# Patient Record
Sex: MALE | Race: BLACK OR AFRICAN AMERICAN
[De-identification: names, ages, dates, MRNs, and addresses within clinical notes are randomized per-mention and may not be internally consistent; named-entity substitution may affect disease eponyms.]

---

## 2019-07-30 ENCOUNTER — HOSPITAL ENCOUNTER (INPATIENT)
Dept: HOSPITAL 74 - YASAS | Age: 54
LOS: 4 days | Discharge: OTHER INSTITUTION | DRG: 773 | End: 2019-08-03
Attending: SURGERY | Admitting: SURGERY
Payer: COMMERCIAL

## 2019-07-30 VITALS — BODY MASS INDEX: 22.8 KG/M2

## 2019-07-30 DIAGNOSIS — F11.23: Primary | ICD-10-CM

## 2019-07-30 DIAGNOSIS — F12.20: ICD-10-CM

## 2019-07-30 DIAGNOSIS — F10.230: ICD-10-CM

## 2019-07-30 DIAGNOSIS — R74.8: ICD-10-CM

## 2019-07-30 DIAGNOSIS — J45.20: ICD-10-CM

## 2019-07-30 DIAGNOSIS — D72.819: ICD-10-CM

## 2019-07-30 DIAGNOSIS — F14.20: ICD-10-CM

## 2019-07-30 DIAGNOSIS — Z21: ICD-10-CM

## 2019-07-30 DIAGNOSIS — F17.210: ICD-10-CM

## 2019-07-30 PROCEDURE — HZ2ZZZZ DETOXIFICATION SERVICES FOR SUBSTANCE ABUSE TREATMENT: ICD-10-PCS | Performed by: ALLERGY & IMMUNOLOGY

## 2019-07-30 RX ADMIN — Medication SCH MG: at 22:12

## 2019-07-30 NOTE — HP
COWS





- Scale


Resting Pulse: 1= MO 


Sweatin=Flushed/Facial Moisture


Restless Observation: 0= Sits Still


Pupil Size: 1= Pupils >than Normal


Bone or Joint Aches: 4=Acute Joint/Muscle Pain


Runny Nose/ Eye Tearin= Runny Nose/Eyes


GI Upset > 30mins: 1= Stomach Cramp


Tremor Observation: 2= Slight Tremor Visible


Yawning Observation: 0= None


Anxiety or Irritability: 2=Irritable/Anxious


Goose Flesh Skin: 0=Smooth Skin


COWS Score: 15





CIWA Score


Nausea/Vomitin


Muscle Tremors: 3


Anxiety: 3


Agitation: 3


Paroxysmal Sweats: 3


Orientation: 0-Oriented


Tacttile Disturbances: 0-None


Auditory Disturbances: 0-None


Visual Disturbances: 0-None


Headache: 0-None Present


CIWA-Ar Total Score: 14





- Admission Criteria


[facility] Guidelines: Admission for Medically Managed Detox: 


Requires at least one of the followin. CIWA greater than 12


2. Seizures within the past 24 hours


3. Delirium tremens within the past 24 hours


4. Hallucinations within the past 24 hours


5. Acute intervention needed for co  occurring medical disorder


6. Acute intervention needed for co  occurring psychiatric disorder


7. Severe withdrawal that cannot be handled at a lower level of care (continued


    vomiting, continued diarrhea, abnormal vital signs) requiring intravenous


    medication and/or fluids


8. Pregnancy








Admission ROS [facility]


Chief Complaint: 





Alcohol and heroin withdrawal symptoms


Allergies/Adverse Reactions: 


 Allergies











Allergy/AdvReac Type Severity Reaction Status Date / Time


 


No Known Allergies Allergy   Verified 19 17:47











History of Present Illness: 





54 years old male with a long history of alcohol dependence  and reportedly 

first time of heroin use is seeking admission to detox. Patient has been in 

previous detox, last at [facility] and reports 3 years of 

sobriety. He reports medical history of asthma and HIV+. He denies suicide 

attempt/ suicidal ideation at this time





- Ebola screening


Have you traveled outside of the country in the last 21 days: No


Have you had contact with anyone from an Ebola affected area: No





- Review of Systems


Constitutional: Chills, Loss of Appetite, Malaise, Changes in sleep


EENT: reports: Sinus Pressure


Respiratory: reports: No Symptoms reported


Cardiac: reports: No Symptoms Reported


GI: reports: Nausea, Poor Appetite, Poor Fluid Intake, Abdominal cramping


: reports: No Symptoms Reported


Musculoskeletal: reports: No Symptoms Reported


Integumentary: reports: No Symptoms Reported


Neuro: reports: Tremors


Endocrine: reports: No Symptoms Reported


Hematology: reports: No Symptoms Reported


Psychiatric: reports: Agitated, Anxious


Other Systems: Reviewed and Negative





Patient History





- Patient Medical History


Hx Anemia: No


Hx Asthma: Yes (Albuterol)


Hx Chronic Obstructive Pulmonary Disease (COPD): No


Hx Cancer: No


Hx Cardiac Disorders: No


Hx Congestive Heart Failure: No


Hx Hypertension: No


Hx Hypercholesterolemia: No


Hx Pacemaker: No


HX Cerebrovascular Accident: No


Hx Seizures: No


Hx Dementia: No


Hx Diabetes: No


Hx Gastrointestinal Disorders: No


Hx Liver Disease: No


Hx Genitourinary Disorders: No


Hx Sexually Transmitted Disorders: No


Hx Renal Disease (ESRD): No


Hx Thyroid Disease: No


Hx Human Immunodeficiency Virus (HIV): Yes (Efavirenz)


Hx Hepatitis C: No


Hx Depression: No


Hx Suicide Attempt: No (Denies suicidal ideation at this time)


Hx Bipolar Disorder: No


Hx Schizophrenia: No





- Patient Surgical History


Past Surgical History: Yes


Other Surgical History: Removal of cyst from anus 





- PPD History


Previous Implant?: Yes


Documented Results: Negative w/o proof


Implanted On Prior [facility] Admission?: No


PPD to be Administered?: Yes





- Reproductive History


Patient is a Female of Child Bearing Age (11 -55 yrs old): No (male)





- Smoking Cessation


Smoking history: Current every day smoker


Have you smoked in the past 12 months: Yes


Aproximately how many cigarettes per day: 20


Hx Chewing Tobacco Use: No


Initiated information on smoking cessation: Yes


'Breaking Loose' booklet given: 19





- Substance & Tx. History


Hx Alcohol Use: Yes


Hx Substance Use: Yes


Substance Use Type: Alcohol, Cocaine, Heroin, Marijuana, Opiates


Hx Substance Use Treatment: Yes ([facility])





- Substances abused


  ** Alcohol


Substance route: Oral


Frequency: Daily


Amount used: 16 oz of beer 2 packs and 3 pints of vodka


Age of first use: 16


Date of last use: 19





  ** Heroin


Substance route: Inhalation


Frequency: 1-3 times last 30 days


Amount used: 10 bags


Age of first use: 16


Date of last use: 19





  ** Crack


Substance route: Smoking


Frequency: 1-3 times last 30 days


Amount used: 5 grams of crack


Age of first use: 16


Date of last use: 19





  ** Marijuana/Hashish


Substance route: Smoking


Frequency: Daily


Amount used: 1 ounce


Age of first use: 16


Date of last use: 19





Family Disease History





- Family Disease History


Family History: Denies





Admission Physical Exam [facility]





- Vital Signs


Vital Signs: 


 Vital Signs - 24 hr











  19





  17:43


 


Temperature 98.6 F


 


Pulse Rate 84


 


Respiratory 16





Rate 


 


Blood Pressure 106/71














- Physical


General Appearance: Yes: Moderate Distress, Thin, Tremorous, Irritable, Anxious


HEENTM: Yes: Within Normal Limits


Respiratory: Yes: Lungs Clear, Normal Breath Sounds, No Respiratory Distress


Neck: Yes: Supple


Breast: Yes: Breast Exam Deferred


Cardiology: Yes: Within Normal Limits


Abdominal: Yes: Normal Bowel Sounds, Soft


Genitourinary: Yes: Within Normal Limits


Back: Yes: Normal Inspection


Musculoskeletal: Yes: Back pain


Extremities: Yes: Tremors


Neurological: Yes: Alert, Normal Mood/Affect


Integumentary: Yes: Warm


Lymphatic: Yes: Within Normal Limits





- Diagnostic


(1) Opioid dependence with withdrawal


Current Visit: Yes   Status: Acute   





(2) Alcohol dependence with uncomplicated withdrawal


Current Visit: Yes   Status: Acute   





(3) Nicotine dependence


Current Visit: Yes   Status: Acute   


Qualifiers: 


   Nicotine product type: cigarettes   Substance use status: uncomplicated   

Qualified Code(s): F17.210 - Nicotine dependence, cigarettes, uncomplicated   





(4) Asthma


Current Visit: Yes   Status: Acute   


Qualifiers: 


   Asthma severity: mild   Asthma persistence: intermittent 





(5) Cocaine dependence


Current Visit: Yes   Status: Acute   


Qualifiers: 


   Complication of substance-induced condition: uncomplicated 





(6) Marijuana dependence


Current Visit: Yes   Status: Acute   





Cleared for Admission [facility]





- Detox or Rehab


[facility] Level of Care: Medically Managed


Detox Regimen/Protocol: Methadone/Librium





Breathalyzer





- Breathalyzer


Breathalyzer: 0.082





Urine Drug Screen





- Test Device


Lot number: [id number]


Expiration date: 21





- Control


Is test valid?: Yes





- Results


Drug screen NEGATIVE: No


Urine drug screen results: THC-Marijuana, FEN-Fentanyl





Inpatient Rehab Admission





- Rehab Decision to Admit


Inpatient rehab admission?: No

## 2019-07-31 LAB
ALBUMIN SERPL-MCNC: 3 G/DL (ref 3.4–5)
ALP SERPL-CCNC: 132 U/L (ref 45–117)
ALT SERPL-CCNC: 32 U/L (ref 13–61)
ANION GAP SERPL CALC-SCNC: 3 MMOL/L (ref 8–16)
AST SERPL-CCNC: 37 U/L (ref 15–37)
BILIRUB SERPL-MCNC: 0.3 MG/DL (ref 0.2–1)
BUN SERPL-MCNC: 8.4 MG/DL (ref 7–18)
CALCIUM SERPL-MCNC: 8.6 MG/DL (ref 8.5–10.1)
CHLORIDE SERPL-SCNC: 108 MMOL/L (ref 98–107)
CO2 SERPL-SCNC: 28 MMOL/L (ref 21–32)
CREAT SERPL-MCNC: 0.8 MG/DL (ref 0.55–1.3)
DEPRECATED RDW RBC AUTO: 13.8 % (ref 11.9–15.9)
GLUCOSE SERPL-MCNC: 81 MG/DL (ref 74–106)
HCT VFR BLD CALC: 41.2 % (ref 35.4–49)
HGB BLD-MCNC: 13.6 GM/DL (ref 11.7–16.9)
MCH RBC QN AUTO: 29 PG (ref 25.7–33.7)
MCHC RBC AUTO-ENTMCNC: 33 G/DL (ref 32–35.9)
MCV RBC: 87.6 FL (ref 80–96)
PLATELET # BLD AUTO: 177 K/MM3 (ref 134–434)
PMV BLD: 10.6 FL (ref 7.5–11.1)
POTASSIUM SERPLBLD-SCNC: 4.2 MMOL/L (ref 3.5–5.1)
PROT SERPL-MCNC: 6.9 G/DL (ref 6.4–8.2)
RBC # BLD AUTO: 4.7 M/MM3 (ref 4–5.6)
SODIUM SERPL-SCNC: 139 MMOL/L (ref 136–145)
WBC # BLD AUTO: 2.9 K/MM3 (ref 4–10)

## 2019-07-31 RX ADMIN — NICOTINE SCH MG: 14 PATCH, EXTENDED RELEASE TRANSDERMAL at 10:41

## 2019-07-31 RX ADMIN — Medication SCH TAB: at 10:41

## 2019-07-31 RX ADMIN — Medication PRN MG: at 21:43

## 2019-07-31 RX ADMIN — Medication SCH MG: at 21:44

## 2019-07-31 NOTE — PN
[facility] CIWA





- CIWA Score


Nausea/Vomitin-Mild Nausea/No Vomiting


Muscle Tremors: 4-Moderate,w/Arms Extend


Anxiety: 3


Agitation: 2


Paroxysmal Sweats: 1-Minimal Palms Moist


Orientation: 0-Oriented


Tacttile Disturbances: 1-Very Mild Itch/Numbness


Auditory Disturbances: 0-None


Visual Disturbances: 0-None


Headache: 1-Very Mild


CIWA-Ar Total Score: 13





[facility] COWS





- Scale


Resting Pulse: 1= ND 


Sweatin= Chills/Flushing


Restless Observation: 0= Sits Still


Pupil Size: 0= Normal to Room Light


Bone or Joint Aches: 1= Mild Discomfort


Runny Nose/ Eye Tearin= Nasal Congestion


GI Upset > 30mins: 2= Nausea/Diarrhea


Tremor Observation of Outstretched Hands: 2= Slight Tremor Visible


Yawning Observation: 1= 1-2x During Session


Anxiety or Irritability: 1=Feels Anxious/Irritable


Goose Flesh Skin: 3=Piloerection


COWS Score: 13





[facility] Progress Note (SOAP)


Subjective: 





54 years old male admitted on 19 for acute alcohol and opiate withdrawal 

sx


long history of hiv 


had not bring in own hiv medications upon admission


patient reported that he is taking two ART





unable to remember the name of "other" ART


writer called [id number]


last filled genvoya was 2019 for 30 days supply authorization pending 








patient is with the pharmacy x 16 months


strong recommend the patient to bring in current bottles of ART for continuity 

of treatment


Objective: 





19 10:21


 Vital Signs











Temperature  96.1 F L  19 09:24


 


Pulse Rate  87   19 09:24


 


Respiratory Rate  16   19 09:24


 


Blood Pressure  126/72   19 09:24


 


O2 Sat by Pulse Oximetry (%)      











19 10:21


lab pending


Assessment: 





19 10:21


alcohol and opiate withdrawal sx


Plan: 





continue [id number] and opiate detox

## 2019-07-31 NOTE — EKG
Test Reason : 

Blood Pressure : ***/*** mmHG

Vent. Rate : 070 BPM     Atrial Rate : 070 BPM

   P-R Int : 160 ms          QRS Dur : 092 ms

    QT Int : 442 ms       P-R-T Axes : 080 047 248 degrees

   QTc Int : 477 ms

 

NORMAL SINUS RHYTHM WITH SINUS ARRHYTHMIA

BIATRIAL ENLARGEMENT

LEFT VENTRICULAR HYPERTROPHY WITH REPOLARIZATION ABNORMALITY

ABNORMAL ECG

NO PREVIOUS ECGS AVAILABLE

Confirmed by NETTA DE LA CRUZ, ALY ([id number]) on 7/31/2019 8:08:01 AM

 

Referred By: MORENA MAYNARD           Confirmed By:ALY CHADWICK MD

## 2019-08-01 RX ADMIN — METHOCARBAMOL PRN MG: 500 TABLET ORAL at 14:24

## 2019-08-01 RX ADMIN — Medication SCH TAB: at 10:17

## 2019-08-01 RX ADMIN — NICOTINE SCH MG: 14 PATCH, EXTENDED RELEASE TRANSDERMAL at 10:17

## 2019-08-01 RX ADMIN — Medication SCH MG: at 22:07

## 2019-08-01 RX ADMIN — Medication PRN MG: at 22:07

## 2019-08-01 RX ADMIN — ASPIRIN 81 MG SCH MG: 81 TABLET ORAL at 10:17

## 2019-08-01 NOTE — PN
S CIWA





- CIWA Score


Nausea/Vomitin-Mild Nausea/No Vomiting


Muscle Tremors: 3


Anxiety: 2


Agitation: 2


Paroxysmal Sweats: 1-Minimal Palms Moist


Orientation: 0-Oriented


Tacttile Disturbances: 0-None


Auditory Disturbances: 0-None


Visual Disturbances: 0-None


Headache: 0-None Present


CIWA-Ar Total Score: 9





[facility] COWS





- Scale


Resting Pulse: 0= SD 80 or Below


Sweatin= Chills/Flushing


Restless Observation: 0= Sits Still


Pupil Size: 0= Normal to Room Light


Bone or Joint Aches: 1= Mild Discomfort


Runny Nose/ Eye Tearin= Nasal Congestion


GI Upset > 30mins: 1= Stomach Cramp


Tremor Observation of Outstretched Hands: 2= Slight Tremor Visible


Yawning Observation: 2= >3x During Session


Anxiety or Irritability: 1=Feels Anxious/Irritable


Goose Flesh Skin: 0=Smooth Skin


COWS Score: 9





BHS Progress Note (SOAP)


Subjective: 





ekg indicates arrythemia atrials enlargement with left ventricular enlargement


begin aspirin 





ekg was performed 19 9 pm 


patient is asymptomatic 





discuss risks of alcohol and opiate misuse related to cardiac insults





Objective: 





19 09:32


 Vital Signs











Temperature  97.0 F L  19 09:29


 


Pulse Rate  58 L  19 09:29


 


Respiratory Rate  18   19 09:29


 


Blood Pressure  105/86   19 09:29


 


O2 Sat by Pulse Oximetry (%)      








 Laboratory Last Values











WBC  2.9 K/mm3 (4.0-10.0)  L  19  07:30    


 


RBC  4.70 M/mm3 (4.00-5.60)   19  07:30    


 


Hgb  13.6 GM/dL (11.7-16.9)   19  07:30    


 


Hct  41.2 % (35.4-49)   19  07:30    


 


MCV  87.6 fl (80-96)   19  07:30    


 


MCH  29.0 pg (25.7-33.7)   19  07:30    


 


MCHC  33.0 g/dl (32.0-35.9)   19  07:30    


 


RDW  13.8 % (11.9-15.9)   19  07:30    


 


Plt Count  177 K/MM3 (134-434)   19  07:30    


 


MPV  10.6 fl (7.5-11.1)   19  07:30    


 


Sodium  139 mmol/L (136-145)   19  07:30    


 


Potassium  4.2 mmol/L (3.5-5.1)   19  07:30    


 


Chloride  108 mmol/L ()  H  19  07:30    


 


Carbon Dioxide  28 mmol/L (21-32)   19  07:30    


 


Anion Gap  3 MMOL/L (8-16)  L  19  07:30    


 


BUN  8.4 mg/dL (7-18)   19  07:30    


 


Creatinine  0.8 mg/dL (0.55-1.3)   19  07:30    


 


Est GFR (CKD-EPI)AfAm  119.04   19  07:30    


 


Est GFR (CKD-EPI)NonAf  102.71   19  07:30    


 


Random Glucose  81 mg/dL ()   19  07:30    


 


Calcium  8.6 mg/dL (8.5-10.1)   19  07:30    


 


Total Bilirubin  0.3 mg/dL (0.2-1)   19  07:30    


 


AST  37 U/L (15-37)   19  07:30    


 


ALT  32 U/L (13-61)   19  07:30    


 


Alkaline Phosphatase  132 U/L ()  H  19  07:30    


 


Total Protein  6.9 g/dl (6.4-8.2)   19  07:30    


 


Albumin  3.0 g/dl (3.4-5.0)  L  19  07:30    


 


RPR Titer  Nonreactive  (NONREACTIVE)   19  07:30    








lab noted


bring in lab result to infectious disease specialist for follow up 


Assessment: 





19 09:32


alcohol and opiate withdrawal sx


Plan: 





continue alcohol and opiate detox

## 2019-08-02 RX ADMIN — ASPIRIN 81 MG SCH MG: 81 TABLET ORAL at 10:04

## 2019-08-02 RX ADMIN — Medication SCH MG: at 22:08

## 2019-08-02 RX ADMIN — Medication PRN MG: at 22:08

## 2019-08-02 RX ADMIN — NICOTINE SCH MG: 14 PATCH, EXTENDED RELEASE TRANSDERMAL at 10:05

## 2019-08-02 RX ADMIN — METHOCARBAMOL PRN MG: 500 TABLET ORAL at 22:08

## 2019-08-02 RX ADMIN — METHOCARBAMOL PRN MG: 500 TABLET ORAL at 10:06

## 2019-08-02 RX ADMIN — Medication SCH TAB: at 10:05

## 2019-08-02 NOTE — PN
S CIWA





- CIWA Score


Nausea/Vomitin-No Nausea/No Vomiting


Muscle Tremors: 2


Anxiety: 2


Agitation: 1-Slight > Activity


Paroxysmal Sweats: 2


Orientation: 0-Oriented


Tacttile Disturbances: 0-None


Auditory Disturbances: 0-None


Visual Disturbances: 1-Very Mild Sensitivity


Headache: 0-None Present


CIWA-Ar Total Score: 8





[facility] COWS





- Scale


Resting Pulse: 0= NV 80 or Below


Sweatin= Chills/Flushing


Restless Observation: 1= Difficult to Sit Still


Pupil Size: 0= Normal to Room Light


Bone or Joint Aches: 1= Mild Discomfort


Runny Nose/ Eye Tearin= None


GI Upset > 30mins: 0= None


Tremor Observation of Outstretched Hands: 2= Slight Tremor Visible


Yawning Observation: 1= 1-2x During Session


Anxiety or Irritability: 2=Irritable/Anxious


Goose Flesh Skin: 0=Smooth Skin


COWS Score: 8





[facility] Progress Note (SOAP)


Subjective: 





Anxious, Tremors, Sweating.


Objective: 


PATIENT A & O X 3, OBSERVED AMBULATING ON UNIT UNASSISTED. IN NO ACUTE DISTRESS.





19 16:30


 Vital Signs











Temperature  97.9 F   19 13:02


 


Pulse Rate  57 L  19 13:02


 


Respiratory Rate  18   19 13:02


 


Blood Pressure  115/73   19 13:02


 


O2 Sat by Pulse Oximetry (%)      








 Laboratory Tests











  19





  07:30 07:30 07:30


 


WBC  2.9 L  


 


RBC  4.70  


 


Hgb  13.6  


 


Hct  41.2  


 


MCV  87.6  


 


MCH  29.0  


 


MCHC  33.0  


 


RDW  13.8  


 


Plt Count  177  


 


MPV  10.6  


 


Sodium   139 


 


Potassium   4.2 


 


Chloride   108 H 


 


Carbon Dioxide   28 


 


Anion Gap   3 L 


 


BUN   8.4 


 


Creatinine   0.8 


 


Est GFR (CKD-EPI)AfAm   119.04 


 


Est GFR (CKD-EPI)NonAf   102.71 


 


Random Glucose   81 


 


Calcium   8.6 


 


Total Bilirubin   0.3 


 


AST   37 


 


ALT   32 


 


Alkaline Phosphatase   132 H 


 


Total Protein   6.9 


 


Albumin   3.0 L 


 


RPR Titer    Nonreactive








LABS NOTED.


RESULTS OF DETOX ADMISSION QFT  /TB TEST PENDING.





19 16:31


Assessment: 





19 16:32


WITHDRAWAL SYMPTOMS.


LEUKOPENIA.


ELEVATED ALKALINE PHOSPHATASE LEVEL.


Plan: 





CONTINUE DETOX.





PATIENT SCHEDULED FOR D/C FROM DETOX UNIT TOMORROW.

## 2019-08-03 ENCOUNTER — HOSPITAL ENCOUNTER (INPATIENT)
Dept: HOSPITAL 74 - YASAS | Age: 54
Discharge: LEFT BEFORE DISCHARGE | DRG: 770 | End: 2019-08-03
Attending: NEUROMUSCULOSKELETAL MEDICINE & OMM | Admitting: NEUROMUSCULOSKELETAL MEDICINE & OMM
Payer: COMMERCIAL

## 2019-08-03 VITALS — TEMPERATURE: 97.3 F | HEART RATE: 67 BPM | DIASTOLIC BLOOD PRESSURE: 77 MMHG | SYSTOLIC BLOOD PRESSURE: 107 MMHG

## 2019-08-03 DIAGNOSIS — F14.20: ICD-10-CM

## 2019-08-03 DIAGNOSIS — F10.20: ICD-10-CM

## 2019-08-03 DIAGNOSIS — J45.20: ICD-10-CM

## 2019-08-03 DIAGNOSIS — F11.20: Primary | ICD-10-CM

## 2019-08-03 DIAGNOSIS — F12.20: ICD-10-CM

## 2019-08-03 DIAGNOSIS — F17.210: ICD-10-CM

## 2019-08-03 DIAGNOSIS — Z21: ICD-10-CM

## 2019-08-03 RX ADMIN — ASPIRIN 81 MG SCH MG: 81 TABLET ORAL at 10:50

## 2019-08-03 RX ADMIN — NICOTINE SCH MG: 14 PATCH, EXTENDED RELEASE TRANSDERMAL at 10:50

## 2019-08-03 RX ADMIN — METHOCARBAMOL PRN MG: 500 TABLET ORAL at 06:18

## 2019-08-03 RX ADMIN — Medication SCH TAB: at 10:50

## 2019-08-03 NOTE — PN
[facility] Progress Note


Note: 





patient did not want to complete treatment,stated he is feeling well,left unit 

in stable condition,advise to call 911 if any problem,axplained the chance of 

relapsing is high,patient signed release ama


left unit in stable condition

## 2019-08-03 NOTE — PN
[facility] Progress Note


Note: 





this note is for dicarge from rehab


date of admission 08/03/19


date of discharge 08/03/19


diagnosis heroin dependence


             cocaine dependence


             cannabis dependence


             hiv


            rectal cancer


patient signed release AMA


           left the unit in stable condition,has all medciations at home,follow 

up with PMD for medical problem

## 2019-08-03 NOTE — HP
[facility] MD Rehab Assess/Revision





- Admission History


Admitted to Rehab from: [facility]


Date of Admission to Rehab: 08/03/2019





- Vital signs


Vital Signs: 





NOTED; STABLE.





- Findings


Detox History & Physical reviewed: Yes


Concur with findings: Yes


Comments/Additional Findings: PATIENT'S MEDICAL / MEDICATION HISTORY REVIEWED 

PRIOR TO DISCHARGE FROM DETOX UNIT. PATIENT WAS DISCHARGED FROM DETOX UNIT TO 

BE TAKEN OVER TO REHAB UNIT IN STABLE MEDICAL CONDITION.





Inpatient Rehab Admission





- Rehab Decision to Admit


Inpatient rehab admission?: Yes





- Initial Determination


Are CD services needed?: Yes


Free of communicable disease: Yes


Not in need of hospitalization: Yes





- Rehab Admission Criteria


Previous failed treatment: Yes


Poor recovery environment: Yes


Comorbidities: Yes


Lacks judgement: No


Patient is meeting Inpatient Rehab admission criteria:: Yes

## 2019-08-03 NOTE — DS
[facility] Detox Discharge Summary


Admission Date: 


07/30/19





Discharge Date: 08/03/19





- History


Present History: Alcohol Dependence, Cannabis Dependence, Cocaine Dependence, 

Opioid Dependence


Additional Comments: 





PATIENT GOING TO [facility] ([facility]) FOR AFTERCARE. PATIENT 

WAS DISCHARGED FROM DETOX UNIT TO BE TAKEN OVER TO REHAB UNIT IN STABLE MEDICAL 

CONDITION.


Pertinent Past History: 





Nicotine Dependence, Leukopenia, Elevated Alkaline Phosphatase Level, H.I.V., 

Asthma.





- Physical Exam Results


Vital Signs: 


 Vital Signs











Temperature  96.7 F L  08/03/19 09:00


 


Pulse Rate  76   08/03/19 09:00


 


Respiratory Rate  18   08/03/19 09:00


 


Blood Pressure  123/89   08/03/19 09:00


 


O2 Sat by Pulse Oximetry (%)      











Pertinent Admission Physical Exam Findings: 





WITHDRAWAL SYMPTOMS.





 Laboratory Tests











  07/31/19 07/31/19 07/31/19





  07:30 07:30 07:30


 


WBC  2.9 L  


 


RBC  4.70  


 


Hgb  13.6  


 


Hct  41.2  


 


MCV  87.6  


 


MCH  29.0  


 


MCHC  33.0  


 


RDW  13.8  


 


Plt Count  177  


 


MPV  10.6  


 


Sodium   139 


 


Potassium   4.2 


 


Chloride   108 H 


 


Carbon Dioxide   28 


 


Anion Gap   3 L 


 


BUN   8.4 


 


Creatinine   0.8 


 


Est GFR (CKD-EPI)AfAm   119.04 


 


Est GFR (CKD-EPI)NonAf   102.71 


 


Random Glucose   81 


 


Calcium   8.6 


 


Total Bilirubin   0.3 


 


AST   37 


 


ALT   32 


 


Alkaline Phosphatase   132 H 


 


Total Protein   6.9 


 


Albumin   3.0 L 


 


RPR Titer   


 


TB (QFT) Incubation    


 


TB Test (QFT) Nil    0.05


 


TB Test (QFT) Mitogen    5.45


 


TB Test (QFT) Antigen    0.04


 


TB Test (QFT)    Negative


 


TB Positive Criteria    














  07/31/19





  07:30


 


WBC 


 


RBC 


 


Hgb 


 


Hct 


 


MCV 


 


MCH 


 


MCHC 


 


RDW 


 


Plt Count 


 


MPV 


 


Sodium 


 


Potassium 


 


Chloride 


 


Carbon Dioxide 


 


Anion Gap 


 


BUN 


 


Creatinine 


 


Est GFR (CKD-EPI)AfAm 


 


Est GFR (CKD-EPI)NonAf 


 


Random Glucose 


 


Calcium 


 


Total Bilirubin 


 


AST 


 


ALT 


 


Alkaline Phosphatase 


 


Total Protein 


 


Albumin 


 


RPR Titer  Nonreactive


 


TB (QFT) Incubation 


 


TB Test (QFT) Nil 


 


TB Test (QFT) Mitogen 


 


TB Test (QFT) Antigen 


 


TB Test (QFT) 


 


TB Positive Criteria 








LABS NOTED.





- Treatment


Hospital Course: Detox Protocol Followed, Detoxed Safely, Responded well, 

Discharged Condition Good, Rehab Referral Accepted


Patient has Accepted [facility] Referral to: [facility] ([place], [place]).





- Medication


Discharge Medications: 


Ambulatory Orders





Efavirenz [Sustiva -] 200 mg PO DAILY 07/30/19 











- Diagnosis


(1) Alcohol dependence with uncomplicated withdrawal


Current Visit: Yes   Status: Acute   





(2) Asthma


Current Visit: Yes   Status: Acute   


Qualifiers: 


   Asthma severity: mild   Asthma persistence: intermittent   Asthma 

complication type: uncomplicated   Qualified Code(s): J45.20 - Mild 

intermittent asthma, uncomplicated   





(3) Cocaine dependence


Current Visit: Yes   Status: Acute   


Qualifiers: 


   Substance use status: in withdrawal   Qualified Code(s): F14.23 - Cocaine 

dependence with withdrawal   





(4) Elevated alkaline phosphatase level


Current Visit: Yes   Status: Acute   





(5) Leukopenia


Current Visit: Yes   Status: Acute   


Qualifiers: 


   Leukopenia type: unspecified   Qualified Code(s): D72.819 - Decreased white 

blood cell count, unspecified   





(6) Marijuana dependence


Current Visit: Yes   Status: Acute   





(7) Nicotine dependence


Current Visit: Yes   Status: Acute   


Qualifiers: 


   Nicotine product type: cigarettes   Substance use status: uncomplicated   

Qualified Code(s): F17.210 - Nicotine dependence, cigarettes, uncomplicated   





(8) Opioid dependence with withdrawal


Current Visit: Yes   Status: Acute   





- AMA


Did Patient Leave Against Medical Advice: No

## 2019-08-10 ENCOUNTER — HOSPITAL ENCOUNTER (INPATIENT)
Dept: HOSPITAL 74 - YASAS | Age: 54
LOS: 3 days | Discharge: LEFT BEFORE DISCHARGE | DRG: 770 | End: 2019-08-13
Attending: SURGERY | Admitting: SURGERY
Payer: COMMERCIAL

## 2019-08-10 VITALS — BODY MASS INDEX: 23.4 KG/M2

## 2019-08-10 DIAGNOSIS — J45.20: ICD-10-CM

## 2019-08-10 DIAGNOSIS — R77.0: ICD-10-CM

## 2019-08-10 DIAGNOSIS — Z21: ICD-10-CM

## 2019-08-10 DIAGNOSIS — F12.20: ICD-10-CM

## 2019-08-10 DIAGNOSIS — F10.230: Primary | ICD-10-CM

## 2019-08-10 DIAGNOSIS — D72.819: ICD-10-CM

## 2019-08-10 DIAGNOSIS — R79.89: ICD-10-CM

## 2019-08-10 DIAGNOSIS — R74.0: ICD-10-CM

## 2019-08-10 DIAGNOSIS — F17.210: ICD-10-CM

## 2019-08-10 DIAGNOSIS — Z85.048: ICD-10-CM

## 2019-08-10 DIAGNOSIS — F10.220: ICD-10-CM

## 2019-08-10 DIAGNOSIS — F14.20: ICD-10-CM

## 2019-08-10 PROCEDURE — HZ2ZZZZ DETOXIFICATION SERVICES FOR SUBSTANCE ABUSE TREATMENT: ICD-10-PCS | Performed by: ALLERGY & IMMUNOLOGY

## 2019-08-10 RX ADMIN — Medication PRN MG: at 23:35

## 2019-08-10 RX ADMIN — Medication SCH MG: at 23:35

## 2019-08-10 NOTE — PN
[facility] Progress Note


Note: 





patient stated he did not have Genvoya with him,agreed to be on Lamivudine-

Zidovudine tab po bid,reyataz and norvir,


will have appointment to see his pmd after discharge from detox and rehab

## 2019-08-10 NOTE — HP
CIWA Score


Nausea/Vomitin


Muscle Tremors: 2


Anxiety: 3


Agitation: 3


Paroxysmal Sweats: 1-Minimal Palms Moist


Orientation: 0-Oriented


Tacttile Disturbances: 1-Very Mild Itch/Numbness


Auditory Disturbances: 0-None


Visual Disturbances: 0-None


Headache: 2-Mild


CIWA-Ar Total Score: 14





- Admission Criteria


[facility] Guidelines: Admission for Medically Managed Detox: 


Requires at least one of the followin. CIWA greater than 12


2. Seizures within the past 24 hours


3. Delirium tremens within the past 24 hours


4. Hallucinations within the past 24 hours


5. Acute intervention needed for co  occurring medical disorder


6. Acute intervention needed for co  occurring psychiatric disorder


7. Severe withdrawal that cannot be handled at a lower level of care (continued


    vomiting, continued diarrhea, abnormal vital signs) requiring intravenous


    medication and/or fluids


8. Pregnancy








Admission ROS S





- HPI


Chief Complaint: 





i need help to stop drinking alcohol,cocaine,marijuana,k2


Allergies/Adverse Reactions: 


 Allergies











Allergy/AdvReac Type Severity Reaction Status Date / Time


 


No Known Allergies Allergy   Verified 08/10/19 12:31











History of Present Illness: 





this 54 years old male with alcohol,cocaine,marijuana dependence,k2 seeking help

,


patient has completed detox 19 to 19 [facility],went to Rehab on 19 

but left AMA to get medication


denied seizure


denied black out


asthma on albuterol inhaler


had anal cancer 19 and 19 at [facility]


follow up with PMD and Surgeon


weight loss


nicotine dependence 40 cigarette/day,would like to have nicotine patch


no significant period of sobriety


hiv since  on meds and follow up with own Medical Provider


plan for rehab after detox





Exam Limitations: No Limitations





- Ebola screening


Have you traveled outside of the country in the last 21 days: No (N)


Have you had contact with anyone from an Ebola affected area: No


Do you have a fever: No





- Review of Systems


Constitutional: Night Sweats, Changes in sleep, Weakness, Unintentional Wgt. 

Loss


EENT: reports: Nose Congestion


Respiratory: reports: Other (asthma)


Cardiac: reports: No Symptoms Reported


GI: reports: Nausea, Poor Appetite, Abdominal cramping


: reports: No Symptoms Reported


Musculoskeletal: reports: Back Pain, Joint Pain, Muscle Pain


Integumentary: reports: Dryness


Neuro: reports: Headache, Tremors


Endocrine: reports: No Symptoms Reported


Hematology: reports: Other


Psychiatric: reports: No Sypmtoms Reported, Judgement Intact, Mood/Affect 

Appropiate, Anxious


Other Systems: Reviewed and Negative





Patient History





- Patient Medical History


Hx Anemia: No


Hx Asthma: Yes (Albuterol)


Hx Chronic Obstructive Pulmonary Disease (COPD): No


Hx Cancer: No


Hx Cardiac Disorders: No


Hx Congestive Heart Failure: No


Hx Hypertension: No


Hx Hypercholesterolemia: No


Hx Pacemaker: No


HX Cerebrovascular Accident: No


Hx Seizures: No


Hx Dementia: No


Hx Diabetes: No


Hx Gastrointestinal Disorders: No


Hx Liver Disease: No


Hx Genitourinary Disorders: No


Hx Sexually Transmitted Disorders: No


Hx Renal Disease (ESRD): No


Hx Thyroid Disease: No


Hx Human Immunodeficiency Virus (HIV): Yes (since  on med)


Hx Hepatitis C: No


Hx Depression: No


Hx Suicide Attempt: No (Denies suicidal ideation at this time)


Hx Bipolar Disorder: No


Hx Schizophrenia: No


Other Medical History: no suicidal,no homicidal





- Patient Surgical History


Past Surgical History: Yes


Hx Neurologic Surgery: No


Hx Cataract Extraction: No


Hx Cardiac Surgery: No


Hx Lung Surgery: No


Hx Breast Surgery: No


Hx Breast Biopsy: No


Hx Abdominal Surgery: No


Hx Appendectomy: No


Hx Cholecystectomy: No


Hx Genitourinary Surgery: No


Hx  Section: No


Hx Orthopedic Surgery: No


Other Surgical History: removal of anal cancer 0n 19 and [facility] at 

[facility]


Anesthesia Reaction: No





- PPD History


Previous Implant?: Yes


Implanted On Prior [facility] Admission?: No


Date: 08/10/19


PPD to be Administered?: No





- Smoking Cessation


Smoking history: Current every day smoker


Have you smoked in the past 12 months: Yes


Aproximately how many cigarettes per day: 40


Hx Chewing Tobacco Use: No


Initiated information on smoking cessation: Yes


'Breaking Loose' booklet given: 08/10/19





- Substance & Tx. History


Hx Alcohol Use: Yes


Hx Substance Use: Yes


Substance Use Type: Alcohol, Cocaine, Heroin, Marijuana





- Substances abused


  ** Alcohol


Substance route: Oral


Frequency: Daily


Amount used: 32 pack sixteen ounce beer, 2-3 (1/5)


Age of first use: 16


Date of last use: 08/10/19





  ** Heroin


Substance route: Inhalation


Frequency: 1-3 times last 30 days


Amount used: 10 bags


Age of first use: 16


Date of last use: 19





  ** Crack


Substance route: Smoking


Frequency: 1-3 times last 30 days


Amount used: 8 ball/day


Age of first use: 16


Date of last use: 21





  ** Marijuana/Hashish


Substance route: Smoking


Frequency: Daily


Amount used: 1 ounce


Age of first use: 16


Date of last use: 08/10/19





  ** K2/Spice


Substance route: Smoking


Frequency: Daily


Amount used: $50/week


Age of first use: 50


Date of last use: 08/10/19





Family Disease History





- Family Disease History


Family History: Denies





Admission Physical Exam [facility]





- Vital Signs


Vital Signs: 


 Vital Signs - 24 hr











  08/10/19





  12:38


 


Temperature 97.3 F L


 


Pulse Rate 80


 


Respiratory 18





Rate 


 


Blood Pressure 112/69














- Physical


General Appearance: Yes: Moderate Distress, Tremorous, Irritable, Sweating, 

Anxious


HEENTM: Yes: Normal ENT Inspection, FABIOLA, Pharynx Normal


Respiratory: Yes: Lungs Clear, Normal Breath Sounds, No Respiratory Distress


Neck: Yes: Within Normal Limits, Supple, Trachea in good position


Breast: Yes: Within Normal Limits


Cardiology: Yes: Within Normal Limits, Regular Rhythm, Regular Rate, S1, S2


Abdominal: Yes: Within Normal Limits, Normal Bowel Sounds, Non Tender, Flat


Genitourinary: Yes: Within Normal Limits


Back: Yes: Muscle Spasm


Musculoskeletal: Yes: Back pain, Muscle Pain


Extremities: Yes: Tremors


Neurological: Yes: CNs II-XII NML intact, Fully Oriented, Alert, Motor Strength 

5/5


Integumentary: Yes: Dry


Lymphatic: Yes: Within Normal Limits





- Diagnostic


(1) Alcohol dependence with uncomplicated withdrawal


Current Visit: No   Status: Acute   





(2) Asthma


Current Visit: No   Status: Acute   


Qualifiers: 


   Asthma severity: mild   Asthma persistence: intermittent   Asthma 

complication type: uncomplicated   Qualified Code(s): J45.20 - Mild 

intermittent asthma, uncomplicated   





(3) Cocaine dependence


Current Visit: No   Status: Acute   


Qualifiers: 


   Substance use status: in withdrawal   Qualified Code(s): F14.23 - Cocaine 

dependence with withdrawal   





(4) Marijuana dependence


Current Visit: No   Status: Acute   





(5) Nicotine dependence


Current Visit: No   Status: Acute   


Qualifiers: 


   Nicotine product type: cigarettes   Substance use status: uncomplicated   

Qualified Code(s): F17.210 - Nicotine dependence, cigarettes, uncomplicated   





(6) History of anal cancer


Current Visit: Yes   Status: Acute   





(7) Alcohol dependence with uncomplicated intoxication


Current Visit: Yes   Status: Acute   





Cleared for Admission S





- Detox or Rehab


[facility] Level of Care: Medically Managed


Detox Regimen/Protocol: Librium





Breathalyzer





- Breathalyzer


Breathalyzer: 0.022





Urine Drug Screen





- Test Device


Lot number: [id number]


Expiration date: 21





- Control


Is test valid?: Yes





- Results


Drug screen NEGATIVE: No


Urine drug screen results: THC-Marijuana, SHONNA-Cocaine, BZO-Benzodiazepines





Inpatient Rehab Admission





- Rehab Decision to Admit


Inpatient rehab admission?: No

## 2019-08-11 LAB
ALBUMIN SERPL-MCNC: 2.6 G/DL (ref 3.4–5)
ALP SERPL-CCNC: 130 U/L (ref 45–117)
ALT SERPL-CCNC: 30 U/L (ref 13–61)
ANION GAP SERPL CALC-SCNC: 3 MMOL/L (ref 8–16)
AST SERPL-CCNC: 35 U/L (ref 15–37)
BILIRUB SERPL-MCNC: 0.3 MG/DL (ref 0.2–1)
BUN SERPL-MCNC: 8.1 MG/DL (ref 7–18)
CALCIUM SERPL-MCNC: 8.3 MG/DL (ref 8.5–10.1)
CHLORIDE SERPL-SCNC: 110 MMOL/L (ref 98–107)
CO2 SERPL-SCNC: 27 MMOL/L (ref 21–32)
CREAT SERPL-MCNC: 0.8 MG/DL (ref 0.55–1.3)
DEPRECATED RDW RBC AUTO: 14.2 % (ref 11.9–15.9)
GLUCOSE SERPL-MCNC: 100 MG/DL (ref 74–106)
HCT VFR BLD CALC: 40.3 % (ref 35.4–49)
HGB BLD-MCNC: 13.4 GM/DL (ref 11.7–16.9)
MCH RBC QN AUTO: 29.3 PG (ref 25.7–33.7)
MCHC RBC AUTO-ENTMCNC: 33.2 G/DL (ref 32–35.9)
MCV RBC: 88.3 FL (ref 80–96)
PLATELET # BLD AUTO: 151 K/MM3 (ref 134–434)
PMV BLD: 10.6 FL (ref 7.5–11.1)
POTASSIUM SERPLBLD-SCNC: 3.7 MMOL/L (ref 3.5–5.1)
PROT SERPL-MCNC: 6.3 G/DL (ref 6.4–8.2)
RBC # BLD AUTO: 4.56 M/MM3 (ref 4–5.6)
SODIUM SERPL-SCNC: 141 MMOL/L (ref 136–145)
WBC # BLD AUTO: 3.5 K/MM3 (ref 4–10)

## 2019-08-11 RX ADMIN — EMTRICITABINE AND TENOFOVIR DISOPROXIL FUMARATE SCH: 200; 300 TABLET, FILM COATED ORAL at 11:00

## 2019-08-11 RX ADMIN — Medication SCH EACH: at 23:27

## 2019-08-11 RX ADMIN — Medication SCH TAB: at 10:56

## 2019-08-11 RX ADMIN — Medication SCH: at 22:33

## 2019-08-11 RX ADMIN — RITONAVIR SCH MG: 100 TABLET, FILM COATED ORAL at 10:57

## 2019-08-11 RX ADMIN — Medication PRN MG: at 22:34

## 2019-08-11 RX ADMIN — ATAZANAVIR SCH MG: 150 CAPSULE, GELATIN COATED ORAL at 07:00

## 2019-08-11 RX ADMIN — IBUPROFEN PRN MG: 400 TABLET, FILM COATED ORAL at 18:15

## 2019-08-11 RX ADMIN — Medication SCH: at 23:00

## 2019-08-11 RX ADMIN — NICOTINE SCH: 21 PATCH TRANSDERMAL at 10:57

## 2019-08-11 RX ADMIN — Medication SCH EACH: at 22:33

## 2019-08-11 NOTE — PN
S CIWA





- CIWA Score


Nausea/Vomitin-Mild Nausea/No Vomiting


Muscle Tremors: 4-Moderate,w/Arms Extend


Anxiety: 3


Agitation: 3


Paroxysmal Sweats: 3


Orientation: 0-Oriented


Tacttile Disturbances: 0-None


Auditory Disturbances: 0-None


Visual Disturbances: 0-None


Headache: 0-None Present


CIWA-Ar Total Score: 14





[facility] Progress Note (SOAP)


Subjective: 





Feels ok, medication working well


Objective: 





19 15:16


 Last Vital Signs











Temp Pulse Resp BP Pulse Ox


 


 97.3 F L  83   18   113/77    


 


 19 13:36  19 13:36  19 13:36  19 13:36   








 Laboratory Tests











  19





  08:00 08:00


 


WBC  3.5 L 


 


RBC  4.56 


 


Hgb  13.4 


 


Hct  40.3 


 


MCV  88.3 


 


MCH  29.3 


 


MCHC  33.2 


 


RDW  14.2 


 


Plt Count  151 


 


MPV  10.6 


 


Sodium   141


 


Potassium   3.7


 


Chloride   110 H


 


Carbon Dioxide   27


 


Anion Gap   3 L


 


BUN   8.1


 


Creatinine   0.8


 


Est GFR (CKD-EPI)AfAm   119.04


 


Est GFR (CKD-EPI)NonAf   102.71


 


Random Glucose   100


 


Calcium   8.3 L


 


Total Bilirubin   0.3


 


AST   35


 


ALT   30


 


Alkaline Phosphatase   130 H


 


Total Protein   6.3 L


 


Albumin   2.6 L








Labs reviewed: low total protein and albumin


Assessment: 





19 15:17


Withdrawal sxs





Noted with low total protein and hypoalbuminemia


Plan: 





Continue detox


Encouraged PO water intake





Low total protein and hypoalbuminemia: encouraged diet, continue ensure 

supplement

## 2019-08-12 RX ADMIN — Medication PRN MG: at 22:07

## 2019-08-12 RX ADMIN — Medication SCH TAB: at 10:33

## 2019-08-12 RX ADMIN — IBUPROFEN PRN MG: 400 TABLET, FILM COATED ORAL at 18:59

## 2019-08-12 RX ADMIN — METHOCARBAMOL PRN MG: 500 TABLET ORAL at 18:59

## 2019-08-12 RX ADMIN — NICOTINE SCH: 21 PATCH TRANSDERMAL at 10:33

## 2019-08-12 RX ADMIN — IBUPROFEN PRN MG: 400 TABLET, FILM COATED ORAL at 12:38

## 2019-08-12 RX ADMIN — EMTRICITABINE AND TENOFOVIR DISOPROXIL FUMARATE SCH: 200; 300 TABLET, FILM COATED ORAL at 12:27

## 2019-08-12 RX ADMIN — RITONAVIR SCH MG: 100 TABLET, FILM COATED ORAL at 11:40

## 2019-08-12 RX ADMIN — Medication SCH EACH: at 10:32

## 2019-08-12 RX ADMIN — LAMIVUDINE AND ZIDOVUDINE SCH TABLET: 150; 300 TABLET, FILM COATED ORAL at 22:07

## 2019-08-12 RX ADMIN — ATAZANAVIR SCH MG: 150 CAPSULE, GELATIN COATED ORAL at 06:59

## 2019-08-12 RX ADMIN — METHOCARBAMOL PRN MG: 500 TABLET ORAL at 12:38

## 2019-08-12 RX ADMIN — Medication SCH MG: at 22:08

## 2019-08-12 NOTE — PN
S CIWA





- CIWA Score


Nausea/Vomitin-No Nausea/No Vomiting


Muscle Tremors: 3


Anxiety: 4-Mod. Anxious/Guarded


Agitation: 3


Paroxysmal Sweats: No Perspiration


Orientation: 0-Oriented


Tacttile Disturbances: 0-None


Auditory Disturbances: 0-None


Visual Disturbances: 1-Very Mild Sensitivity


Headache: 0-None Present


CIWA-Ar Total Score: 11





[facility] Progress Note (SOAP)


Subjective: 





Anxious, Tremors.


Objective: 


PATIENT A & O X 3, OBSERVED AMBULATING ON UNIT UNASSISTED. IN NO ACUTE DISTRESS.





19 16:11


 Vital Signs











Temperature  97.9 F   19 14:04


 


Pulse Rate  66   19 14:04


 


Respiratory Rate  18   19 14:04


 


Blood Pressure  103/64   19 14:04


 


O2 Sat by Pulse Oximetry (%)      








 Laboratory Tests











  19





  08:00 08:00 08:00


 


WBC  3.5 L  


 


RBC  4.56  


 


Hgb  13.4  


 


Hct  40.3  


 


MCV  88.3  


 


MCH  29.3  


 


MCHC  33.2  


 


RDW  14.2  


 


Plt Count  151  


 


MPV  10.6  


 


Sodium   141 


 


Potassium   3.7 


 


Chloride   110 H 


 


Carbon Dioxide   27 


 


Anion Gap   3 L 


 


BUN   8.1 


 


Creatinine   0.8 


 


Est GFR (CKD-EPI)AfAm   119.04 


 


Est GFR (CKD-EPI)NonAf   102.71 


 


Random Glucose   100 


 


Calcium   8.3 L 


 


Total Bilirubin   0.3 


 


AST   35 


 


ALT   30 


 


Alkaline Phosphatase   130 H 


 


Total Protein   6.3 L 


 


Albumin   2.6 L 


 


RPR Titer    Nonreactive








LABS NOTED.


Assessment: 





19 16:12


WITHDRAWAL SYMPTOMS.


ELEVATED ALKLAINE PHOSPHATASE LEVEL.


LEUKOPENIA.





19 16:13


Plan: 





CONTINUE DETOX.

## 2019-08-13 VITALS — TEMPERATURE: 97.3 F | DIASTOLIC BLOOD PRESSURE: 66 MMHG | SYSTOLIC BLOOD PRESSURE: 107 MMHG | HEART RATE: 69 BPM

## 2019-08-13 RX ADMIN — NICOTINE SCH MG: 21 PATCH TRANSDERMAL at 11:22

## 2019-08-13 RX ADMIN — Medication SCH TAB: at 11:20

## 2019-08-13 RX ADMIN — LAMIVUDINE AND ZIDOVUDINE SCH TABLET: 150; 300 TABLET, FILM COATED ORAL at 11:21

## 2019-08-13 RX ADMIN — RITONAVIR SCH MG: 100 TABLET, FILM COATED ORAL at 11:21

## 2019-08-13 NOTE — PN
S CIWA





- CIWA Score


Nausea/Vomitin-Mild Nausea/No Vomiting


Muscle Tremors: 2


Anxiety: 1-Mildly Anxious


Agitation: 1-Slight > Activity


Paroxysmal Sweats: No Perspiration


Orientation: 0-Oriented


Tacttile Disturbances: 0-None


Auditory Disturbances: 0-None


Visual Disturbances: 1-Very Mild Sensitivity


Headache: 1-Very Mild


CIWA-Ar Total Score: 7





B[facility] Progress Note (SOAP)


Subjective: 





alert,irritable,anxious,interrupted sleep,rash both groin


Objective: 





19 11:31


 Vital Signs











Temperature  97.9 F   19 09:32


 


Pulse Rate  65   19 09:32


 


Respiratory Rate  16   19 09:32


 


Blood Pressure  107/64   19 09:32


 


O2 Sat by Pulse Oximetry (%)      











Assessment: 





19 11:32


withdrawal symptom


Plan: 





continue detox librium regimen,would like to have a cane

## 2019-08-13 NOTE — DS
[facility] Detox Discharge Summary


Admission Date: 


08/10/19





Discharge Date: 08/13/19





- History


Present History: Alcohol Dependence, Cannabis Dependence, Cocaine Dependence, 

Opioid Dependence, K 2


Additional Comments: 





patient left AMA,to see his own Medical provider for follow up or to call 911 

if not feeling well


Pertinent Past History: 





asthma


anal cancer


hiv


nicotine dependence





- Physical Exam Results


Vital Signs: 


 Vital Signs











Temperature  97.3 F L  08/13/19 13:10


 


Pulse Rate  69   08/13/19 13:10


 


Respiratory Rate  18   08/13/19 13:10


 


Blood Pressure  107/66   08/13/19 13:10


 


O2 Sat by Pulse Oximetry (%)      











Pertinent Admission Physical Exam Findings: 





withdrawal signs and symptom


 Laboratory Last Values











WBC  3.5 K/mm3 (4.0-10.0)  L  08/11/19  08:00    


 


RBC  4.56 M/mm3 (4.00-5.60)   08/11/19  08:00    


 


Hgb  13.4 GM/dL (11.7-16.9)   08/11/19  08:00    


 


Hct  40.3 % (35.4-49)   08/11/19  08:00    


 


MCV  88.3 fl (80-96)   08/11/19  08:00    


 


MCH  29.3 pg (25.7-33.7)   08/11/19  08:00    


 


MCHC  33.2 g/dl (32.0-35.9)   08/11/19  08:00    


 


RDW  14.2 % (11.9-15.9)   08/11/19  08:00    


 


Plt Count  151 K/MM3 (134-434)   08/11/19  08:00    


 


MPV  10.6 fl (7.5-11.1)   08/11/19  08:00    


 


Sodium  141 mmol/L (136-145)   08/11/19  08:00    


 


Potassium  3.7 mmol/L (3.5-5.1)   08/11/19  08:00    


 


Chloride  110 mmol/L ()  H  08/11/19  08:00    


 


Carbon Dioxide  27 mmol/L (21-32)   08/11/19  08:00    


 


Anion Gap  3 MMOL/L (8-16)  L  08/11/19  08:00    


 


BUN  8.1 mg/dL (7-18)   08/11/19  08:00    


 


Creatinine  0.8 mg/dL (0.55-1.3)   08/11/19  08:00    


 


Est GFR (CKD-EPI)AfAm  119.04   08/11/19  08:00    


 


Est GFR (CKD-EPI)NonAf  102.71   08/11/19  08:00    


 


Random Glucose  100 mg/dL ()   08/11/19  08:00    


 


Calcium  8.3 mg/dL (8.5-10.1)  L  08/11/19  08:00    


 


Total Bilirubin  0.3 mg/dL (0.2-1)   08/11/19  08:00    


 


AST  35 U/L (15-37)   08/11/19  08:00    


 


ALT  30 U/L (13-61)   08/11/19  08:00    


 


Alkaline Phosphatase  130 U/L ()  H  08/11/19  08:00    


 


Total Protein  6.3 g/dl (6.4-8.2)  L  08/11/19  08:00    


 


Albumin  2.6 g/dl (3.4-5.0)  L  08/11/19  08:00    


 


RPR Titer  Nonreactive  (NONREACTIVE)   08/11/19  08:00    








 Vital Signs











Temperature  97.3 F L  08/13/19 13:10


 


Pulse Rate  69   08/13/19 13:10


 


Respiratory Rate  18   08/13/19 13:10


 


Blood Pressure  107/66   08/13/19 13:10


 


O2 Sat by Pulse Oximetry (%)      














- Medication


Discharge Medications: 


Ambulatory Orders





Atazanavir [Reyataz -] 300 mg PO DAILY 08/10/19 


Elviteg/Cob/Emtri/Tenof Alafen [Genvoya (Non-Formulary)] 1 each PO DAILY 08/10/

19 


Lamivudine/Zidovudine [Lamivudine-Zidovudine Tablet] 1 each PO BID 08/10/19 











- Diagnosis


(1) Alcohol dependence with uncomplicated withdrawal


Current Visit: No   Status: Acute   





(2) Asthma


Current Visit: No   Status: Acute   


Qualifiers: 


   Asthma severity: mild   Asthma persistence: intermittent   Asthma 

complication type: uncomplicated   Qualified Code(s): J45.20 - Mild 

intermittent asthma, uncomplicated   





(3) Cocaine dependence


Current Visit: No   Status: Acute   


Qualifiers: 


   Substance use status: in withdrawal   Qualified Code(s): F14.23 - Cocaine 

dependence with withdrawal   





(4) Marijuana dependence


Current Visit: No   Status: Acute   





(5) Nicotine dependence


Current Visit: No   Status: Acute   


Qualifiers: 


   Nicotine product type: cigarettes   Substance use status: uncomplicated   

Qualified Code(s): F17.210 - Nicotine dependence, cigarettes, uncomplicated   





(6) History of anal cancer


Current Visit: Yes   Status: Acute   





(7) Alcohol dependence with uncomplicated intoxication


Current Visit: Yes   Status: Acute   





- AMA


Did Patient Leave Against Medical Advice: Yes

## 2019-08-23 ENCOUNTER — HOSPITAL ENCOUNTER (INPATIENT)
Dept: HOSPITAL 74 - YASAS | Age: 54
LOS: 5 days | Discharge: HOME | End: 2019-08-28
Attending: SURGERY | Admitting: SURGERY
Payer: COMMERCIAL

## 2019-08-23 VITALS — BODY MASS INDEX: 22.7 KG/M2

## 2019-08-23 DIAGNOSIS — B30.9: ICD-10-CM

## 2019-08-23 DIAGNOSIS — F10.230: Primary | ICD-10-CM

## 2019-08-23 DIAGNOSIS — F14.20: ICD-10-CM

## 2019-08-23 DIAGNOSIS — B37.2: ICD-10-CM

## 2019-08-23 DIAGNOSIS — J45.20: ICD-10-CM

## 2019-08-23 DIAGNOSIS — F17.213: ICD-10-CM

## 2019-08-23 DIAGNOSIS — R74.8: ICD-10-CM

## 2019-08-23 DIAGNOSIS — D72.819: ICD-10-CM

## 2019-08-23 DIAGNOSIS — Z85.048: ICD-10-CM

## 2019-08-23 DIAGNOSIS — F12.20: ICD-10-CM

## 2019-08-23 DIAGNOSIS — Z21: ICD-10-CM

## 2019-08-23 PROCEDURE — HZ2ZZZZ DETOXIFICATION SERVICES FOR SUBSTANCE ABUSE TREATMENT: ICD-10-PCS | Performed by: ALLERGY & IMMUNOLOGY

## 2019-08-23 RX ADMIN — Medication PRN MG: at 22:53

## 2019-08-23 RX ADMIN — NYSTATIN SCH: 100000 POWDER TOPICAL at 22:59

## 2019-08-23 RX ADMIN — Medication SCH MG: at 22:55

## 2019-08-23 RX ADMIN — BACITRACIN SCH: 500 OINTMENT OPHTHALMIC at 22:59

## 2019-08-23 NOTE — HP
CIWA Score


Nausea/Vomitin-No Nausea/No Vomiting


Muscle Tremors: None


Anxiety: 4-Mod. Anxious/Guarded


Agitation: 4-Moderately Restless


Paroxysmal Sweats: No Perspiration


Orientation: 3-Disoriented Date>2 days


Tacttile Disturbances: 0-None


Auditory Disturbances: 0-None


Visual Disturbances: 0-None


Headache: 3-Moderate


CIWA-Ar Total Score: 14





- Admission Criteria


[facility]AS Guidelines: Admission for Medically Managed Detox: 


Requires at least one of the followin. CIWA greater than 12


2. Seizures within the past 24 hours


3. Delirium tremens within the past 24 hours


4. Hallucinations within the past 24 hours


5. Acute intervention needed for co  occurring medical disorder


6. Acute intervention needed for co  occurring psychiatric disorder


7. Severe withdrawal that cannot be handled at a lower level of care (continued


    vomiting, continued diarrhea, abnormal vital signs) requiring intravenous


    medication and/or fluids


8. Pregnancy





Patient presents the following: CIWA greater than 12


Admission Criteria Met: Admission criteria met





Admission ROS [facility]





- [facility]


Chief Complaint: 





c/o withdrawal x's. seeking detox


Allergies/Adverse Reactions: 


 Allergies











Allergy/AdvReac Type Severity Reaction Status Date / Time


 


No Known Allergies Allergy   Verified 08/10/19 12:31











History of Present Illness: 





54 y.o. male with alcoholism here for detox. client returns for admission after 

ama 10 days ago for what he reports as an infection in his leg that he need to 

care care of. he has been drinking alcohol daily since dc and returns with c/o 

withdrawal sx's. +ciwa, +eyeopener. denies any significant period of clean 

time. denies seizures, blackouts. he is an asthmatic and hx/o hiv. denies mh. 

lives alone, unemployed, parolee





note. client is not consistent with hiv meds. brings in a pill bottle labeled 

retonivir from 2019 that is still full. he is inconsistent with what he 

takes. discussed with client that i will not cont hiv meds given the 

inconsistency. client is to f/u with pmd upon dc. client verbalized 

understanding


Exam Limitations: No Limitations





- Ebola screening


Have you traveled outside of the country in the last 21 days: No (N)


Have you had contact with anyone from an Ebola affected area: No


Do you have a fever: No





- Review of Systems


Constitutional: Changes in sleep


EENT: reports: Dental Problems (dentures), Other (red irritated eyes with some 

teaRING AND SMALL DRY CRUST TO R INNER CANTHUS-?VIRAL CONJUNCTIVITIS VS 

BACTERIAL WILL TREAT PROPHYLACTILY)


Respiratory: reports: No Symptoms reported


Cardiac: reports: No Symptoms Reported


GI: reports: Constipated, Poor Fluid Intake


: reports: No Symptoms Reported


Musculoskeletal: reports: No Symptoms Reported


Integumentary: reports: Dryness


Neuro: reports: Headache


Endocrine: reports: No Symptoms Reported


Hematology: reports: No Symptoms Reported


Psychiatric: reports: Agitated (irritable), Depressed (denies si)


Other Systems: Reviewed and Negative





Patient History





- Patient Medical History


Hx Anemia: No


Hx Asthma: Yes (Albuterol)


Hx Chronic Obstructive Pulmonary Disease (COPD): No


Hx Cancer: No


Hx Cardiac Disorders: No


Hx Congestive Heart Failure: No


Hx Hypertension: No


Hx Hypercholesterolemia: No


Hx Pacemaker: No


HX Cerebrovascular Accident: No


Hx Seizures: No


Hx Dementia: No


Hx Diabetes: No


Hx Gastrointestinal Disorders: No


Hx Liver Disease: No


Hx Genitourinary Disorders: No


Hx Sexually Transmitted Disorders: No


Hx Renal Disease (ESRD): No


Hx Thyroid Disease: No


Hx Human Immunodeficiency Virus (HIV): Yes (since  on med)


Hx Hepatitis C: No


Hx Depression: No


Hx Suicide Attempt: No (Denies suicidal ideation at this time)


Hx Bipolar Disorder: No


Hx Schizophrenia: No





- Patient Surgical History


Past Surgical History: Yes


Hx Neurologic Surgery: No


Hx Cataract Extraction: No


Hx Cardiac Surgery: No


Hx Lung Surgery: No


Hx Breast Surgery: No


Hx Breast Biopsy: No


Hx Abdominal Surgery: No


Hx Appendectomy: No


Hx Cholecystectomy: No


Hx Genitourinary Surgery: No


Hx  Section: No


Hx Orthopedic Surgery: No


Other Surgical History: removal of anal cancer 0n 19 and 19 at 

[facility]


Anesthesia Reaction: No





- PPD History


Previous Implant?: Yes


Documented Results: Negative w/proof


Implanted On Prior R Admission?: No


Date: 08/10/19


Results: tb gold neg


PPD to be Administered?: No





- Smoking Cessation


Smoking history: Current every day smoker


Have you smoked in the past 12 months: Yes


Aproximately how many cigarettes per day: 40


Hx Chewing Tobacco Use: No


Initiated information on smoking cessation: Yes


'Breaking Loose' booklet given: 21





- Substances abused


  ** Alcohol


Substance route: Oral


Frequency: Daily


Amount used: 32 pack sixteen ounce beer, 2-3 (1/5)


Age of first use: 16


Date of last use: 19





  ** Heroin


Substance route: Inhalation


Frequency: 1-3 times last 30 days


Amount used: 10 bags


Age of first use: 16


Date of last use: 09





  ** Crack


Substance route: Smoking


Frequency: 1-3 times last 30 days


Amount used: 8 ball/day


Age of first use: 16


Date of last use: 19





  ** Marijuana/Hashish


Substance route: Smoking


Frequency: Daily


Amount used: 1 ounce


Age of first use: 16


Date of last use: 19





  ** K2/Spice


Substance route: Smoking


Frequency: Daily


Amount used: $50/week


Age of first use: 50


Date of last use: 19





Family Disease History





- Family Disease History


Family Disease History: Other: Mother (hrn)





Admission Physical Exam [facility]





- Vital Signs


Vital Signs: 


 Vital Signs - 24 hr











  19





  19:33


 


Temperature 97.1 F L


 


Pulse Rate 78


 


Respiratory 16





Rate 


 


Blood Pressure 110/67














- Physical


General Appearance: Yes: Mild Distress, Irritable


HEENTM: Yes: EOMI, Normocephalic, Normal Voice, FABIOLA, Pharynx Normal, Other (

dentures both)


Respiratory: Yes: Chest Non-Tender, Lungs Clear, Normal Breath Sounds, No 

Respiratory Distress, No Accessory Muscle Use


Neck: Yes: No masses,lesions,Nodules, Supple, Trachea in good position


Breast: Yes: Breast Exam Deferred


Cardiology: Yes: Regular Rhythm, Regular Rate, S1, S2


Abdominal: Yes: Normal Bowel Sounds, Non Tender, Soft


Genitourinary: Yes: Within Normal Limits


Back: Yes: Normal Inspection


Musculoskeletal: Yes: full range of Motion, Gait Steady


Extremities: Yes: Normal Capillary Refill, Normal Range of Motion, Non-Tender


Neurological: Yes: Alert, Motor Strength 5/5


Integumentary: Yes: Dry, Warm, Rash (to groin with weeping drainage 

hyperpigmented skin with open lesion- cutaneous candidiasis)


Lymphatic: Yes: Within Normal Limits





- Diagnostic


(1) Candidiasis of skin and nails


Current Visit: Yes   Status: Acute   





(2) Alcohol dependence with uncomplicated withdrawal


Current Visit: Yes   Status: Acute   





(3) Asthma


Current Visit: Yes   Status: Chronic   


Qualifiers: 


   Asthma severity: mild   Asthma persistence: intermittent   Asthma 

complication type: uncomplicated   Qualified Code(s): J45.20 - Mild 

intermittent asthma, uncomplicated   





(4) Cocaine dependence


Current Visit: Yes   Status: Acute   


Qualifiers: 


   Substance use status: in withdrawal   Qualified Code(s): F14.23 - Cocaine 

dependence with withdrawal   





(5) Marijuana dependence


Current Visit: Yes   Status: Acute   





(6) Nicotine dependence


Current Visit: Yes   Status: Chronic   


Qualifiers: 


   Nicotine product type: cigarettes   Substance use status: uncomplicated   

Qualified Code(s): F17.210 - Nicotine dependence, cigarettes, uncomplicated   





(7) Viral conjunctivitis of both eyes


Current Visit: Yes   Status: Acute   





Cleared for Admission S





- Detox or Rehab


S Level of Care: Medically Managed


Detox Regimen/Protocol: Librium


Claeared for Rehab Admission: No





Breathalyzer





- Breathalyzer


Breathalyzer: 0.083





Urine Drug Screen





- Test Device


Lot number: [id number]


Expiration date: 21





- Control


Is test valid?: Yes





- Results


Drug screen NEGATIVE: No


Urine drug screen results: THC-Marijuana, BZO-Benzodiazepines





Inpatient Rehab Admission





- Rehab Decision to Admit


Inpatient rehab admission?: No

## 2019-08-24 LAB
ALBUMIN SERPL-MCNC: 2.8 G/DL (ref 3.4–5)
ALP SERPL-CCNC: 143 U/L (ref 45–117)
ALT SERPL-CCNC: 23 U/L (ref 13–61)
ANION GAP SERPL CALC-SCNC: 6 MMOL/L (ref 8–16)
AST SERPL-CCNC: 28 U/L (ref 15–37)
BILIRUB SERPL-MCNC: 0.6 MG/DL (ref 0.2–1)
BUN SERPL-MCNC: 8.6 MG/DL (ref 7–18)
CALCIUM SERPL-MCNC: 8.6 MG/DL (ref 8.5–10.1)
CHLORIDE SERPL-SCNC: 110 MMOL/L (ref 98–107)
CO2 SERPL-SCNC: 27 MMOL/L (ref 21–32)
CREAT SERPL-MCNC: 0.7 MG/DL (ref 0.55–1.3)
DEPRECATED RDW RBC AUTO: 14.2 % (ref 11.9–15.9)
GLUCOSE SERPL-MCNC: 79 MG/DL (ref 74–106)
HCT VFR BLD CALC: 38.3 % (ref 35.4–49)
HGB BLD-MCNC: 12.9 GM/DL (ref 11.7–16.9)
MCH RBC QN AUTO: 29.9 PG (ref 25.7–33.7)
MCHC RBC AUTO-ENTMCNC: 33.8 G/DL (ref 32–35.9)
MCV RBC: 88.4 FL (ref 80–96)
PLATELET # BLD AUTO: 268 K/MM3 (ref 134–434)
PMV BLD: 9.6 FL (ref 7.5–11.1)
POTASSIUM SERPLBLD-SCNC: 4.1 MMOL/L (ref 3.5–5.1)
PROT SERPL-MCNC: 7 G/DL (ref 6.4–8.2)
RBC # BLD AUTO: 4.33 M/MM3 (ref 4–5.6)
SODIUM SERPL-SCNC: 142 MMOL/L (ref 136–145)
WBC # BLD AUTO: 3.1 K/MM3 (ref 4–10)

## 2019-08-24 RX ADMIN — BACITRACIN SCH: 500 OINTMENT OPHTHALMIC at 02:15

## 2019-08-24 RX ADMIN — NYSTATIN SCH APPLIC: 100000 POWDER TOPICAL at 14:46

## 2019-08-24 RX ADMIN — Medication SCH APPLIC: at 22:45

## 2019-08-24 RX ADMIN — Medication SCH TAB: at 10:48

## 2019-08-24 RX ADMIN — BACITRACIN SCH: 500 OINTMENT OPHTHALMIC at 05:47

## 2019-08-24 RX ADMIN — Medication SCH MG: at 22:19

## 2019-08-24 RX ADMIN — BACITRACIN SCH APPLIC: 500 OINTMENT OPHTHALMIC at 10:46

## 2019-08-24 RX ADMIN — BACITRACIN SCH APPLIC: 500 OINTMENT OPHTHALMIC at 22:19

## 2019-08-24 RX ADMIN — BACITRACIN SCH APPLIC: 500 OINTMENT OPHTHALMIC at 14:45

## 2019-08-24 RX ADMIN — Medication PRN MG: at 22:20

## 2019-08-24 RX ADMIN — METHOCARBAMOL PRN MG: 500 TABLET ORAL at 22:20

## 2019-08-24 RX ADMIN — BACITRACIN SCH APPLIC: 500 OINTMENT OPHTHALMIC at 17:56

## 2019-08-24 RX ADMIN — NYSTATIN SCH: 100000 POWDER TOPICAL at 22:46

## 2019-08-24 RX ADMIN — NICOTINE SCH MG: 21 PATCH TRANSDERMAL at 10:47

## 2019-08-24 RX ADMIN — NYSTATIN SCH: 100000 POWDER TOPICAL at 06:50

## 2019-08-24 RX ADMIN — MAGNESIUM HYDROXIDE PRN ML: 400 SUSPENSION ORAL at 22:20

## 2019-08-24 NOTE — PN
[facility] CIWA





- CIWA Score


Nausea/Vomitin-No Nausea/No Vomiting


Muscle Tremors: None


Anxiety: 4-Mod. Anxious/Guarded


Agitation: 3


Paroxysmal Sweats: 2


Orientation: 0-Oriented


Tacttile Disturbances: 3-Moderate Itch/Numb/Burn


Auditory Disturbances: 0-None


Visual Disturbances: 0-None


Headache: 2-Mild


CIWA-Ar Total Score: 14





[facility] Progress Note (SOAP)


Subjective: 





Anxious, Sweating, H/A.


Objective: 


PATIENT A & O X 3, OBSERVED AMBULATING ON UNIT UNASSISTED. IN NO ACUTE DISTRESS.





19 14:06


 Vital Signs











Temperature  97.1 F L  19 13:14


 


Pulse Rate  82   19 13:14


 


Respiratory Rate  18   19 13:14


 


Blood Pressure  104/78   19 13:14


 


O2 Sat by Pulse Oximetry (%)      








 Laboratory Tests











  19





  08:00 08:00 08:00


 


WBC  3.1 L  


 


RBC  4.33  


 


Hgb  12.9  


 


Hct  38.3  


 


MCV  88.4  


 


MCH  29.9  


 


MCHC  33.8  


 


RDW  14.2  


 


Plt Count  268  D  


 


MPV  9.6  


 


Sodium   142 


 


Potassium   4.1 


 


Chloride   110 H 


 


Carbon Dioxide   27 


 


Anion Gap   6 L 


 


BUN   8.6 


 


Creatinine   0.7 


 


Est GFR (CKD-EPI)AfAm   125.75 


 


Est GFR (CKD-EPI)NonAf   108.50 


 


Random Glucose   79 


 


Calcium   8.6 


 


Total Bilirubin   0.6 


 


AST   28 


 


ALT   23 


 


Alkaline Phosphatase   143 H 


 


Total Protein   7.0 


 


Albumin   2.8 L 


 


RPR Titer    Nonreactive











LABS NOTED.


PATIENT HAS HAD LOW WBC LEVELS AND ELEVATED AP LEVELS ON PREVIOUS ADMISSIONS.





19 14:08


Assessment: 





19 14:07


WITHDRAWAL SYMPTOMS.


LEUKOPENIA.


ELEAVED AP LEVEL.





19 14:07


Plan: 





CONTINUE DETOX.





KETOCONAZOLE, 200 MG PO DAILY (X 5 DAYS), BROUGHT IN BY PATIENT AT TIME OF 

ADMISSION TO DETOX FOR TREATMENT OF RASH ON BILATERAL UPPER LEGS, AS PRESCRIBED 

BY OUTSIDE MEDICAL PROVIDER. AS THIS MEDICATION IS NOT CURRENTLY PART OF [facility] 

PHARMACY FORMULARY, WILL USE PATIENT'S OWN MEDICATION WHILE HE IS ADMITTED ON 

DETOX UNIT.

## 2019-08-25 RX ADMIN — KETOCONAZOLE SCH MG: 200 TABLET ORAL at 10:38

## 2019-08-25 RX ADMIN — BACITRACIN SCH APPLIC: 500 OINTMENT OPHTHALMIC at 17:37

## 2019-08-25 RX ADMIN — BACITRACIN SCH APPLIC: 500 OINTMENT OPHTHALMIC at 06:43

## 2019-08-25 RX ADMIN — Medication SCH APPLIC: at 22:17

## 2019-08-25 RX ADMIN — Medication PRN MG: at 22:17

## 2019-08-25 RX ADMIN — IBUPROFEN PRN MG: 400 TABLET, FILM COATED ORAL at 17:38

## 2019-08-25 RX ADMIN — NICOTINE SCH MG: 21 PATCH TRANSDERMAL at 10:39

## 2019-08-25 RX ADMIN — NYSTATIN SCH APPLIC: 100000 POWDER TOPICAL at 22:17

## 2019-08-25 RX ADMIN — Medication SCH APPLIC: at 10:39

## 2019-08-25 RX ADMIN — BACITRACIN SCH: 500 OINTMENT OPHTHALMIC at 14:56

## 2019-08-25 RX ADMIN — NYSTATIN SCH: 100000 POWDER TOPICAL at 14:56

## 2019-08-25 RX ADMIN — NYSTATIN SCH: 100000 POWDER TOPICAL at 07:50

## 2019-08-25 RX ADMIN — Medication SCH TAB: at 10:38

## 2019-08-25 RX ADMIN — BACITRACIN SCH: 500 OINTMENT OPHTHALMIC at 06:04

## 2019-08-25 RX ADMIN — BACITRACIN SCH: 500 OINTMENT OPHTHALMIC at 22:23

## 2019-08-25 RX ADMIN — ACETAMINOPHEN PRN MG: 325 TABLET ORAL at 03:16

## 2019-08-25 RX ADMIN — HYDROXYZINE PAMOATE PRN MG: 25 CAPSULE ORAL at 03:16

## 2019-08-25 RX ADMIN — BACITRACIN SCH APPLIC: 500 OINTMENT OPHTHALMIC at 10:38

## 2019-08-25 RX ADMIN — Medication SCH MG: at 22:16

## 2019-08-25 RX ADMIN — MAGNESIUM CITRATE PRN ML: 1.75 LIQUID ORAL at 22:19

## 2019-08-25 NOTE — PN
[facility] CIWA





- CIWA Score


Nausea/Vomitin-No Nausea/No Vomiting


Muscle Tremors: 2


Anxiety: 3


Agitation: 2


Paroxysmal Sweats: 2


Orientation: 0-Oriented


Tacttile Disturbances: 1-Very Mild Itch/Numbness


Auditory Disturbances: 0-None


Visual Disturbances: 0-None


Headache: 1-Very Mild


CIWA-Ar Total Score: 11





BHS Progress Note (SOAP)


Subjective: 





54 years old male admitted on 19 for acute alcohol withdrawal sx 

management 


doing well with libirum derox regimen


sleep better at night well rested mild nausea no vomiting





Objective: 





19 14:25


 Vital Signs











Temperature  98.4 F   19 13:15


 


Pulse Rate  80   19 13:15


 


Respiratory Rate  16   19 13:15


 


Blood Pressure  116/77   19 13:15


 


O2 Sat by Pulse Oximetry (%)      








 Laboratory Last Values











WBC  3.1 K/mm3 (4.0-10.0)  L  19  08:00    


 


RBC  4.33 M/mm3 (4.00-5.60)   19  08:00    


 


Hgb  12.9 GM/dL (11.7-16.9)   19  08:00    


 


Hct  38.3 % (35.4-49)   19  08:00    


 


MCV  88.4 fl (80-96)   19  08:00    


 


MCH  29.9 pg (25.7-33.7)   19  08:00    


 


MCHC  33.8 g/dl (32.0-35.9)   19  08:00    


 


RDW  14.2 % (11.9-15.9)   19  08:00    


 


Plt Count  268 K/MM3 (134-434)  D 19  08:00    


 


MPV  9.6 fl (7.5-11.1)   19  08:00    


 


Sodium  142 mmol/L (136-145)   19  08:00    


 


Potassium  4.1 mmol/L (3.5-5.1)   19  08:00    


 


Chloride  110 mmol/L ()  H  19  08:00    


 


Carbon Dioxide  27 mmol/L (21-32)   19  08:00    


 


Anion Gap  6 MMOL/L (8-16)  L  19  08:00    


 


BUN  8.6 mg/dL (7-18)   19  08:00    


 


Creatinine  0.7 mg/dL (0.55-1.3)   19  08:00    


 


Est GFR (CKD-EPI)AfAm  125.75   19  08:00    


 


Est GFR (CKD-EPI)NonAf  108.50   19  08:00    


 


Random Glucose  79 mg/dL ()   19  08:00    


 


Calcium  8.6 mg/dL (8.5-10.1)   19  08:00    


 


Total Bilirubin  0.6 mg/dL (0.2-1)   19  08:00    


 


AST  28 U/L (15-37)   19  08:00    


 


ALT  23 U/L (13-61)   19  08:00    


 


Alkaline Phosphatase  143 U/L ()  H  19  08:00    


 


Total Protein  7.0 g/dl (6.4-8.2)   19  08:00    


 


Albumin  2.8 g/dl (3.4-5.0)  L  19  08:00    


 


RPR Titer  Nonreactive  (NONREACTIVE)   19  08:00    








lab noted


Assessment: 





19 14:25


alcohol withdrawal sx


Plan: 





continue libirum detox regimen

## 2019-08-26 RX ADMIN — NICOTINE SCH MG: 21 PATCH TRANSDERMAL at 10:41

## 2019-08-26 RX ADMIN — NYSTATIN SCH: 100000 POWDER TOPICAL at 05:50

## 2019-08-26 RX ADMIN — NYSTATIN SCH: 100000 POWDER TOPICAL at 15:08

## 2019-08-26 RX ADMIN — BACITRACIN SCH: 500 OINTMENT OPHTHALMIC at 15:08

## 2019-08-26 RX ADMIN — Medication SCH APPLIC: at 10:43

## 2019-08-26 RX ADMIN — BACITRACIN SCH: 500 OINTMENT OPHTHALMIC at 05:50

## 2019-08-26 RX ADMIN — BACITRACIN SCH APPLIC: 500 OINTMENT OPHTHALMIC at 17:13

## 2019-08-26 RX ADMIN — Medication SCH TAB: at 10:42

## 2019-08-26 RX ADMIN — HYDROXYZINE PAMOATE PRN MG: 25 CAPSULE ORAL at 03:25

## 2019-08-26 RX ADMIN — Medication SCH MG: at 22:40

## 2019-08-26 RX ADMIN — MAGNESIUM CITRATE PRN ML: 1.75 LIQUID ORAL at 22:12

## 2019-08-26 RX ADMIN — NYSTATIN SCH APPLIC: 100000 POWDER TOPICAL at 22:39

## 2019-08-26 RX ADMIN — BACITRACIN SCH: 500 OINTMENT OPHTHALMIC at 01:57

## 2019-08-26 RX ADMIN — BACITRACIN SCH APPLIC: 500 OINTMENT OPHTHALMIC at 22:38

## 2019-08-26 RX ADMIN — KETOCONAZOLE SCH MG: 200 TABLET ORAL at 10:41

## 2019-08-26 RX ADMIN — BACITRACIN SCH APPLIC: 500 OINTMENT OPHTHALMIC at 10:43

## 2019-08-26 RX ADMIN — Medication SCH APPLIC: at 22:39

## 2019-08-26 RX ADMIN — Medication PRN MG: at 22:14

## 2019-08-26 NOTE — PN
[facility] CIWA





- CIWA Score


Nausea/Vomitin-No Nausea/No Vomiting


Muscle Tremors: 3


Anxiety: 2


Agitation: 2


Paroxysmal Sweats: 2


Orientation: 0-Oriented


Tacttile Disturbances: 0-None


Auditory Disturbances: 0-None


Visual Disturbances: 0-None


Headache: 1-Very Mild


CIWA-Ar Total Score: 10





BHS Progress Note (SOAP)


Subjective: 





doing well with librium detox regimen


sleep better at night tolerate food and fluid well denies dizziness 


social with peers in day room


Objective: 





19 11:58


 Vital Signs











Temperature  97.4 F L  19 09:29


 


Pulse Rate  60   19 09:29


 


Respiratory Rate  16   19 09:29


 


Blood Pressure  100/58 L  19 09:29


 


O2 Sat by Pulse Oximetry (%)      








 Laboratory Last Values











WBC  3.1 K/mm3 (4.0-10.0)  L  19  08:00    


 


RBC  4.33 M/mm3 (4.00-5.60)   19  08:00    


 


Hgb  12.9 GM/dL (11.7-16.9)   19  08:00    


 


Hct  38.3 % (35.4-49)   19  08:00    


 


MCV  88.4 fl (80-96)   19  08:00    


 


MCH  29.9 pg (25.7-33.7)   19  08:00    


 


MCHC  33.8 g/dl (32.0-35.9)   19  08:00    


 


RDW  14.2 % (11.9-15.9)   19  08:00    


 


Plt Count  268 K/MM3 (134-434)  D 19  08:00    


 


MPV  9.6 fl (7.5-11.1)   19  08:00    


 


Sodium  142 mmol/L (136-145)   19  08:00    


 


Potassium  4.1 mmol/L (3.5-5.1)   19  08:00    


 


Chloride  110 mmol/L ()  H  19  08:00    


 


Carbon Dioxide  27 mmol/L (21-32)   19  08:00    


 


Anion Gap  6 MMOL/L (8-16)  L  19  08:00    


 


BUN  8.6 mg/dL (7-18)   19  08:00    


 


Creatinine  0.7 mg/dL (0.55-1.3)   19  08:00    


 


Est GFR (CKD-EPI)AfAm  125.75   19  08:00    


 


Est GFR (CKD-EPI)NonAf  108.50   19  08:00    


 


Random Glucose  79 mg/dL ()   19  08:00    


 


Calcium  8.6 mg/dL (8.5-10.1)   19  08:00    


 


Total Bilirubin  0.6 mg/dL (0.2-1)   19  08:00    


 


AST  28 U/L (15-37)   19  08:00    


 


ALT  23 U/L (13-61)   19  08:00    


 


Alkaline Phosphatase  143 U/L ()  H  19  08:00    


 


Total Protein  7.0 g/dl (6.4-8.2)   19  08:00    


 


Albumin  2.8 g/dl (3.4-5.0)  L  19  08:00    


 


RPR Titer  Nonreactive  (NONREACTIVE)   19  08:00    








lab noted


Assessment: 





19 11:59


alcohol withdrawal sx


alert oriented x 3 steady gait 


encourage attend groups and discuss aftercare with staff


Plan: 





continue librium detox regimen

## 2019-08-27 RX ADMIN — ACETAMINOPHEN PRN MG: 325 TABLET ORAL at 17:05

## 2019-08-27 RX ADMIN — Medication SCH APPLIC: at 22:13

## 2019-08-27 RX ADMIN — BACITRACIN SCH APPLIC: 500 OINTMENT OPHTHALMIC at 10:19

## 2019-08-27 RX ADMIN — BACITRACIN SCH APPLIC: 500 OINTMENT OPHTHALMIC at 22:12

## 2019-08-27 RX ADMIN — HYDROXYZINE PAMOATE PRN MG: 25 CAPSULE ORAL at 03:24

## 2019-08-27 RX ADMIN — Medication SCH TAB: at 10:21

## 2019-08-27 RX ADMIN — IBUPROFEN PRN MG: 400 TABLET, FILM COATED ORAL at 03:24

## 2019-08-27 RX ADMIN — Medication SCH MG: at 22:12

## 2019-08-27 RX ADMIN — BACITRACIN SCH: 500 OINTMENT OPHTHALMIC at 15:36

## 2019-08-27 RX ADMIN — NICOTINE SCH MG: 21 PATCH TRANSDERMAL at 10:21

## 2019-08-27 RX ADMIN — Medication PRN MG: at 22:12

## 2019-08-27 RX ADMIN — KETOCONAZOLE SCH MG: 200 TABLET ORAL at 10:21

## 2019-08-27 RX ADMIN — BACITRACIN SCH APPLIC: 500 OINTMENT OPHTHALMIC at 17:03

## 2019-08-27 RX ADMIN — BACITRACIN SCH: 500 OINTMENT OPHTHALMIC at 02:06

## 2019-08-27 RX ADMIN — METHOCARBAMOL PRN MG: 500 TABLET ORAL at 03:24

## 2019-08-27 RX ADMIN — NYSTATIN SCH: 100000 POWDER TOPICAL at 15:36

## 2019-08-27 RX ADMIN — BACITRACIN SCH: 500 OINTMENT OPHTHALMIC at 06:44

## 2019-08-27 RX ADMIN — NYSTATIN SCH APPLIC: 100000 POWDER TOPICAL at 22:12

## 2019-08-27 RX ADMIN — NYSTATIN SCH: 100000 POWDER TOPICAL at 06:44

## 2019-08-27 RX ADMIN — Medication SCH APPLIC: at 10:20

## 2019-08-27 NOTE — PN
[facility] CIWA





- CIWA Score


Nausea/Vomitin-No Nausea/No Vomiting


Muscle Tremors: 1-None Visible, but Felt


Anxiety: 1-Mildly Anxious


Agitation: 1-Slight > Activity


Paroxysmal Sweats: 1-Minimal Palms Moist


Orientation: 0-Oriented


Tacttile Disturbances: 1-Very Mild Itch/Numbness (itching)


Auditory Disturbances: 0-None


Visual Disturbances: 0-None


Headache: 0-None Present


CIWA-Ar Total Score: 5





BHS Progress Note (SOAP)


Subjective: 





doing well with librium detox regimen 


sleep better at night ensure supplement due to hiv status and history of colon 

cancer "had two surgery"


not taking HIV medication "a long time"


discuss aftercare with staff prefers [facility] 





Objective: 





19 13:44


 Vital Signs











Temperature  97.4 F L  19 13:18


 


Pulse Rate  57 L  19 13:18


 


Respiratory Rate  16   19 13:18


 


Blood Pressure  90/59 L  19 13:18


 


O2 Sat by Pulse Oximetry (%)      








 Laboratory Last Values











WBC  3.1 K/mm3 (4.0-10.0)  L  19  08:00    


 


RBC  4.33 M/mm3 (4.00-5.60)   19  08:00    


 


Hgb  12.9 GM/dL (11.7-16.9)   19  08:00    


 


Hct  38.3 % (35.4-49)   19  08:00    


 


MCV  88.4 fl (80-96)   19  08:00    


 


MCH  29.9 pg (25.7-33.7)   19  08:00    


 


MCHC  33.8 g/dl (32.0-35.9)   19  08:00    


 


RDW  14.2 % (11.9-15.9)   19  08:00    


 


Plt Count  268 K/MM3 (134-434)  D 19  08:00    


 


MPV  9.6 fl (7.5-11.1)   19  08:00    


 


Sodium  142 mmol/L (136-145)   19  08:00    


 


Potassium  4.1 mmol/L (3.5-5.1)   19  08:00    


 


Chloride  110 mmol/L ()  H  08/24/19  08:00    


 


Carbon Dioxide  27 mmol/L (21-32)   19  08:00    


 


Anion Gap  6 MMOL/L (8-16)  L  19  08:00    


 


BUN  8.6 mg/dL (7-18)   19  08:00    


 


Creatinine  0.7 mg/dL (0.55-1.3)   19  08:00    


 


Est GFR (CKD-EPI)AfAm  125.75   19  08:00    


 


Est GFR (CKD-EPI)NonAf  108.50   19  08:00    


 


Random Glucose  79 mg/dL ()   19  08:00    


 


Calcium  8.6 mg/dL (8.5-10.1)   19  08:00    


 


Total Bilirubin  0.6 mg/dL (0.2-1)   19  08:00    


 


AST  28 U/L (15-37)   19  08:00    


 


ALT  23 U/L (13-61)   19  08:00    


 


Alkaline Phosphatase  143 U/L ()  H  19  08:00    


 


Total Protein  7.0 g/dl (6.4-8.2)   19  08:00    


 


Albumin  2.8 g/dl (3.4-5.0)  L  19  08:00    


 


RPR Titer  Nonreactive  (NONREACTIVE)   19  08:00    








lab noted


Assessment: 





19 13:44


alcohol withdrawal sx


alert oriented x 3 speech slow but clear and coherent


19 14:15


resting on bed "no bowel movement for one day"


no nausea no vomiting tolerate food and fluid well 


Plan: 





continue libirum detox regimen

## 2019-08-28 VITALS — HEART RATE: 68 BPM | SYSTOLIC BLOOD PRESSURE: 116 MMHG | DIASTOLIC BLOOD PRESSURE: 72 MMHG | TEMPERATURE: 96.2 F

## 2019-08-28 RX ADMIN — BACITRACIN SCH: 500 OINTMENT OPHTHALMIC at 02:02

## 2019-08-28 RX ADMIN — BACITRACIN SCH: 500 OINTMENT OPHTHALMIC at 06:16

## 2019-08-28 RX ADMIN — MAGNESIUM HYDROXIDE PRN ML: 400 SUSPENSION ORAL at 00:42

## 2019-08-28 RX ADMIN — NYSTATIN SCH: 100000 POWDER TOPICAL at 06:17

## 2019-08-28 RX ADMIN — METHOCARBAMOL PRN MG: 500 TABLET ORAL at 04:57

## 2019-08-28 RX ADMIN — IBUPROFEN PRN MG: 400 TABLET, FILM COATED ORAL at 04:57

## 2019-08-28 NOTE — DS
[facility] Detox Discharge Summary


Admission Date: 


19





Discharge Date: 19





- History


Present History: Alcohol Dependence


Additional Comments: 





54 years old male multiple patient [facility] detox and rehab admission was 

admitted on 19 for acute alcohol withdrawal sx management did well with 

libirum detox regimen no complication through out the detox stay 


patient is alert oriented x 3 speech clearly steady gait ambulating from bed to 

bathroom fecal incontinent wear adult pamper mild rashes on both gluteal cheeks 

around anus related to colon cancer with "two surgeries" 





patient wants to go to [facility] for inpatient alcohol rehab recovery


Pertinent Past History: 





transportation arranged from detox to inpatient rehab


hiv colon ca fecal incontinence





- Physical Exam Results


Vital Signs: 


 Vital Signs











Temperature  96.2 F L  19 09:06


 


Pulse Rate  68   19 09:06


 


Respiratory Rate  18   19 09:06


 


Blood Pressure  116/72   19 09:06


 


O2 Sat by Pulse Oximetry (%)      











Pertinent Admission Physical Exam Findings: 





alcohol withdrawal sx


 Laboratory Last Values











WBC  3.1 K/mm3 (4.0-10.0)  L  19  08:00    


 


RBC  4.33 M/mm3 (4.00-5.60)   19  08:00    


 


Hgb  12.9 GM/dL (11.7-16.9)   19  08:00    


 


Hct  38.3 % (35.4-49)   19  08:00    


 


MCV  88.4 fl (80-96)   19  08:00    


 


MCH  29.9 pg (25.7-33.7)   19  08:00    


 


MCHC  33.8 g/dl (32.0-35.9)   19  08:00    


 


RDW  14.2 % (11.9-15.9)   19  08:00    


 


Plt Count  268 K/MM3 (134-434)  D 19  08:00    


 


MPV  9.6 fl (7.5-11.1)   19  08:00    


 


Sodium  142 mmol/L (136-145)   19  08:00    


 


Potassium  4.1 mmol/L (3.5-5.1)   19  08:00    


 


Chloride  110 mmol/L ()  H  19  08:00    


 


Carbon Dioxide  27 mmol/L (21-32)   19  08:00    


 


Anion Gap  6 MMOL/L (8-16)  L  19  08:00    


 


BUN  8.6 mg/dL (7-18)   19  08:00    


 


Creatinine  0.7 mg/dL (0.55-1.3)   19  08:00    


 


Est GFR (CKD-EPI)AfAm  125.75   19  08:00    


 


Est GFR (CKD-EPI)NonAf  108.50   19  08:00    


 


Random Glucose  79 mg/dL ()   19  08:00    


 


Calcium  8.6 mg/dL (8.5-10.1)   19  08:00    


 


Total Bilirubin  0.6 mg/dL (0.2-1)   19  08:00    


 


AST  28 U/L (15-37)   19  08:00    


 


ALT  23 U/L (13-61)   19  08:00    


 


Alkaline Phosphatase  143 U/L ()  H  19  08:00    


 


Total Protein  7.0 g/dl (6.4-8.2)   19  08:00    


 


Albumin  2.8 g/dl (3.4-5.0)  L  19  08:00    


 


RPR Titer  Nonreactive  (NONREACTIVE)   19  08:00    








lab noted





- Treatment


Hospital Course: Detox Protocol Followed, Detoxed Safely, Responded well, 

Discharged Condition Good, Rehab Referral Accepted


Patient has Accepted a Rehab Referral to: [facility]





- Medication


Discharge Medications: 


Ambulatory Orders





Atazanavir [Reyataz -] 300 mg PO DAILY 08/10/19 


Elviteg/Cob/Emtri/Tenof Alafen [Genvoya (Non-Formulary)] 1 each PO DAILY 08/10/

19 


Lamivudine/Zidovudine [Lamivudine-Zidovudine Tablet] 1 each PO BID 08/10/19 


Ketoconazole 200 mg PO DAILY 19 


Ritonavir 100 mg PO DAILY 19 











- Diagnosis


(1) Alcohol dependence with uncomplicated intoxication


Status: Acute   





(2) Asthma


Status: Chronic   


Qualifiers: 


   Asthma severity: mild   Asthma persistence: intermittent   Asthma 

complication type: uncomplicated   Qualified Code(s): J45.20 - Mild 

intermittent asthma, uncomplicated   





(3) Nicotine dependence


Status: Acute   


Qualifiers: 


   Nicotine product type: cigarettes   Substance use status: in withdrawal   

Qualified Code(s): F17.213 - Nicotine dependence, cigarettes, with withdrawal   





- AMA


Did Patient Leave Against Medical Advice: No





CIWA Score





- CIWA Score


Nausea/Vomitin-No Nausea/No Vomiting


Muscle Tremors: 1-None Visible, but Felt


Anxiety: 0-No Anxiety, at Ease


Agitation: 0-Normal Activity


Paroxysmal Sweats: No Perspiration


Orientation: 0-Oriented


Tacttile Disturbances: 1-Very Mild Itch/Numbness (itching)


Auditory Disturbances: 0-None


Visual Disturbances: 0-None


Headache: 0-None Present


CIWA-Ar Total Score: 2

## 2020-01-05 ENCOUNTER — HOSPITAL ENCOUNTER (INPATIENT)
Dept: HOSPITAL 74 - YASAS | Age: 55
LOS: 1 days | Discharge: LEFT BEFORE DISCHARGE | DRG: 770 | End: 2020-01-06
Attending: ALLERGY & IMMUNOLOGY | Admitting: ALLERGY & IMMUNOLOGY
Payer: COMMERCIAL

## 2020-01-05 VITALS — BODY MASS INDEX: 19.9 KG/M2

## 2020-01-05 DIAGNOSIS — F11.23: Primary | ICD-10-CM

## 2020-01-05 DIAGNOSIS — F14.20: ICD-10-CM

## 2020-01-05 DIAGNOSIS — Z85.048: ICD-10-CM

## 2020-01-05 DIAGNOSIS — B30.9: ICD-10-CM

## 2020-01-05 DIAGNOSIS — R63.4: ICD-10-CM

## 2020-01-05 DIAGNOSIS — Z91.14: ICD-10-CM

## 2020-01-05 DIAGNOSIS — F10.230: ICD-10-CM

## 2020-01-05 DIAGNOSIS — F17.210: ICD-10-CM

## 2020-01-05 DIAGNOSIS — F12.20: ICD-10-CM

## 2020-01-05 DIAGNOSIS — R74.0: ICD-10-CM

## 2020-01-05 DIAGNOSIS — Z21: ICD-10-CM

## 2020-01-05 PROCEDURE — HZ2ZZZZ DETOXIFICATION SERVICES FOR SUBSTANCE ABUSE TREATMENT: ICD-10-PCS | Performed by: ALLERGY & IMMUNOLOGY

## 2020-01-05 RX ADMIN — NICOTINE SCH MG: 21 PATCH TRANSDERMAL at 18:17
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
## 2020-01-06 VITALS — DIASTOLIC BLOOD PRESSURE: 63 MMHG | SYSTOLIC BLOOD PRESSURE: 100 MMHG | TEMPERATURE: 97.4 F | HEART RATE: 74 BPM

## 2020-01-06 LAB
ALBUMIN SERPL-MCNC: 2.6 G/DL (ref 3.4–5)
ALP SERPL-CCNC: 145 U/L (ref 45–117)
ALT SERPL-CCNC: 26 U/L (ref 13–61)
ANION GAP SERPL CALC-SCNC: 4 MMOL/L (ref 8–16)
AST SERPL-CCNC: 33 U/L (ref 15–37)
BILIRUB SERPL-MCNC: 0.5 MG/DL (ref 0.2–1)
BUN SERPL-MCNC: 5.1 MG/DL (ref 7–18)
CALCIUM SERPL-MCNC: 8.3 MG/DL (ref 8.5–10.1)
CHLORIDE SERPL-SCNC: 106 MMOL/L (ref 98–107)
CO2 SERPL-SCNC: 28 MMOL/L (ref 21–32)
CREAT SERPL-MCNC: 0.8 MG/DL (ref 0.55–1.3)
DEPRECATED RDW RBC AUTO: 14.8 % (ref 11.9–15.9)
GLUCOSE SERPL-MCNC: 84 MG/DL (ref 74–106)
HCT VFR BLD CALC: 39 % (ref 35.4–49)
HGB BLD-MCNC: 13.1 GM/DL (ref 11.7–16.9)
MCH RBC QN AUTO: 28.5 PG (ref 25.7–33.7)
MCHC RBC AUTO-ENTMCNC: 33.5 G/DL (ref 32–35.9)
MCV RBC: 85.1 FL (ref 80–96)
PLATELET # BLD AUTO: 121 K/MM3 (ref 134–434)
PMV BLD: 10.4 FL (ref 7.5–11.1)
POTASSIUM SERPLBLD-SCNC: 3.6 MMOL/L (ref 3.5–5.1)
PROT SERPL-MCNC: 7 G/DL (ref 6.4–8.2)
RBC # BLD AUTO: 4.58 M/MM3 (ref 4–5.6)
SODIUM SERPL-SCNC: 138 MMOL/L (ref 136–145)
WBC # BLD AUTO: 3 K/MM3 (ref 4–10)

## 2020-01-06 RX ADMIN — NICOTINE SCH MG: 21 PATCH TRANSDERMAL at 10:34

## 2020-01-06 NOTE — DS
[facility] Detox Discharge Summary


Admission Date: 


01/05/20








- History


Present History: Alcohol Dependence, Cocaine Dependence, Opioid Dependence





- Physical Exam Results


Vital Signs: 


 Vital Signs











Temperature  97.4 F L  01/06/20 09:26


 


Pulse Rate  74   01/06/20 09:26


 


Respiratory Rate  18   01/06/20 09:26


 


Blood Pressure  100/63   01/06/20 09:26


 


O2 Sat by Pulse Oximetry (%)      














- Treatment


Hospital Course: Rehab Referral Accepted


Patient has Accepted a Rehab Referral to: pt declined rehab





- Medication


Discharge Medications: 


Ambulatory Orders





Elviteg/Cob/Emtri/Tenof Alafen [Genvoya (Non-Formulary)] 1 each PO DAILY 08/10/

19 


Lamivudine/Zidovudine [Lamivudine-Zidovudine Tablet] 1 each PO BID 08/10/19 


Ritonavir 100 mg PO DAILY 08/23/19 











- Diagnosis


(1) Alcohol dependence with uncomplicated intoxication


Current Visit: Yes   Status: Chronic   





(2) Candidiasis of skin and nails


Current Visit: No   Status: Acute   





(3) Cocaine dependence


Current Visit: Yes   Status: Chronic   


Qualifiers: 


   Substance use status: uncomplicated   Qualified Code(s): F14.20 - Cocaine 

dependence, uncomplicated   





(4) Elevated alkaline phosphatase level


Current Visit: No   Status: Acute   





(5) History of anal cancer


Current Visit: No   Status: Chronic   





(6) Nicotine dependence


Current Visit: Yes   Status: Chronic   


Qualifiers: 


   Nicotine product type: cigarettes   Substance use status: uncomplicated   

Qualified Code(s): F17.210 - Nicotine dependence, cigarettes, uncomplicated   





(7) Opioid dependence with withdrawal


Current Visit: No   Status: Acute   





(8) Viral conjunctivitis of both eyes


Current Visit: No   Status: Acute   





(9) Asthma


Current Visit: No   Status: Chronic   


Qualifiers: 


   Asthma severity: mild   Asthma persistence: intermittent   Asthma 

complication type: uncomplicated   Qualified Code(s): J45.20 - Mild 

intermittent asthma, uncomplicated   





- AMA


Did Patient Leave Against Medical Advice: Yes

## 2020-01-06 NOTE — PN
[facility] Progress Note


Note: 





pt refused to stay and complete his detox; pt was advised to stay and prevent 

relapse, seizures, DT, OD and or death loss; pt chose to sign out AMA.

## 2020-01-06 NOTE — PN
S CIWA





- CIWA Score


Nausea/Vomitin-No Nausea/No Vomiting


Muscle Tremors: 3


Anxiety: 2


Agitation: 2


Paroxysmal Sweats: 2


Orientation: 0-Oriented


Tacttile Disturbances: 0-None


Auditory Disturbances: 0-None


Visual Disturbances: 0-None


Headache: 0-None Present


CIWA-Ar Total Score: 9





[facility] Progress Note (SOAP)


Subjective: 





sweat


body aches


irritable


agitation


Objective: 





20 11:19


 Vital Signs











Temperature  97.4 F L  20 09:26


 


Pulse Rate  74   20 09:26


 


Respiratory Rate  18   20 09:26


 


Blood Pressure  100/63   20 09:26


 


O2 Sat by Pulse Oximetry (%)      








 Laboratory Tests











  20





  08:15 08:15


 


WBC  3.0 L 


 


RBC  4.58 


 


Hgb  13.1 


 


Hct  39.0 


 


MCV  85.1 


 


MCH  28.5 


 


MCHC  33.5 


 


RDW  14.8 


 


Plt Count  121 L D 


 


MPV  10.4 


 


Sodium   138


 


Potassium   3.6


 


Chloride   106


 


Carbon Dioxide   28


 


Anion Gap   4 L


 


BUN   5.1 L


 


Creatinine   0.8


 


Est GFR (CKD-EPI)AfAm   119.04


 


Est GFR (CKD-EPI)NonAf   102.71


 


Random Glucose   84


 


Calcium   8.3 L


 


Total Bilirubin   0.5


 


AST   33


 


ALT   26


 


Alkaline Phosphatase   145 H


 


Total Protein   7.0


 


Albumin   2.6 L








labs noted


aaox3


ambulating


no acute distress


Assessment: 





20 11:19


withdrawals


Plan: 





continue detox

## 2020-02-08 ENCOUNTER — HOSPITAL ENCOUNTER (INPATIENT)
Dept: HOSPITAL 74 - YASAS | Age: 55
LOS: 4 days | Discharge: OTHER INSTITUTION | End: 2020-02-12
Attending: ALLERGY & IMMUNOLOGY | Admitting: ALLERGY & IMMUNOLOGY
Payer: COMMERCIAL

## 2020-02-08 VITALS — BODY MASS INDEX: 18.7 KG/M2

## 2020-02-08 DIAGNOSIS — F17.213: ICD-10-CM

## 2020-02-08 DIAGNOSIS — Z21: ICD-10-CM

## 2020-02-08 DIAGNOSIS — J45.20: ICD-10-CM

## 2020-02-08 DIAGNOSIS — Z85.048: ICD-10-CM

## 2020-02-08 DIAGNOSIS — F14.20: ICD-10-CM

## 2020-02-08 DIAGNOSIS — Z91.14: ICD-10-CM

## 2020-02-08 DIAGNOSIS — D72.819: ICD-10-CM

## 2020-02-08 DIAGNOSIS — F10.220: ICD-10-CM

## 2020-02-08 DIAGNOSIS — F10.230: Primary | ICD-10-CM

## 2020-02-08 PROCEDURE — HZ2ZZZZ DETOXIFICATION SERVICES FOR SUBSTANCE ABUSE TREATMENT: ICD-10-PCS | Performed by: ALLERGY & IMMUNOLOGY

## 2020-02-08 RX ADMIN — Medication PRN MG: at 22:15

## 2020-02-08 RX ADMIN — Medication SCH MG: at 22:16

## 2020-02-08 RX ADMIN — NICOTINE SCH MG: 7 PATCH TRANSDERMAL at 18:06

## 2020-02-08 NOTE — HP
CIWA Score


Nausea/Vomitin


Muscle Tremors: 2


Anxiety: 2


Agitation: 1-Slight > Activity


Paroxysmal Sweats: 2


Orientation: 0-Oriented


Tacttile Disturbances: 2-Mild Itch/Numbness/Burn


Auditory Disturbances: 0-None


Visual Disturbances: 0-None


Headache: 2-Mild


CIWA-Ar Total Score: 13





- Admission Criteria


[facility] Guidelines: Admission for Medically Managed Detox: 


Requires at least one of the followin. CIWA greater than 12


2. Seizures within the past 24 hours


3. Delirium tremens within the past 24 hours


4. Hallucinations within the past 24 hours


5. Acute intervention needed for co  occurring medical disorder


6. Acute intervention needed for co  occurring psychiatric disorder


7. Severe withdrawal that cannot be handled at a lower level of care (continued


    vomiting, continued diarrhea, abnormal vital signs) requiring intravenous


    medication and/or fluids


8. Pregnancy





Patient presents the following: CIWA greater than 12


Admission Criteria Met: Admission criteria met





Admitting History and Physical





- Past Medical History


CNS: Yes: Syncope


Pulmonary: Yes: Asthma


Gastrointestinal: Yes: Cancer (anal), Other (anal cancer surgery 0n 19)


Infectious Disease: Yes: HIV (since  non compliance no medication for 4 

months)


Psych: Yes: Addictions





- Smoking History


Smoking history: Current every day smoker


Have you smoked in the past 12 months: Yes


Aproximately how many cigarettes per day: 20





- Alcohol/Substance Use


Hx Alcohol Use: Yes


History of Substance Use: reports: Cocaine, Marijuana





- Social History


ADL: Support Services


Occupation: unemployed


History of Recent Travel: No





Admission [facility]





- [facility]


Chief Complaint: 


I need help


Allergies/Adverse Reactions: 


 Allergies











Allergy/AdvReac Type Severity Reaction Status Date / Time


 


No Known Allergies Allergy   Verified 20 14:35











History of Present Illness: 





54 year old male with alcohol dependence presents for detox. His last admission 

was from 2020 to 2020, he signed out AMA





- Ebola screening


Have you traveled outside of the country in the last 21 days: No


Have you had contact with anyone from an Ebola affected area: No


Have you been sick,other than usual withdrawal symptoms: No


Do you have a fever: No





- Review of Systems


Constitutional: Chills, Loss of Appetite, Changes in sleep, Unintentional Wgt. 

Loss


EENT: reports: No Symptoms Reported


Respiratory: reports: Cough


Cardiac: reports: No Symptoms Reported


GI: reports: Nausea, Poor Fluid Intake, Vomiting, Abdominal cramping


: reports: No Symptoms Reported


Musculoskeletal: reports: Back Pain, Joint Pain, Muscle Pain


Integumentary: reports: Dryness, Sweating


Neuro: reports: Headache, Numbness, Tremors


Endocrine: reports: No Symptoms Reported


Hematology: reports: No Symptoms Reported


Psychiatric: reports: Anxious


Other Systems: Reviewed and Negative





Patient History





- Patient Medical History


Hx Anemia: No


Hx Asthma: Yes (on albuterol inhale)


Hx Chronic Obstructive Pulmonary Disease (COPD): No


Hx Cancer: Yes (of anus had surgery)


Hx Cardiac Disorders: No


Hx Congestive Heart Failure: No


Hx Hypertension: No


Hx Hypercholesterolemia: No


Hx Pacemaker: No


HX Cerebrovascular Accident: No


Hx Seizures: No


Hx Dementia: No


Hx Diabetes: No


Hx Gastrointestinal Disorders: No


Hx Liver Disease: No


Hx Genitourinary Disorders: No


Hx Sexually Transmitted Disorders: No


Hx Renal Disease (ESRD): No


Hx Thyroid Disease: No


Hx Human Immunodeficiency Virus (HIV): Yes (since )


Hx Hepatitis C: No


Hx Depression: No


Hx Suicide Attempt: No


Hx Bipolar Disorder: No


Hx Schizophrenia: No





- Patient Surgical History


Past Surgical History: Yes


Hx Neurologic Surgery: No


Hx Cataract Extraction: No


Hx Cardiac Surgery: No


Hx Lung Surgery: No


Hx Breast Surgery: No


Hx Breast Biopsy: No


Hx Abdominal Surgery: No


Hx Appendectomy: No


Hx Cholecystectomy: No


Hx Genitourinary Surgery: No


Hx  Section: No


Hx Orthopedic Surgery: No


Other Surgical History: removal of anal cancer 0n 19 and 19 at 

[facility]


Anesthesia Reaction: No





- PPD History


Previous Implant?: No


Documented Results: Negative w/o proof


Implanted On Prior [facility] Admission?: No


Date: 08/10/19


Results: tb gold neg


PPD to be Administered?: Yes





- Smoking Cessation


Smoking history: Current every day smoker


Have you smoked in the past 12 months: Yes


Aproximately how many cigarettes per day: 20


Hx Chewing Tobacco Use: No


Initiated information on smoking cessation: Yes


'Breaking Loose' booklet given: 20





- Substances abused


  ** Alcohol


Substance route: Oral


Frequency: Daily


Amount used: 1-2pints of vodka


Age of first use: 17


Date of last use: 20





Admission Physical Exam [facility]





- Vital Signs


Vital Signs: 


 Vital Signs - 24 hr











  20





  14:41


 


Temperature 97.5 F L


 


Pulse Rate 88


 


Respiratory 16





Rate 


 


Blood Pressure 102/66














- Physical


General Appearance: Yes: No Apparent Distress, Cachetic


HEENTM: Yes: Hearing grossly Normal, Normocephalic, Normal Voice, Pharynx Normal

, Other (missing upper dentures)


Respiratory: Yes: Chest Non-Tender, Lungs Clear, Normal Breath Sounds, No 

Respiratory Distress, No Accessory Muscle Use


Neck: Yes: No masses,lesions,Nodules, Supple


Breast: Yes: Breast Exam Deferred


Cardiology: Yes: Regular Rhythm, Regular Rate, S1, S2


Abdominal: Yes: Normal Bowel Sounds, Flat, Soft


Genitourinary: Yes: Within Normal Limits


Back: Yes: Normal Inspection


Musculoskeletal: Yes: full range of Motion


Extremities: Yes: Non-Tender, Tremors


Neurological: Yes: CNs II-XII NML intact, Fully Oriented, Alert, Normal Mood/

Affect, Normal Response


Integumentary: Yes: Normal Color, Moist


Lymphatic: Yes: Within Normal Limits





- Diagnostic


(1) Alcohol dependence with uncomplicated intoxication


Current Visit: Yes   Status: Acute   





(2) Asthma


Current Visit: No   Status: Chronic   


Qualifiers: 


   Asthma severity: mild   Asthma persistence: intermittent   Asthma 

complication type: uncomplicated   Qualified Code(s): J45.20 - Mild 

intermittent asthma, uncomplicated   





(3) Cocaine dependence


Current Visit: Yes   Status: Chronic   


Qualifiers: 


   Substance use status: uncomplicated   Qualified Code(s): F14.20 - Cocaine 

dependence, uncomplicated   





(4) Nicotine dependence


Current Visit: Yes   Status: Chronic   


Qualifiers: 


   Nicotine product type: cigarettes   Substance use status: uncomplicated   

Qualified Code(s): F17.210 - Nicotine dependence, cigarettes, uncomplicated   





(5) HIV antibody positive


Current Visit: Yes   Status: Chronic   





Cleared for Admission [facility]





- Detox or Rehab


[facility] Level of Care: Medically Managed


Detox Regimen/Protocol: Librium


Claeared for Rehab Admission: No





Breathalyzer





- Breathalyzer


Breathalyzer: 0





Urine Drug Screen





- Test Device


Lot number: [id number]


Expiration date: 21





- Control


Is test valid?: Yes





- Results


Drug screen NEGATIVE: No


Urine drug screen results: SHONNA-Cocaine, BZO-Benzodiazepines





Inpatient Rehab Admission





- Rehab Decision to Admit


Inpatient rehab admission?: No

## 2020-02-09 LAB
ALBUMIN SERPL-MCNC: 2.2 G/DL (ref 3.4–5)
ALP SERPL-CCNC: 110 U/L (ref 45–117)
ALT SERPL-CCNC: 24 U/L (ref 13–61)
ANION GAP SERPL CALC-SCNC: 6 MMOL/L (ref 8–16)
AST SERPL-CCNC: 24 U/L (ref 15–37)
BILIRUB SERPL-MCNC: 0.3 MG/DL (ref 0.2–1)
BUN SERPL-MCNC: 11.4 MG/DL (ref 7–18)
CALCIUM SERPL-MCNC: 8.4 MG/DL (ref 8.5–10.1)
CHLORIDE SERPL-SCNC: 105 MMOL/L (ref 98–107)
CO2 SERPL-SCNC: 26 MMOL/L (ref 21–32)
CREAT SERPL-MCNC: 0.8 MG/DL (ref 0.55–1.3)
DEPRECATED RDW RBC AUTO: 13.7 % (ref 11.9–15.9)
GLUCOSE SERPL-MCNC: 96 MG/DL (ref 74–106)
HCT VFR BLD CALC: 31 % (ref 35.4–49)
HGB BLD-MCNC: 10.3 GM/DL (ref 11.7–16.9)
MCH RBC QN AUTO: 28.6 PG (ref 25.7–33.7)
MCHC RBC AUTO-ENTMCNC: 33.4 G/DL (ref 32–35.9)
MCV RBC: 85.6 FL (ref 80–96)
PLATELET # BLD AUTO: 511 K/MM3 (ref 134–434)
PMV BLD: 8.9 FL (ref 7.5–11.1)
POTASSIUM SERPLBLD-SCNC: 4.3 MMOL/L (ref 3.5–5.1)
PROT SERPL-MCNC: 7.4 G/DL (ref 6.4–8.2)
RBC # BLD AUTO: 3.62 M/MM3 (ref 4–5.6)
SODIUM SERPL-SCNC: 137 MMOL/L (ref 136–145)
WBC # BLD AUTO: 2.1 K/MM3 (ref 4–10)

## 2020-02-09 RX ADMIN — NICOTINE SCH MG: 7 PATCH TRANSDERMAL at 10:30

## 2020-02-09 RX ADMIN — Medication SCH TAB: at 10:30

## 2020-02-09 RX ADMIN — Medication SCH MG: at 22:09

## 2020-02-09 RX ADMIN — BICTEGRAVIR SODIUM, EMTRICITABINE, AND TENOFOVIR ALAFENAMIDE FUMARATE SCH EACH: 50; 200; 25 TABLET ORAL at 14:30

## 2020-02-09 NOTE — PN
[facility] CIWA





- CIWA Score


Nausea/Vomitin-Mild Nausea/No Vomiting


Muscle Tremors: 3


Anxiety: 3


Agitation: 2


Paroxysmal Sweats: 1-Minimal Palms Moist


Orientation: 0-Oriented


Tacttile Disturbances: 0-None


Auditory Disturbances: 0-None


Visual Disturbances: 1-Very Mild Sensitivity


Headache: 1-Very Mild


CIWA-Ar Total Score: 12





[facility] Progress Note (SOAP)


Subjective: 





54 years old male admitted on 20 for alcohol withdrawal sx management 

treating with librium detox regiment 


sitting on the edge of the bed eating breakfast alert speech clearly patient 

states that she does not have Biktavey with him upon admission 


biktavey ordered as pharmacy verifies patient's own medication and continue 

biktavey while in detox





discontinue original biktavey order begin gilberto's availability in biktavey 

while in detox


Objective: 





20 12:16


 Vital Signs











Temperature  96.9 F L  20 08:56


 


Pulse Rate  67   20 08:56


 


Respiratory Rate  18   20 08:56


 


Blood Pressure  106/68   20 08:56


 


O2 Sat by Pulse Oximetry (%)      








 Laboratory Last Values











WBC  2.1 K/mm3 (4.0-10.0)  L  20  07:20    


 


RBC  3.62 M/mm3 (4.00-5.60)  L  20  07:20    


 


Hgb  10.3 GM/dL (11.7-16.9)  L  20  07:20    


 


Hct  31.0 % (35.4-49)  L D 20  07:20    


 


MCV  85.6 fl (80-96)   20  07:20    


 


MCH  28.6 pg (25.7-33.7)   20  07:20    


 


MCHC  33.4 g/dl (32.0-35.9)   20  07:20    


 


RDW  13.7 % (11.9-15.9)   20  07:20    


 


Plt Count  511 K/MM3 (134-434)  H D 20  07:20    


 


MPV  8.9 fl (7.5-11.1)  D 20  07:20    


 


Sodium  137 mmol/L (136-145)   20  07:20    


 


Potassium  4.3 mmol/L (3.5-5.1)   20  07:20    


 


Chloride  105 mmol/L ()   20  07:20    


 


Carbon Dioxide  26 mmol/L (21-32)   20  07:20    


 


Anion Gap  6 MMOL/L (8-16)  L  20  07:20    


 


BUN  11.4 mg/dL (7-18)   20  07:20    


 


Creatinine  0.8 mg/dL (0.55-1.3)   20  07:20    


 


Est GFR (CKD-EPI)AfAm  119.04   20  07:20    


 


Est GFR (CKD-EPI)NonAf  102.71   20  07:20    


 


Random Glucose  96 mg/dL ()   20  07:20    


 


Calcium  8.4 mg/dL (8.5-10.1)  L  20  07:20    


 


Total Bilirubin  0.3 mg/dL (0.2-1)   20  07:20    


 


AST  24 U/L (15-37)   20  07:20    


 


ALT  24 U/L (13-61)   20  07:20    


 


Alkaline Phosphatase  110 U/L ()   20  07:20    


 


Total Protein  7.4 g/dl (6.4-8.2)   20  07:20    


 


Albumin  2.2 g/dl (3.4-5.0)  L  20  07:20    


 


RPR Titer  Nonreactive  (NONREACTIVE)   20  07:20    








lab noted


low wbc with ARV


Assessment: 





20 12:16


alcohol withdrawal 


Plan: 





librium regiment

## 2020-02-10 RX ADMIN — Medication SCH MG: at 22:03

## 2020-02-10 RX ADMIN — BICTEGRAVIR SODIUM, EMTRICITABINE, AND TENOFOVIR ALAFENAMIDE FUMARATE SCH EACH: 50; 200; 25 TABLET ORAL at 10:34

## 2020-02-10 RX ADMIN — Medication PRN MG: at 22:04

## 2020-02-10 RX ADMIN — Medication SCH TAB: at 10:34

## 2020-02-10 RX ADMIN — NICOTINE SCH: 7 PATCH TRANSDERMAL at 10:35

## 2020-02-10 NOTE — PN
[facility] CIWA





- CIWA Score


Nausea/Vomitin-Mild Nausea/No Vomiting


Muscle Tremors: 2


Anxiety: 3


Agitation: 0-Normal Activity


Paroxysmal Sweats: 2


Orientation: 0-Oriented


Tacttile Disturbances: 0-None


Auditory Disturbances: 0-None


Visual Disturbances: 1-Very Mild Sensitivity


Headache: 1-Very Mild


CIWA-Ar Total Score: 10





[facility] Progress Note (SOAP)


Subjective: 





54 years old male admitted on 20 for alcohol withdrawal sx management 

treating with librium detox regiment


feeling ok today ate breakfast in day room social with peers 


attend behavior and psychosocial therapies groups and meetings 


patient determents to maintain sober 


Objective: 





02/10/20 13:31


 Vital Signs











Temperature  97.3 F L  02/10/20 08:50


 


Pulse Rate  73   02/10/20 08:50


 


Respiratory Rate  18   02/10/20 08:50


 


Blood Pressure  95/59 L  02/10/20 08:50


 


O2 Sat by Pulse Oximetry (%)      








 Laboratory Last Values











WBC  2.1 K/mm3 (4.0-10.0)  L  20  07:20    


 


RBC  3.62 M/mm3 (4.00-5.60)  L  20  07:20    


 


Hgb  10.3 GM/dL (11.7-16.9)  L  20  07:20    


 


Hct  31.0 % (35.4-49)  L D 20  07:20    


 


MCV  85.6 fl (80-96)   20  07:20    


 


MCH  28.6 pg (25.7-33.7)   20  07:20    


 


MCHC  33.4 g/dl (32.0-35.9)   20  07:20    


 


RDW  13.7 % (11.9-15.9)   20  07:20    


 


Plt Count  511 K/MM3 (134-434)  H D 20  07:20    


 


MPV  8.9 fl (7.5-11.1)  D 20  07:20    


 


Sodium  137 mmol/L (136-145)   20  07:20    


 


Potassium  4.3 mmol/L (3.5-5.1)   20  07:20    


 


Chloride  105 mmol/L ()   02/09/20  07:20    


 


Carbon Dioxide  26 mmol/L (21-32)   20  07:20    


 


Anion Gap  6 MMOL/L (8-16)  L  20  07:20    


 


BUN  11.4 mg/dL (7-18)   20  07:20    


 


Creatinine  0.8 mg/dL (0.55-1.3)   20  07:20    


 


Est GFR (CKD-EPI)AfAm  119.04   20  07:20    


 


Est GFR (CKD-EPI)NonAf  102.71   20  07:20    


 


Random Glucose  96 mg/dL ()   20  07:20    


 


Calcium  8.4 mg/dL (8.5-10.1)  L  20  07:20    


 


Total Bilirubin  0.3 mg/dL (0.2-1)   20  07:20    


 


AST  24 U/L (15-37)   20  07:20    


 


ALT  24 U/L (13-61)   20  07:20    


 


Alkaline Phosphatase  110 U/L ()   20  07:20    


 


Total Protein  7.4 g/dl (6.4-8.2)   20  07:20    


 


Albumin  2.2 g/dl (3.4-5.0)  L  20  07:20    


 


RPR Titer  Nonreactive  (NONREACTIVE)   20  07:20    











02/10/20 13:31


long history of hiv taking ARV with low wbc


patient is asymptomatic 


Assessment: 





02/10/20 13:31


alcohol withdrawal 


Plan: 





librium regiment

## 2020-02-11 RX ADMIN — Medication SCH MG: at 22:21

## 2020-02-11 RX ADMIN — Medication SCH TAB: at 10:12

## 2020-02-11 RX ADMIN — NICOTINE SCH: 7 PATCH TRANSDERMAL at 10:12

## 2020-02-11 RX ADMIN — Medication PRN MG: at 22:21

## 2020-02-11 RX ADMIN — BICTEGRAVIR SODIUM, EMTRICITABINE, AND TENOFOVIR ALAFENAMIDE FUMARATE SCH EACH: 50; 200; 25 TABLET ORAL at 10:12

## 2020-02-11 NOTE — PN
S CIWA





- CIWA Score


Nausea/Vomitin-No Nausea/No Vomiting


Muscle Tremors: 1-None Visible, but Felt


Anxiety: 3


Agitation: 0-Normal Activity


Paroxysmal Sweats: 1-Minimal Palms Moist


Orientation: 0-Oriented


Tacttile Disturbances: 0-None


Auditory Disturbances: 0-None


Visual Disturbances: 1-Very Mild Sensitivity


Headache: 0-None Present


CIWA-Ar Total Score: 6





[facility] Progress Note (SOAP)


Subjective: 





54 years old male admitted on 20 for alcohol withdrawal sx management 

treating with librium detox regiment 


feeling better today less tremor slept better at night 


Objective: 





20 15:38


 Vital Signs











Temperature  97 F L  20 12:37


 


Pulse Rate  64   20 12:37


 


Respiratory Rate  18   20 12:37


 


Blood Pressure  100/66   20 12:37


 


O2 Sat by Pulse Oximetry (%)      








 Laboratory Last Values











WBC  2.1 K/mm3 (4.0-10.0)  L  20  07:20    


 


RBC  3.62 M/mm3 (4.00-5.60)  L  20  07:20    


 


Hgb  10.3 GM/dL (11.7-16.9)  L  20  07:20    


 


Hct  31.0 % (35.4-49)  L D 20  07:20    


 


MCV  85.6 fl (80-96)   20  07:20    


 


MCH  28.6 pg (25.7-33.7)   20  07:20    


 


MCHC  33.4 g/dl (32.0-35.9)   20  07:20    


 


RDW  13.7 % (11.9-15.9)   20  07:20    


 


Plt Count  511 K/MM3 (134-434)  H D 20  07:20    


 


MPV  8.9 fl (7.5-11.1)  D 20  07:20    


 


Sodium  137 mmol/L (136-145)   20  07:20    


 


Potassium  4.3 mmol/L (3.5-5.1)   20  07:20    


 


Chloride  105 mmol/L ()   20  07:20    


 


Carbon Dioxide  26 mmol/L (21-32)   20  07:20    


 


Anion Gap  6 MMOL/L (8-16)  L  20  07:20    


 


BUN  11.4 mg/dL (7-18)   20  07:20    


 


Creatinine  0.8 mg/dL (0.55-1.3)   20  07:20    


 


Est GFR (CKD-EPI)AfAm  119.04   20  07:20    


 


Est GFR (CKD-EPI)NonAf  102.71   20  07:20    


 


Random Glucose  96 mg/dL ()   20  07:20    


 


Calcium  8.4 mg/dL (8.5-10.1)  L  20  07:20    


 


Total Bilirubin  0.3 mg/dL (0.2-1)   20  07:20    


 


AST  24 U/L (15-37)   20  07:20    


 


ALT  24 U/L (13-61)   20  07:20    


 


Alkaline Phosphatase  110 U/L ()   20  07:20    


 


Total Protein  7.4 g/dl (6.4-8.2)   20  07:20    


 


Albumin  2.2 g/dl (3.4-5.0)  L  20  07:20    


 


RPR Titer  Nonreactive  (NONREACTIVE)   20  07:20    








lab noted


20 15:39


long history of hiv treated with hrv with low wbc


Assessment: 





20 15:41


alcohol withdrawal 


Plan: 





librium regiment

## 2020-02-12 ENCOUNTER — HOSPITAL ENCOUNTER (INPATIENT)
Dept: HOSPITAL 74 - YASAS | Age: 55
LOS: 27 days | Discharge: HOME | DRG: 772 | End: 2020-03-10
Attending: ALLERGY & IMMUNOLOGY | Admitting: ALLERGY & IMMUNOLOGY
Payer: COMMERCIAL

## 2020-02-12 VITALS — HEART RATE: 81 BPM

## 2020-02-12 VITALS — DIASTOLIC BLOOD PRESSURE: 60 MMHG | SYSTOLIC BLOOD PRESSURE: 95 MMHG | TEMPERATURE: 98.1 F

## 2020-02-12 DIAGNOSIS — F12.20: ICD-10-CM

## 2020-02-12 DIAGNOSIS — J45.909: ICD-10-CM

## 2020-02-12 DIAGNOSIS — Z85.048: ICD-10-CM

## 2020-02-12 DIAGNOSIS — F14.20: ICD-10-CM

## 2020-02-12 DIAGNOSIS — F19.282: ICD-10-CM

## 2020-02-12 DIAGNOSIS — Z21: ICD-10-CM

## 2020-02-12 DIAGNOSIS — Z59.0: ICD-10-CM

## 2020-02-12 DIAGNOSIS — F17.210: ICD-10-CM

## 2020-02-12 DIAGNOSIS — Z56.0: ICD-10-CM

## 2020-02-12 DIAGNOSIS — F10.20: Primary | ICD-10-CM

## 2020-02-12 PROCEDURE — HZ42ZZZ GROUP COUNSELING FOR SUBSTANCE ABUSE TREATMENT, COGNITIVE-BEHAVIORAL: ICD-10-PCS | Performed by: ALLERGY & IMMUNOLOGY

## 2020-02-12 RX ADMIN — Medication SCH MG: at 21:03

## 2020-02-12 RX ADMIN — NICOTINE SCH: 7 PATCH TRANSDERMAL at 09:47

## 2020-02-12 RX ADMIN — BICTEGRAVIR SODIUM, EMTRICITABINE, AND TENOFOVIR ALAFENAMIDE FUMARATE SCH EACH: 50; 200; 25 TABLET ORAL at 09:46

## 2020-02-12 RX ADMIN — CYPROHEPTADINE HYDROCHLORIDE SCH MG: 4 TABLET ORAL at 17:03

## 2020-02-12 RX ADMIN — ALBUTEROL SULFATE SCH: 90 AEROSOL, METERED RESPIRATORY (INHALATION) at 18:35

## 2020-02-12 RX ADMIN — Medication SCH TAB: at 09:46

## 2020-02-12 RX ADMIN — ALBUTEROL SULFATE SCH: 90 AEROSOL, METERED RESPIRATORY (INHALATION) at 21:46

## 2020-02-12 RX ADMIN — ALBUTEROL SULFATE SCH PUFF: 90 AEROSOL, METERED RESPIRATORY (INHALATION) at 16:03

## 2020-02-12 SDOH — ECONOMIC STABILITY - INCOME SECURITY: UNEMPLOYMENT, UNSPECIFIED: Z56.0

## 2020-02-12 SDOH — ECONOMIC STABILITY - HOUSING INSECURITY: HOMELESSNESS: Z59.0

## 2020-02-12 NOTE — PN
[facility] Progress Note (SOAP)


Subjective: 


Patient admitted to [facility], transferred from [facility]. PMHx: 54 year old male 

with alcohol dependence presents for detox. His last admission was from 1/5/ 2020 to 1/6/2020, he signed out AMA because he needed to get his HIV 

medication. 








Objective: 


hysical


General Appearance: No Apparent Distress, Cachetic


HEENTM: Normocephalic, missing upper dentures


Respiratory:  Lungs Clear, 


Neck: , Supple


Cardiology:  S1, S2


Abdominal:  +Bowel Sounds, Flat, Soft


Musculoskeletal:full range of Motion


Neurological: CNs II-XII NML intact, 





02/12/20 14:20





02/12/20 14:22


 Vital Signs











 Period  Temp  Pulse  Resp  BP Sys/Hyde  Pulse Ox


 


 Last 24 Hr  97.4 F  84  18  91/52  











Assessment: 


Here for Substance abuse treatment, used ETOH, cocaine, cannabis


Undernourished


HIV infection


02/12/20 14:22





02/12/20 14:23





02/12/20 14:24





Plan: 


Maintain safety


Rehab treatment for SEN


Will start Ensure and periactin


Continue HIV treatment.

## 2020-02-12 NOTE — HP
[facility] MD Rehab Assess/Revision





- Admission History


Admitted to Rehab from: [facility]


Date of Admission to Rehab: 2/12/20





- Vital signs


Vital Signs: 


 Vital Signs











 Period  Temp  Pulse  Resp  BP Sys/Hyde  Pulse Ox


 


 Last 24 Hr  97.4 F  84  18  91/52  














- Findings


Detox History & Physical reviewed: Yes


Concur with findings: Yes





Inpatient Rehab Admission





- Rehab Decision to Admit


Inpatient rehab admission?: Yes





- Initial Determination


Are CD services needed?: Yes


Free of communicable disease: Yes


Not in need of hospitalization: Yes





- Rehab Admission Criteria


Previous failed treatment: Yes


Poor recovery environment: Yes


Comorbidities: No


Lacks judgement: No


Patient is meeting Inpatient Rehab admission criteria:: Yes

## 2020-02-12 NOTE — HP
[facility] MD Rehab Assess/Revision





- Admission History


[facility]hab [facility]


Date of Admission to Rehab: 02/12/20





- Vital signs


Vital Signs: 


 Vital Signs











 Period  Temp  Pulse  Resp  BP Sys/Hyde  Pulse Ox


 


 Last 24 Hr  97.4 F  84  18  91/52  














- Findings


Detox History & Physical reviewed: Yes


Concur with findings: Yes


Comments/Additional Findings: transferred from detox to rehab admission as per 

protocol





Inpatient Rehab Admission





- Rehab Decision to Admit


Inpatient rehab admission?: Yes





- Initial Determination


Are CD services needed?: Yes


Free of communicable disease: Yes


Not in need of hospitalization: Yes





- Rehab Admission Criteria


Previous failed treatment: Yes


Poor recovery environment: Yes


Comorbidities: Yes


Lacks judgement: Yes


Patient is meeting Inpatient Rehab admission criteria:: Yes

## 2020-02-12 NOTE — DS
[facility] Detox Discharge Summary


Admission Date: 


20





Discharge Date: 20





- History


Present History: Alcohol Dependence


Additional Comments: 





54 years old male admitted on 20 for alcohol withdrawal sx management 

treated with librium detox regiment


Mr Barkley has completed the librium regiment and tolerated well 


doing well through out the detox 


alert oriented x 3 


respiratory clear lungs bilaterally on auscultation


extremities full range of motion


skin warm and dry


Pertinent Past History: 





time for discharge: 25 minutes





- Physical Exam Results


Vital Signs: 


 Vital Signs











Temperature  98.1 F   20 12:46


 


Pulse Rate  81   20 12:46


 


Respiratory Rate  16   20 12:46


 


Blood Pressure  95/60   20 12:46


 


O2 Sat by Pulse Oximetry (%)      











Pertinent Admission Physical Exam Findings: 





alcohol withdrawal 


 Laboratory Last Values











WBC  2.1 K/mm3 (4.0-10.0)  L  20  07:20    


 


RBC  3.62 M/mm3 (4.00-5.60)  L  20  07:20    


 


Hgb  10.3 GM/dL (11.7-16.9)  L  20  07:20    


 


Hct  31.0 % (35.4-49)  L D 20  07:20    


 


MCV  85.6 fl (80-96)   20  07:20    


 


MCH  28.6 pg (25.7-33.7)   20  07:20    


 


MCHC  33.4 g/dl (32.0-35.9)   20  07:20    


 


RDW  13.7 % (11.9-15.9)   20  07:20    


 


Plt Count  511 K/MM3 (134-434)  H D 20  07:20    


 


MPV  8.9 fl (7.5-11.1)  D 20  07:20    


 


Sodium  137 mmol/L (136-145)   20  07:20    


 


Potassium  4.3 mmol/L (3.5-5.1)   20  07:20    


 


Chloride  105 mmol/L ()   20  07:20    


 


Carbon Dioxide  26 mmol/L (21-32)   20  07:20    


 


Anion Gap  6 MMOL/L (8-16)  L  20  07:20    


 


BUN  11.4 mg/dL (7-18)   20  07:20    


 


Creatinine  0.8 mg/dL (0.55-1.3)   20  07:20    


 


Est GFR (CKD-EPI)AfAm  119.04   20  07:20    


 


Est GFR (CKD-EPI)NonAf  102.71   20  07:20    


 


Random Glucose  96 mg/dL ()   20  07:20    


 


Calcium  8.4 mg/dL (8.5-10.1)  L  20  07:20    


 


Total Bilirubin  0.3 mg/dL (0.2-1)   20  07:20    


 


AST  24 U/L (15-37)   20  07:20    


 


ALT  24 U/L (13-61)   20  07:20    


 


Alkaline Phosphatase  110 U/L ()   20  07:20    


 


Total Protein  7.4 g/dl (6.4-8.2)   20  07:20    


 


Albumin  2.2 g/dl (3.4-5.0)  L  20  07:20    


 


RPR Titer  Nonreactive  (NONREACTIVE)   20  07:20    








lab noted


long history of hiv on ARV with low wbc





- Treatment


Hospital Course: Detox Protocol Followed, Detoxed Safely, Responded well, 

Discharged Condition Good, Rehab Referral Accepted


Patient [facility] Accepted [facility] Ref[facility]





- Medication


Discharge Medications: 


Ambulatory Orders





Bictegrav/Emtricit/Tenofov Ala [Biktarvy -25 mg Tablet] 1 each PO DAILY  


Albuterol Sulfate Inhaler - [Ventolin HFA Inhaler -] 2 inh PO Q4H 20 











- Diagnosis


(1) Alcohol dependence with uncomplicated intoxication


Status: Acute   





(2) HIV antibody positive


Status: Chronic   





(3) Nicotine dependence


Status: Acute   


Qualifiers: 


   Nicotine product type: cigarettes   Substance use status: in withdrawal   

Qualified Code(s): F17.213 - Nicotine dependence, cigarettes, with withdrawal   





(4) Asthma


Status: Chronic   


Qualifiers: 


   Asthma severity: mild   Asthma persistence: intermittent   Asthma 

complication type: uncomplicated   Qualified Code(s): J45.20 - Mild 

intermittent asthma, uncomplicated   





- AMA


Did Patient Leave Against Medical Advice: No





CIWA Score





- CIWA Score


Nausea/Vomitin-No Nausea/No Vomiting


Muscle Tremors: 1-None Visible, but Felt


Anxiety: 2


Agitation: 0-Normal Activity


Paroxysmal Sweats: No Perspiration


Orientation: 0-Oriented


Tacttile Disturbances: 0-None


Auditory Disturbances: 0-None


Visual Disturbances: 0-None


Headache: 0-None Present


CIWA-Ar Total Score: 3

## 2020-02-13 RX ADMIN — ALBUTEROL SULFATE SCH: 90 AEROSOL, METERED RESPIRATORY (INHALATION) at 15:14

## 2020-02-13 RX ADMIN — ALBUTEROL SULFATE SCH: 90 AEROSOL, METERED RESPIRATORY (INHALATION) at 04:00

## 2020-02-13 RX ADMIN — ALBUTEROL SULFATE SCH PUFF: 90 AEROSOL, METERED RESPIRATORY (INHALATION) at 18:00

## 2020-02-13 RX ADMIN — ALBUTEROL SULFATE SCH PUFF: 90 AEROSOL, METERED RESPIRATORY (INHALATION) at 06:04

## 2020-02-13 RX ADMIN — Medication SCH TAB: at 10:15

## 2020-02-13 RX ADMIN — BICTEGRAVIR SODIUM, EMTRICITABINE, AND TENOFOVIR ALAFENAMIDE FUMARATE SCH EACH: 50; 200; 25 TABLET ORAL at 10:15

## 2020-02-13 RX ADMIN — SUVOREXANT PRN MG: 10 TABLET, FILM COATED ORAL at 21:18

## 2020-02-13 RX ADMIN — Medication SCH MG: at 21:17

## 2020-02-13 RX ADMIN — NICOTINE SCH MG: 7 PATCH TRANSDERMAL at 10:15

## 2020-02-13 RX ADMIN — CYPROHEPTADINE HYDROCHLORIDE SCH MG: 4 TABLET ORAL at 10:17

## 2020-02-13 RX ADMIN — CYPROHEPTADINE HYDROCHLORIDE SCH MG: 4 TABLET ORAL at 17:06

## 2020-02-13 RX ADMIN — ALBUTEROL SULFATE SCH PUFF: 90 AEROSOL, METERED RESPIRATORY (INHALATION) at 10:16

## 2020-02-13 RX ADMIN — CYPROHEPTADINE HYDROCHLORIDE SCH MG: 4 TABLET ORAL at 06:04

## 2020-02-13 RX ADMIN — ALBUTEROL SULFATE SCH: 90 AEROSOL, METERED RESPIRATORY (INHALATION) at 23:32

## 2020-02-14 RX ADMIN — ALBUTEROL SULFATE SCH PUFF: 90 AEROSOL, METERED RESPIRATORY (INHALATION) at 19:01

## 2020-02-14 RX ADMIN — Medication SCH TAB: at 09:48

## 2020-02-14 RX ADMIN — ALBUTEROL SULFATE SCH PUFF: 90 AEROSOL, METERED RESPIRATORY (INHALATION) at 06:25

## 2020-02-14 RX ADMIN — CYPROHEPTADINE HYDROCHLORIDE SCH MG: 4 TABLET ORAL at 14:33

## 2020-02-14 RX ADMIN — CYPROHEPTADINE HYDROCHLORIDE SCH MG: 4 TABLET ORAL at 16:30

## 2020-02-14 RX ADMIN — ALBUTEROL SULFATE SCH: 90 AEROSOL, METERED RESPIRATORY (INHALATION) at 04:04

## 2020-02-14 RX ADMIN — Medication SCH MG: at 21:27

## 2020-02-14 RX ADMIN — ALBUTEROL SULFATE SCH PUFF: 90 AEROSOL, METERED RESPIRATORY (INHALATION) at 21:44

## 2020-02-14 RX ADMIN — NICOTINE SCH MG: 7 PATCH TRANSDERMAL at 09:48

## 2020-02-14 RX ADMIN — MAGNESIUM HYDROXIDE PRN ML: 400 SUSPENSION ORAL at 15:38

## 2020-02-14 RX ADMIN — CYPROHEPTADINE HYDROCHLORIDE SCH MG: 4 TABLET ORAL at 06:25

## 2020-02-14 RX ADMIN — SUVOREXANT PRN MG: 10 TABLET, FILM COATED ORAL at 21:28

## 2020-02-14 RX ADMIN — BICTEGRAVIR SODIUM, EMTRICITABINE, AND TENOFOVIR ALAFENAMIDE FUMARATE SCH EACH: 50; 200; 25 TABLET ORAL at 09:48

## 2020-02-14 RX ADMIN — ALBUTEROL SULFATE SCH PUFF: 90 AEROSOL, METERED RESPIRATORY (INHALATION) at 09:49

## 2020-02-14 RX ADMIN — ALBUTEROL SULFATE SCH: 90 AEROSOL, METERED RESPIRATORY (INHALATION) at 14:33

## 2020-02-15 RX ADMIN — ALBUTEROL SULFATE SCH: 90 AEROSOL, METERED RESPIRATORY (INHALATION) at 02:30

## 2020-02-15 RX ADMIN — ALBUTEROL SULFATE SCH: 90 AEROSOL, METERED RESPIRATORY (INHALATION) at 15:23

## 2020-02-15 RX ADMIN — MAGNESIUM HYDROXIDE PRN ML: 400 SUSPENSION ORAL at 10:18

## 2020-02-15 RX ADMIN — ALBUTEROL SULFATE SCH: 90 AEROSOL, METERED RESPIRATORY (INHALATION) at 22:02

## 2020-02-15 RX ADMIN — SUVOREXANT PRN MG: 10 TABLET, FILM COATED ORAL at 21:18

## 2020-02-15 RX ADMIN — ALBUTEROL SULFATE SCH: 90 AEROSOL, METERED RESPIRATORY (INHALATION) at 17:56

## 2020-02-15 RX ADMIN — BICTEGRAVIR SODIUM, EMTRICITABINE, AND TENOFOVIR ALAFENAMIDE FUMARATE SCH EACH: 50; 200; 25 TABLET ORAL at 10:15

## 2020-02-15 RX ADMIN — CYPROHEPTADINE HYDROCHLORIDE SCH MG: 4 TABLET ORAL at 06:10

## 2020-02-15 RX ADMIN — Medication SCH TAB: at 10:14

## 2020-02-15 RX ADMIN — Medication SCH MG: at 21:18

## 2020-02-15 RX ADMIN — ALBUTEROL SULFATE SCH: 90 AEROSOL, METERED RESPIRATORY (INHALATION) at 10:16

## 2020-02-15 RX ADMIN — CYPROHEPTADINE HYDROCHLORIDE SCH MG: 4 TABLET ORAL at 17:14

## 2020-02-15 RX ADMIN — ALBUTEROL SULFATE SCH PUFF: 90 AEROSOL, METERED RESPIRATORY (INHALATION) at 06:53

## 2020-02-15 RX ADMIN — NICOTINE SCH MG: 7 PATCH TRANSDERMAL at 10:14

## 2020-02-15 RX ADMIN — CYPROHEPTADINE HYDROCHLORIDE SCH MG: 4 TABLET ORAL at 11:55

## 2020-02-16 RX ADMIN — ALBUTEROL SULFATE SCH PUFF: 90 AEROSOL, METERED RESPIRATORY (INHALATION) at 22:08

## 2020-02-16 RX ADMIN — BICTEGRAVIR SODIUM, EMTRICITABINE, AND TENOFOVIR ALAFENAMIDE FUMARATE SCH EACH: 50; 200; 25 TABLET ORAL at 09:45

## 2020-02-16 RX ADMIN — CYPROHEPTADINE HYDROCHLORIDE SCH MG: 4 TABLET ORAL at 16:59

## 2020-02-16 RX ADMIN — ALBUTEROL SULFATE SCH PUFF: 90 AEROSOL, METERED RESPIRATORY (INHALATION) at 18:38

## 2020-02-16 RX ADMIN — SUVOREXANT PRN MG: 10 TABLET, FILM COATED ORAL at 21:19

## 2020-02-16 RX ADMIN — ALBUTEROL SULFATE SCH PUFF: 90 AEROSOL, METERED RESPIRATORY (INHALATION) at 06:09

## 2020-02-16 RX ADMIN — Medication SCH MG: at 21:18

## 2020-02-16 RX ADMIN — ALBUTEROL SULFATE SCH: 90 AEROSOL, METERED RESPIRATORY (INHALATION) at 14:40

## 2020-02-16 RX ADMIN — CYPROHEPTADINE HYDROCHLORIDE SCH MG: 4 TABLET ORAL at 11:55

## 2020-02-16 RX ADMIN — Medication SCH TAB: at 09:44

## 2020-02-16 RX ADMIN — NICOTINE SCH MG: 7 PATCH TRANSDERMAL at 09:44

## 2020-02-16 RX ADMIN — Medication PRN APPLIC: at 09:45

## 2020-02-16 RX ADMIN — CYPROHEPTADINE HYDROCHLORIDE SCH MG: 4 TABLET ORAL at 06:09

## 2020-02-16 RX ADMIN — ALBUTEROL SULFATE SCH: 90 AEROSOL, METERED RESPIRATORY (INHALATION) at 10:20

## 2020-02-16 RX ADMIN — ALBUTEROL SULFATE SCH: 90 AEROSOL, METERED RESPIRATORY (INHALATION) at 02:30

## 2020-02-16 NOTE — PN
[facility] Progress Note


Note: 





Psychiatric nurse practitoner note:


Belsomra 10mg PRN renewed X3 days. Verbal consent given.

## 2020-02-17 RX ADMIN — CYPROHEPTADINE HYDROCHLORIDE SCH MG: 4 TABLET ORAL at 16:30

## 2020-02-17 RX ADMIN — CYPROHEPTADINE HYDROCHLORIDE SCH: 4 TABLET ORAL at 11:49

## 2020-02-17 RX ADMIN — Medication SCH TAB: at 10:04

## 2020-02-17 RX ADMIN — SUVOREXANT PRN MG: 10 TABLET, FILM COATED ORAL at 21:07

## 2020-02-17 RX ADMIN — ALBUTEROL SULFATE SCH: 90 AEROSOL, METERED RESPIRATORY (INHALATION) at 03:32

## 2020-02-17 RX ADMIN — ALBUTEROL SULFATE SCH PUFF: 90 AEROSOL, METERED RESPIRATORY (INHALATION) at 06:09

## 2020-02-17 RX ADMIN — NICOTINE SCH MG: 7 PATCH TRANSDERMAL at 10:04

## 2020-02-17 RX ADMIN — BICTEGRAVIR SODIUM, EMTRICITABINE, AND TENOFOVIR ALAFENAMIDE FUMARATE SCH EACH: 50; 200; 25 TABLET ORAL at 10:04

## 2020-02-17 RX ADMIN — Medication SCH MG: at 21:06

## 2020-02-17 RX ADMIN — CYPROHEPTADINE HYDROCHLORIDE SCH MG: 4 TABLET ORAL at 06:09

## 2020-02-18 RX ADMIN — CYPROHEPTADINE HYDROCHLORIDE SCH MG: 4 TABLET ORAL at 11:55

## 2020-02-18 RX ADMIN — CYPROHEPTADINE HYDROCHLORIDE SCH MG: 4 TABLET ORAL at 06:11

## 2020-02-18 RX ADMIN — Medication SCH MG: at 21:24

## 2020-02-18 RX ADMIN — Medication SCH TAB: at 09:46

## 2020-02-18 RX ADMIN — NICOTINE SCH MG: 7 PATCH TRANSDERMAL at 09:46

## 2020-02-18 RX ADMIN — BICTEGRAVIR SODIUM, EMTRICITABINE, AND TENOFOVIR ALAFENAMIDE FUMARATE SCH EACH: 50; 200; 25 TABLET ORAL at 09:46

## 2020-02-18 RX ADMIN — CYPROHEPTADINE HYDROCHLORIDE SCH MG: 4 TABLET ORAL at 16:37

## 2020-02-18 RX ADMIN — SUVOREXANT PRN MG: 10 TABLET, FILM COATED ORAL at 21:25

## 2020-02-18 RX ADMIN — ALBUTEROL SULFATE PRN INH: 90 AEROSOL, METERED RESPIRATORY (INHALATION) at 09:47

## 2020-02-19 RX ADMIN — NICOTINE SCH MG: 7 PATCH TRANSDERMAL at 09:29

## 2020-02-19 RX ADMIN — CYPROHEPTADINE HYDROCHLORIDE SCH MG: 4 TABLET ORAL at 06:18

## 2020-02-19 RX ADMIN — Medication SCH MG: at 21:09

## 2020-02-19 RX ADMIN — CYPROHEPTADINE HYDROCHLORIDE SCH MG: 4 TABLET ORAL at 11:57

## 2020-02-19 RX ADMIN — CYPROHEPTADINE HYDROCHLORIDE SCH MG: 4 TABLET ORAL at 16:35

## 2020-02-19 RX ADMIN — Medication SCH TAB: at 09:29

## 2020-02-19 RX ADMIN — BICTEGRAVIR SODIUM, EMTRICITABINE, AND TENOFOVIR ALAFENAMIDE FUMARATE SCH EACH: 50; 200; 25 TABLET ORAL at 09:29

## 2020-02-19 RX ADMIN — SUVOREXANT PRN MG: 10 TABLET, FILM COATED ORAL at 21:11

## 2020-02-20 RX ADMIN — NICOTINE SCH MG: 7 PATCH TRANSDERMAL at 09:28

## 2020-02-20 RX ADMIN — SUVOREXANT PRN MG: 10 TABLET, FILM COATED ORAL at 21:51

## 2020-02-20 RX ADMIN — BICTEGRAVIR SODIUM, EMTRICITABINE, AND TENOFOVIR ALAFENAMIDE FUMARATE SCH EACH: 50; 200; 25 TABLET ORAL at 09:28

## 2020-02-20 RX ADMIN — CYPROHEPTADINE HYDROCHLORIDE SCH MG: 4 TABLET ORAL at 17:48

## 2020-02-20 RX ADMIN — Medication SCH MG: at 21:49

## 2020-02-20 RX ADMIN — ALBUTEROL SULFATE PRN INH: 90 AEROSOL, METERED RESPIRATORY (INHALATION) at 09:29

## 2020-02-20 RX ADMIN — Medication SCH TAB: at 09:28

## 2020-02-20 RX ADMIN — CYPROHEPTADINE HYDROCHLORIDE SCH MG: 4 TABLET ORAL at 12:06

## 2020-02-20 RX ADMIN — CYPROHEPTADINE HYDROCHLORIDE SCH MG: 4 TABLET ORAL at 06:32

## 2020-02-21 RX ADMIN — NICOTINE SCH MG: 7 PATCH TRANSDERMAL at 09:53

## 2020-02-21 RX ADMIN — Medication SCH MG: at 21:09

## 2020-02-21 RX ADMIN — CYPROHEPTADINE HYDROCHLORIDE SCH MG: 4 TABLET ORAL at 06:24

## 2020-02-21 RX ADMIN — CYPROHEPTADINE HYDROCHLORIDE SCH MG: 4 TABLET ORAL at 17:12

## 2020-02-21 RX ADMIN — CYPROHEPTADINE HYDROCHLORIDE SCH MG: 4 TABLET ORAL at 12:00

## 2020-02-21 RX ADMIN — Medication SCH TAB: at 09:53

## 2020-02-21 RX ADMIN — SUVOREXANT PRN MG: 10 TABLET, FILM COATED ORAL at 21:09

## 2020-02-21 RX ADMIN — BICTEGRAVIR SODIUM, EMTRICITABINE, AND TENOFOVIR ALAFENAMIDE FUMARATE SCH EACH: 50; 200; 25 TABLET ORAL at 09:53

## 2020-02-22 RX ADMIN — BICTEGRAVIR SODIUM, EMTRICITABINE, AND TENOFOVIR ALAFENAMIDE FUMARATE SCH EACH: 50; 200; 25 TABLET ORAL at 10:08

## 2020-02-22 RX ADMIN — CYPROHEPTADINE HYDROCHLORIDE SCH MG: 4 TABLET ORAL at 12:01

## 2020-02-22 RX ADMIN — CYPROHEPTADINE HYDROCHLORIDE SCH MG: 4 TABLET ORAL at 06:35

## 2020-02-22 RX ADMIN — CYPROHEPTADINE HYDROCHLORIDE SCH MG: 4 TABLET ORAL at 16:35

## 2020-02-22 RX ADMIN — Medication SCH TAB: at 10:08

## 2020-02-22 RX ADMIN — SUVOREXANT PRN MG: 10 TABLET, FILM COATED ORAL at 21:29

## 2020-02-22 RX ADMIN — Medication SCH MG: at 21:28

## 2020-02-22 RX ADMIN — NICOTINE SCH MG: 7 PATCH TRANSDERMAL at 10:07

## 2020-02-22 NOTE — PN
[facility] Progress Note


Note: 





Psychiatry


Attending's note :


MUMTAZ Viveros called.


Issue : renewal of belsomra.


Chart reviewed. Medication verified. 


Effective. No report of adverse events. 


Belsomra 10 mg po hs prn. Ordered.

## 2020-02-23 RX ADMIN — SUVOREXANT PRN MG: 10 TABLET, FILM COATED ORAL at 21:07

## 2020-02-23 RX ADMIN — CYPROHEPTADINE HYDROCHLORIDE SCH MG: 4 TABLET ORAL at 16:27

## 2020-02-23 RX ADMIN — Medication SCH TAB: at 09:51

## 2020-02-23 RX ADMIN — BICTEGRAVIR SODIUM, EMTRICITABINE, AND TENOFOVIR ALAFENAMIDE FUMARATE SCH EACH: 50; 200; 25 TABLET ORAL at 09:51

## 2020-02-23 RX ADMIN — CYPROHEPTADINE HYDROCHLORIDE SCH MG: 4 TABLET ORAL at 11:56

## 2020-02-23 RX ADMIN — CYPROHEPTADINE HYDROCHLORIDE SCH MG: 4 TABLET ORAL at 06:06

## 2020-02-23 RX ADMIN — NICOTINE SCH MG: 7 PATCH TRANSDERMAL at 09:51

## 2020-02-23 RX ADMIN — Medication SCH MG: at 21:05

## 2020-02-24 RX ADMIN — BICTEGRAVIR SODIUM, EMTRICITABINE, AND TENOFOVIR ALAFENAMIDE FUMARATE SCH EACH: 50; 200; 25 TABLET ORAL at 09:57

## 2020-02-24 RX ADMIN — SUVOREXANT PRN MG: 10 TABLET, FILM COATED ORAL at 22:17

## 2020-02-24 RX ADMIN — POLYVINYL ALCOHOL PRN DROP: 14 SOLUTION/ DROPS OPHTHALMIC at 19:54

## 2020-02-24 RX ADMIN — POLYVINYL ALCOHOL PRN DROP: 14 SOLUTION/ DROPS OPHTHALMIC at 15:40

## 2020-02-24 RX ADMIN — CYPROHEPTADINE HYDROCHLORIDE SCH MG: 4 TABLET ORAL at 12:09

## 2020-02-24 RX ADMIN — Medication SCH MG: at 22:15

## 2020-02-24 RX ADMIN — NICOTINE SCH MG: 7 PATCH TRANSDERMAL at 09:57

## 2020-02-24 RX ADMIN — CYPROHEPTADINE HYDROCHLORIDE SCH MG: 4 TABLET ORAL at 06:26

## 2020-02-24 RX ADMIN — Medication SCH TAB: at 09:57

## 2020-02-24 RX ADMIN — CYPROHEPTADINE HYDROCHLORIDE SCH: 4 TABLET ORAL at 19:45

## 2020-02-25 RX ADMIN — BICTEGRAVIR SODIUM, EMTRICITABINE, AND TENOFOVIR ALAFENAMIDE FUMARATE SCH EACH: 50; 200; 25 TABLET ORAL at 09:55

## 2020-02-25 RX ADMIN — CYPROHEPTADINE HYDROCHLORIDE SCH MG: 4 TABLET ORAL at 16:32

## 2020-02-25 RX ADMIN — SUVOREXANT PRN MG: 10 TABLET, FILM COATED ORAL at 21:13

## 2020-02-25 RX ADMIN — Medication SCH TAB: at 09:55

## 2020-02-25 RX ADMIN — NICOTINE SCH MG: 7 PATCH TRANSDERMAL at 09:55

## 2020-02-25 RX ADMIN — POLYVINYL ALCOHOL PRN DROP: 14 SOLUTION/ DROPS OPHTHALMIC at 09:58

## 2020-02-25 RX ADMIN — CYPROHEPTADINE HYDROCHLORIDE SCH MG: 4 TABLET ORAL at 06:15

## 2020-02-25 RX ADMIN — CYPROHEPTADINE HYDROCHLORIDE SCH MG: 4 TABLET ORAL at 11:08

## 2020-02-25 RX ADMIN — Medication SCH MG: at 21:13

## 2020-02-25 RX ADMIN — POLYVINYL ALCOHOL PRN DROP: 14 SOLUTION/ DROPS OPHTHALMIC at 16:33

## 2020-02-26 RX ADMIN — SUVOREXANT PRN MG: 10 TABLET, FILM COATED ORAL at 21:31

## 2020-02-26 RX ADMIN — NICOTINE SCH MG: 7 PATCH TRANSDERMAL at 10:54

## 2020-02-26 RX ADMIN — Medication SCH MG: at 21:31

## 2020-02-26 RX ADMIN — CYPROHEPTADINE HYDROCHLORIDE SCH MG: 4 TABLET ORAL at 06:21

## 2020-02-26 RX ADMIN — Medication SCH TAB: at 10:54

## 2020-02-26 RX ADMIN — POLYVINYL ALCOHOL PRN DROP: 14 SOLUTION/ DROPS OPHTHALMIC at 06:20

## 2020-02-26 RX ADMIN — BICTEGRAVIR SODIUM, EMTRICITABINE, AND TENOFOVIR ALAFENAMIDE FUMARATE SCH EACH: 50; 200; 25 TABLET ORAL at 10:54

## 2020-02-26 RX ADMIN — CYPROHEPTADINE HYDROCHLORIDE SCH MG: 4 TABLET ORAL at 10:54

## 2020-02-26 RX ADMIN — CYPROHEPTADINE HYDROCHLORIDE SCH MG: 4 TABLET ORAL at 16:38

## 2020-02-27 RX ADMIN — Medication SCH MG: at 21:11

## 2020-02-27 RX ADMIN — POLYVINYL ALCOHOL PRN DROP: 14 SOLUTION/ DROPS OPHTHALMIC at 21:12

## 2020-02-27 RX ADMIN — CYPROHEPTADINE HYDROCHLORIDE SCH MG: 4 TABLET ORAL at 10:31

## 2020-02-27 RX ADMIN — SUVOREXANT PRN MG: 10 TABLET, FILM COATED ORAL at 21:12

## 2020-02-27 RX ADMIN — CYPROHEPTADINE HYDROCHLORIDE SCH MG: 4 TABLET ORAL at 06:31

## 2020-02-27 RX ADMIN — BICTEGRAVIR SODIUM, EMTRICITABINE, AND TENOFOVIR ALAFENAMIDE FUMARATE SCH EACH: 50; 200; 25 TABLET ORAL at 10:05

## 2020-02-27 RX ADMIN — CYPROHEPTADINE HYDROCHLORIDE SCH MG: 4 TABLET ORAL at 16:25

## 2020-02-27 RX ADMIN — NICOTINE SCH: 7 PATCH TRANSDERMAL at 10:05

## 2020-02-27 RX ADMIN — Medication SCH TAB: at 10:05

## 2020-02-28 RX ADMIN — POLYVINYL ALCOHOL PRN DROP: 14 SOLUTION/ DROPS OPHTHALMIC at 06:46

## 2020-02-28 RX ADMIN — CYPROHEPTADINE HYDROCHLORIDE SCH MG: 4 TABLET ORAL at 06:45

## 2020-02-28 RX ADMIN — CYPROHEPTADINE HYDROCHLORIDE SCH MG: 4 TABLET ORAL at 16:10

## 2020-02-28 RX ADMIN — CYPROHEPTADINE HYDROCHLORIDE SCH MG: 4 TABLET ORAL at 11:28

## 2020-02-28 RX ADMIN — BICTEGRAVIR SODIUM, EMTRICITABINE, AND TENOFOVIR ALAFENAMIDE FUMARATE SCH EACH: 50; 200; 25 TABLET ORAL at 09:35

## 2020-02-28 RX ADMIN — Medication SCH TAB: at 09:35

## 2020-02-28 RX ADMIN — NICOTINE SCH MG: 7 PATCH TRANSDERMAL at 09:35

## 2020-02-28 RX ADMIN — Medication SCH MG: at 21:24

## 2020-02-28 RX ADMIN — SUVOREXANT PRN MG: 10 TABLET, FILM COATED ORAL at 21:24

## 2020-02-29 RX ADMIN — SUVOREXANT PRN MG: 10 TABLET, FILM COATED ORAL at 22:25

## 2020-02-29 RX ADMIN — CYPROHEPTADINE HYDROCHLORIDE SCH MG: 4 TABLET ORAL at 06:18

## 2020-02-29 RX ADMIN — POLYVINYL ALCOHOL PRN DROP: 14 SOLUTION/ DROPS OPHTHALMIC at 22:25

## 2020-02-29 RX ADMIN — Medication SCH MG: at 22:23

## 2020-02-29 RX ADMIN — CYPROHEPTADINE HYDROCHLORIDE SCH MG: 4 TABLET ORAL at 11:17

## 2020-02-29 RX ADMIN — Medication PRN APPLIC: at 22:26

## 2020-02-29 RX ADMIN — Medication SCH TAB: at 10:03

## 2020-02-29 RX ADMIN — CYPROHEPTADINE HYDROCHLORIDE SCH MG: 4 TABLET ORAL at 17:01

## 2020-02-29 RX ADMIN — BICTEGRAVIR SODIUM, EMTRICITABINE, AND TENOFOVIR ALAFENAMIDE FUMARATE SCH EACH: 50; 200; 25 TABLET ORAL at 10:03

## 2020-02-29 RX ADMIN — POLYVINYL ALCOHOL PRN DROP: 14 SOLUTION/ DROPS OPHTHALMIC at 17:02

## 2020-02-29 RX ADMIN — POLYVINYL ALCOHOL PRN DROP: 14 SOLUTION/ DROPS OPHTHALMIC at 06:17

## 2020-02-29 RX ADMIN — NICOTINE SCH MG: 7 PATCH TRANSDERMAL at 10:03

## 2020-03-01 RX ADMIN — CYPROHEPTADINE HYDROCHLORIDE SCH: 4 TABLET ORAL at 17:18

## 2020-03-01 RX ADMIN — BICTEGRAVIR SODIUM, EMTRICITABINE, AND TENOFOVIR ALAFENAMIDE FUMARATE SCH EACH: 50; 200; 25 TABLET ORAL at 10:47

## 2020-03-01 RX ADMIN — CYPROHEPTADINE HYDROCHLORIDE SCH MG: 4 TABLET ORAL at 06:05

## 2020-03-01 RX ADMIN — NICOTINE SCH: 7 PATCH TRANSDERMAL at 10:47

## 2020-03-01 RX ADMIN — Medication SCH MG: at 22:10

## 2020-03-01 RX ADMIN — POLYVINYL ALCOHOL PRN DROP: 14 SOLUTION/ DROPS OPHTHALMIC at 06:05

## 2020-03-01 RX ADMIN — Medication SCH TAB: at 10:47

## 2020-03-01 RX ADMIN — SUVOREXANT PRN MG: 10 TABLET, FILM COATED ORAL at 22:10

## 2020-03-01 RX ADMIN — CYPROHEPTADINE HYDROCHLORIDE SCH MG: 4 TABLET ORAL at 10:47

## 2020-03-02 RX ADMIN — SUVOREXANT PRN MG: 10 TABLET, FILM COATED ORAL at 21:16

## 2020-03-02 RX ADMIN — Medication SCH MG: at 21:16

## 2020-03-02 RX ADMIN — NICOTINE SCH: 7 PATCH TRANSDERMAL at 09:55

## 2020-03-02 RX ADMIN — Medication SCH TAB: at 09:55

## 2020-03-02 RX ADMIN — POLYVINYL ALCOHOL PRN DROP: 14 SOLUTION/ DROPS OPHTHALMIC at 09:55

## 2020-03-02 RX ADMIN — CYPROHEPTADINE HYDROCHLORIDE SCH MG: 4 TABLET ORAL at 16:49

## 2020-03-02 RX ADMIN — BICTEGRAVIR SODIUM, EMTRICITABINE, AND TENOFOVIR ALAFENAMIDE FUMARATE SCH EACH: 50; 200; 25 TABLET ORAL at 09:55

## 2020-03-02 RX ADMIN — CYPROHEPTADINE HYDROCHLORIDE SCH MG: 4 TABLET ORAL at 06:46

## 2020-03-02 RX ADMIN — CYPROHEPTADINE HYDROCHLORIDE SCH MG: 4 TABLET ORAL at 11:47

## 2020-03-03 RX ADMIN — BICTEGRAVIR SODIUM, EMTRICITABINE, AND TENOFOVIR ALAFENAMIDE FUMARATE SCH EACH: 50; 200; 25 TABLET ORAL at 10:20

## 2020-03-03 RX ADMIN — NICOTINE SCH: 7 PATCH TRANSDERMAL at 10:20

## 2020-03-03 RX ADMIN — Medication SCH TAB: at 10:20

## 2020-03-03 RX ADMIN — CYPROHEPTADINE HYDROCHLORIDE SCH MG: 4 TABLET ORAL at 17:05

## 2020-03-03 RX ADMIN — Medication SCH MG: at 21:18

## 2020-03-03 RX ADMIN — Medication PRN APPLIC: at 15:38

## 2020-03-03 RX ADMIN — SUVOREXANT PRN MG: 10 TABLET, FILM COATED ORAL at 21:18

## 2020-03-03 RX ADMIN — POLYVINYL ALCOHOL PRN DROP: 14 SOLUTION/ DROPS OPHTHALMIC at 06:25

## 2020-03-03 RX ADMIN — CYPROHEPTADINE HYDROCHLORIDE SCH MG: 4 TABLET ORAL at 10:20

## 2020-03-03 RX ADMIN — CYPROHEPTADINE HYDROCHLORIDE SCH MG: 4 TABLET ORAL at 06:26

## 2020-03-04 RX ADMIN — POLYVINYL ALCOHOL PRN DROP: 14 SOLUTION/ DROPS OPHTHALMIC at 21:18

## 2020-03-04 RX ADMIN — CYPROHEPTADINE HYDROCHLORIDE SCH MG: 4 TABLET ORAL at 11:50

## 2020-03-04 RX ADMIN — BICTEGRAVIR SODIUM, EMTRICITABINE, AND TENOFOVIR ALAFENAMIDE FUMARATE SCH EACH: 50; 200; 25 TABLET ORAL at 09:50

## 2020-03-04 RX ADMIN — Medication SCH TAB: at 09:50

## 2020-03-04 RX ADMIN — NICOTINE SCH: 7 PATCH TRANSDERMAL at 09:50

## 2020-03-04 RX ADMIN — CYPROHEPTADINE HYDROCHLORIDE SCH MG: 4 TABLET ORAL at 06:02

## 2020-03-04 RX ADMIN — SUVOREXANT PRN MG: 10 TABLET, FILM COATED ORAL at 21:18

## 2020-03-04 RX ADMIN — POLYVINYL ALCOHOL PRN DROP: 14 SOLUTION/ DROPS OPHTHALMIC at 06:01

## 2020-03-04 RX ADMIN — Medication SCH MG: at 21:18

## 2020-03-04 RX ADMIN — CYPROHEPTADINE HYDROCHLORIDE SCH MG: 4 TABLET ORAL at 16:40

## 2020-03-05 RX ADMIN — CYPROHEPTADINE HYDROCHLORIDE SCH MG: 4 TABLET ORAL at 07:18

## 2020-03-05 RX ADMIN — POLYVINYL ALCOHOL PRN DROP: 14 SOLUTION/ DROPS OPHTHALMIC at 05:55

## 2020-03-05 RX ADMIN — POLYVINYL ALCOHOL PRN DROP: 14 SOLUTION/ DROPS OPHTHALMIC at 21:42

## 2020-03-05 RX ADMIN — Medication SCH MG: at 21:42

## 2020-03-05 RX ADMIN — CYPROHEPTADINE HYDROCHLORIDE SCH MG: 4 TABLET ORAL at 10:14

## 2020-03-05 RX ADMIN — CYPROHEPTADINE HYDROCHLORIDE SCH MG: 4 TABLET ORAL at 17:30

## 2020-03-05 RX ADMIN — NICOTINE SCH: 7 PATCH TRANSDERMAL at 10:14

## 2020-03-05 RX ADMIN — Medication SCH TAB: at 10:14

## 2020-03-05 RX ADMIN — SUVOREXANT PRN MG: 10 TABLET, FILM COATED ORAL at 21:42

## 2020-03-05 RX ADMIN — BICTEGRAVIR SODIUM, EMTRICITABINE, AND TENOFOVIR ALAFENAMIDE FUMARATE SCH EACH: 50; 200; 25 TABLET ORAL at 10:14

## 2020-03-06 RX ADMIN — Medication SCH MG: at 21:22

## 2020-03-06 RX ADMIN — ALBUTEROL SULFATE PRN INH: 90 AEROSOL, METERED RESPIRATORY (INHALATION) at 10:01

## 2020-03-06 RX ADMIN — NICOTINE SCH: 7 PATCH TRANSDERMAL at 10:00

## 2020-03-06 RX ADMIN — POLYVINYL ALCOHOL PRN DROP: 14 SOLUTION/ DROPS OPHTHALMIC at 06:19

## 2020-03-06 RX ADMIN — POLYVINYL ALCOHOL PRN DROP: 14 SOLUTION/ DROPS OPHTHALMIC at 21:23

## 2020-03-06 RX ADMIN — SUVOREXANT PRN MG: 10 TABLET, FILM COATED ORAL at 21:22

## 2020-03-06 RX ADMIN — CYPROHEPTADINE HYDROCHLORIDE SCH MG: 4 TABLET ORAL at 17:05

## 2020-03-06 RX ADMIN — CYPROHEPTADINE HYDROCHLORIDE SCH MG: 4 TABLET ORAL at 06:19

## 2020-03-06 RX ADMIN — Medication SCH TAB: at 09:59

## 2020-03-06 RX ADMIN — BICTEGRAVIR SODIUM, EMTRICITABINE, AND TENOFOVIR ALAFENAMIDE FUMARATE SCH EACH: 50; 200; 25 TABLET ORAL at 10:01

## 2020-03-06 RX ADMIN — CYPROHEPTADINE HYDROCHLORIDE SCH MG: 4 TABLET ORAL at 11:18

## 2020-03-07 RX ADMIN — SUVOREXANT PRN MG: 10 TABLET, FILM COATED ORAL at 21:16

## 2020-03-07 RX ADMIN — POLYVINYL ALCOHOL PRN DROP: 14 SOLUTION/ DROPS OPHTHALMIC at 10:41

## 2020-03-07 RX ADMIN — CYPROHEPTADINE HYDROCHLORIDE SCH MG: 4 TABLET ORAL at 12:21

## 2020-03-07 RX ADMIN — BICTEGRAVIR SODIUM, EMTRICITABINE, AND TENOFOVIR ALAFENAMIDE FUMARATE SCH EACH: 50; 200; 25 TABLET ORAL at 10:40

## 2020-03-07 RX ADMIN — Medication SCH MG: at 21:15

## 2020-03-07 RX ADMIN — POLYVINYL ALCOHOL PRN DROP: 14 SOLUTION/ DROPS OPHTHALMIC at 16:41

## 2020-03-07 RX ADMIN — NICOTINE SCH: 7 PATCH TRANSDERMAL at 10:40

## 2020-03-07 RX ADMIN — CYPROHEPTADINE HYDROCHLORIDE SCH MG: 4 TABLET ORAL at 06:31

## 2020-03-07 RX ADMIN — Medication SCH TAB: at 10:39

## 2020-03-07 RX ADMIN — CYPROHEPTADINE HYDROCHLORIDE SCH MG: 4 TABLET ORAL at 16:41

## 2020-03-07 RX ADMIN — POLYVINYL ALCOHOL PRN DROP: 14 SOLUTION/ DROPS OPHTHALMIC at 21:16

## 2020-03-08 RX ADMIN — CYPROHEPTADINE HYDROCHLORIDE SCH MG: 4 TABLET ORAL at 16:49

## 2020-03-08 RX ADMIN — CYPROHEPTADINE HYDROCHLORIDE SCH MG: 4 TABLET ORAL at 10:00

## 2020-03-08 RX ADMIN — POLYVINYL ALCOHOL PRN DROP: 14 SOLUTION/ DROPS OPHTHALMIC at 07:17

## 2020-03-08 RX ADMIN — BICTEGRAVIR SODIUM, EMTRICITABINE, AND TENOFOVIR ALAFENAMIDE FUMARATE SCH EACH: 50; 200; 25 TABLET ORAL at 09:59

## 2020-03-08 RX ADMIN — CYPROHEPTADINE HYDROCHLORIDE SCH MG: 4 TABLET ORAL at 06:29

## 2020-03-08 RX ADMIN — SUVOREXANT PRN MG: 10 TABLET, FILM COATED ORAL at 21:23

## 2020-03-08 RX ADMIN — Medication SCH TAB: at 09:58

## 2020-03-08 RX ADMIN — Medication SCH MG: at 21:22

## 2020-03-08 RX ADMIN — POLYVINYL ALCOHOL PRN DROP: 14 SOLUTION/ DROPS OPHTHALMIC at 10:00

## 2020-03-08 RX ADMIN — NICOTINE SCH: 7 PATCH TRANSDERMAL at 09:58

## 2020-03-09 RX ADMIN — Medication SCH TAB: at 09:46

## 2020-03-09 RX ADMIN — CYPROHEPTADINE HYDROCHLORIDE SCH MG: 4 TABLET ORAL at 16:48

## 2020-03-09 RX ADMIN — BICTEGRAVIR SODIUM, EMTRICITABINE, AND TENOFOVIR ALAFENAMIDE FUMARATE SCH EACH: 50; 200; 25 TABLET ORAL at 09:46

## 2020-03-09 RX ADMIN — ALBUTEROL SULFATE PRN INH: 90 AEROSOL, METERED RESPIRATORY (INHALATION) at 09:46

## 2020-03-09 RX ADMIN — Medication SCH MG: at 21:20

## 2020-03-09 RX ADMIN — POLYVINYL ALCOHOL PRN DROP: 14 SOLUTION/ DROPS OPHTHALMIC at 06:31

## 2020-03-09 RX ADMIN — CYPROHEPTADINE HYDROCHLORIDE SCH MG: 4 TABLET ORAL at 11:12

## 2020-03-09 RX ADMIN — HYDROXYZINE PAMOATE PRN MG: 25 CAPSULE ORAL at 21:20

## 2020-03-09 RX ADMIN — POLYVINYL ALCOHOL PRN DROP: 14 SOLUTION/ DROPS OPHTHALMIC at 21:22

## 2020-03-09 RX ADMIN — NICOTINE SCH: 7 PATCH TRANSDERMAL at 09:47

## 2020-03-09 RX ADMIN — CYPROHEPTADINE HYDROCHLORIDE SCH MG: 4 TABLET ORAL at 06:32

## 2020-03-10 VITALS — TEMPERATURE: 97.6 F | SYSTOLIC BLOOD PRESSURE: 107 MMHG | DIASTOLIC BLOOD PRESSURE: 67 MMHG | HEART RATE: 73 BPM

## 2020-03-10 RX ADMIN — CYPROHEPTADINE HYDROCHLORIDE SCH MG: 4 TABLET ORAL at 06:05

## 2020-03-10 RX ADMIN — POLYVINYL ALCOHOL PRN DROP: 14 SOLUTION/ DROPS OPHTHALMIC at 05:51

## 2020-03-10 RX ADMIN — HYDROXYZINE PAMOATE PRN MG: 25 CAPSULE ORAL at 03:23

## 2020-08-07 ENCOUNTER — HOSPITAL ENCOUNTER (INPATIENT)
Dept: HOSPITAL 74 - YASAS | Age: 55
LOS: 5 days | Discharge: OTHER INSTITUTION | End: 2020-08-12
Attending: ALLERGY & IMMUNOLOGY | Admitting: ALLERGY & IMMUNOLOGY
Payer: COMMERCIAL

## 2020-08-07 VITALS — BODY MASS INDEX: 25.2 KG/M2

## 2020-08-07 DIAGNOSIS — J45.20: ICD-10-CM

## 2020-08-07 DIAGNOSIS — Z85.048: ICD-10-CM

## 2020-08-07 DIAGNOSIS — B37.2: ICD-10-CM

## 2020-08-07 DIAGNOSIS — F14.20: ICD-10-CM

## 2020-08-07 DIAGNOSIS — Z91.14: ICD-10-CM

## 2020-08-07 DIAGNOSIS — F17.213: ICD-10-CM

## 2020-08-07 DIAGNOSIS — F10.230: Primary | ICD-10-CM

## 2020-08-07 DIAGNOSIS — Z21: ICD-10-CM

## 2020-08-07 DIAGNOSIS — F12.20: ICD-10-CM

## 2020-08-07 LAB
ALBUMIN SERPL-MCNC: 3.4 G/DL (ref 3.4–5)
ALP SERPL-CCNC: 139 U/L (ref 45–117)
ALT SERPL-CCNC: 24 U/L (ref 13–61)
ANION GAP SERPL CALC-SCNC: 9 MMOL/L (ref 8–16)
AST SERPL-CCNC: 31 U/L (ref 15–37)
BILIRUB SERPL-MCNC: 0.5 MG/DL (ref 0.2–1)
BUN SERPL-MCNC: 7.3 MG/DL (ref 7–18)
CALCIUM SERPL-MCNC: 8.8 MG/DL (ref 8.5–10.1)
CHLORIDE SERPL-SCNC: 106 MMOL/L (ref 98–107)
CO2 SERPL-SCNC: 22 MMOL/L (ref 21–32)
CREAT SERPL-MCNC: 1 MG/DL (ref 0.55–1.3)
DEPRECATED RDW RBC AUTO: 16.4 % (ref 11.9–15.9)
GLUCOSE SERPL-MCNC: 120 MG/DL (ref 74–106)
HCT VFR BLD CALC: 41 % (ref 35.4–49)
HGB BLD-MCNC: 13.5 GM/DL (ref 11.7–16.9)
MCH RBC QN AUTO: 28.2 PG (ref 25.7–33.7)
MCHC RBC AUTO-ENTMCNC: 32.8 G/DL (ref 32–35.9)
MCV RBC: 86 FL (ref 80–96)
PLATELET # BLD AUTO: 182 K/MM3 (ref 134–434)
PMV BLD: 10.9 FL (ref 7.5–11.1)
POTASSIUM SERPLBLD-SCNC: 3.9 MMOL/L (ref 3.5–5.1)
PROT SERPL-MCNC: 8.8 G/DL (ref 6.4–8.2)
RBC # BLD AUTO: 4.77 M/MM3 (ref 4–5.6)
SODIUM SERPL-SCNC: 137 MMOL/L (ref 136–145)
WBC # BLD AUTO: 2.7 K/MM3 (ref 4–10)

## 2020-08-07 PROCEDURE — U0003 INFECTIOUS AGENT DETECTION BY NUCLEIC ACID (DNA OR RNA); SEVERE ACUTE RESPIRATORY SYNDROME CORONAVIRUS 2 (SARS-COV-2) (CORONAVIRUS DISEASE [COVID-19]), AMPLIFIED PROBE TECHNIQUE, MAKING USE OF HIGH THROUGHPUT TECHNOLOGIES AS DESCRIBED BY CMS-2020-01-R: HCPCS

## 2020-08-07 PROCEDURE — HZ2ZZZZ DETOXIFICATION SERVICES FOR SUBSTANCE ABUSE TREATMENT: ICD-10-PCS | Performed by: ALLERGY & IMMUNOLOGY

## 2020-08-07 RX ADMIN — Medication SCH TAB: at 12:39

## 2020-08-07 RX ADMIN — IBUPROFEN PRN MG: 400 TABLET, FILM COATED ORAL at 23:47

## 2020-08-07 RX ADMIN — METHOCARBAMOL PRN MG: 500 TABLET ORAL at 22:33

## 2020-08-07 RX ADMIN — Medication SCH MG: at 22:34

## 2020-08-07 RX ADMIN — Medication SCH MG: at 22:33

## 2020-08-07 RX ADMIN — NICOTINE SCH MG: 7 PATCH TRANSDERMAL at 12:37

## 2020-08-07 NOTE — BHS.RME
Substance Use & Tx History





- Substance Use History


  ** Alcohol


Substance amount: 2 CASES OF 24 OZ BEERS


Frequency of use: Daily


Substance route: Oral


Date of Last Use: 08/07/20 (8AM)





  ** Cocaine-Crack


Substance amount: $500


Frequency of use: Daily


Substance route: Smoking


Date of Last Use: 08/06/20





  ** Marijuana/Hashish


Substance amount: 1/4 pound


Frequency of use: Daily


Substance route: Oral, Smoking


Date of Last Use: 08/07/20





  ** Nicotine


Substance amount: 1 pack


Frequency of use: Daily


Substance route: Smoking


Date of Last Use: 08/07/20





- Last Treatment


Date of last treatment: 2/3-2/22/20


Treatment type: Substance Use Disorder (SEN)


Where was last treatment: Rehab





Physical/Psych/Mental Status





- Behavior


General Behavior: Increased activity (restlessness, agitation)


Eye Contact: Normal





- Cooperativeness


Cooperativeness: Cooperative





- Thinking


Thought Processes: Tight, Logical, Goal Directed





- Physical Health Problems


Is patient presently having any pain?: No


Does patient presently have any injuries (include location): No


Does patient currently have a fever: No


Is patient pregnant: No





CIWA


Nausea/Vomiting: 3


Muscle Tremors: 4-Moderate,w/Arms Extend


Anxiety: 2


Agitation: 3


Paroxysmal Sweats: 2


Orientation: 1-Uncertain about Date


Tacttile Disturbances: 0-None


Auditory Disturbances: 0-None


Visual Disturbances: 0-None


Headache: 0-None Present


CIWA-Ar Total Score: 15

## 2020-08-07 NOTE — HP
CIWA Score


Nausea/Vomiting: 3


Muscle Tremors: 4-Moderate,w/Arms Extend


Anxiety: 2


Agitation: 3


Paroxysmal Sweats: 2


Orientation: 1-Uncertain about Date


Tacttile Disturbances: 0-None


Auditory Disturbances: 0-None


Visual Disturbances: 0-None


Headache: 0-None Present


CIWA-Ar Total Score: 15





- Admission Criteria


[facility] Guidelines: Admission for Medically Managed Detox: 


Requires at least one of the followin. CIWA greater than 12


2. Seizures within the past 24 hours


3. Delirium tremens within the past 24 hours


4. Hallucinations within the past 24 hours


5. Acute intervention needed for co  occurring medical disorder


6. Acute intervention needed for co  occurring psychiatric disorder


7. Severe withdrawal that cannot be handled at a lower level of care (continued


    vomiting, continued diarrhea, abnormal vital signs) requiring intravenous


    medication and/or fluids


8. Pregnancy








Admitting History and Physical





- Admission


Chief Complaint: " I need to stop drinking."


History of Present Illness: 





55 year old male with history of alcohol dependence with withdrawal, cocaine use

disorder, cannabis use disorder, nicotine dependence.


He was last here in -20 and completed detox and rehab and relapsed 3 

months later.





Substance Use & Tx History





- Substance Use History


  ** Alcohol


Substance amount: 2 CASES OF 24 OZ BEERS


Frequency of use: Daily


Substance route: Oral


Date of Last Use: 20 (8AM)


Seizure 1 year ago and multiple blackouts, and endorses eye opener daily





  ** Cocaine-Crack


Substance amount: $500


Frequency of use: Daily


Substance route: Smoking


Date of Last Use: 20





  ** Marijuana/Hashish


Substance amount: 1/4 pound


Frequency of use: Daily


Substance route: Oral, Smoking


Date of Last Use: 20





  ** Nicotine


Substance amount: 1 pack


Frequency of use: Daily


Substance route: Smoking


Date of Last Use: 20





PMH:  HIV+, Asthma


Psurg:  rectal Surgery


Psych:  None


Lives in [facility], no legal issues pending.





ASA= 0.180


CIWA=15





He meets criteria for detox as he has poor insight into his disease and has 

multiple failures in addition he has multiple medical co-morbidities.


History Source: Patient


Limitations to Obtaining History: No Limitations





- Past Medical History


CNS: Yes: Syncope


Pulmonary: Yes: Asthma


Gastrointestinal: Yes: Cancer (anal), Other (anal cancer surgery 0n 19)


Infectious Disease: Yes: HIV (since  non compliance no medication for 4 

months)


Psych: Yes: Addictions





- Past Surgical History


Additional Past Surgical History: 





Rectal Surgery





- Smoking History


Smoking history: Current every day smoker


Have you smoked in the past 12 months: Yes


Aproximately how many cigarettes per day: 20





- Alcohol/Substance Use


Hx Alcohol Use: Yes


History of Substance Use: reports: Cocaine, Marijuana





- Social History


Usual Living Arrangement: Yes: Alone


Do you think of yourself as: Yusuf, Lesbian or Homosexual


ADL: Support Services


Occupation: unemployed


History of Recent Travel: No





Admission ROS S





- HPI


Allergies/Adverse Reactions: 


                                    Allergies











Allergy/AdvReac Type Severity Reaction Status Date / Time


 


No Known Allergies Allergy   Verified 20 14:35











Exam Limitations: No Limitations





- Ebola screening


Have you traveled outside of the country in the last 21 days: No


Have you had contact with anyone from an Ebola affected area: No


Have you been sick,other than usual withdrawal symptoms: No


Do you have a fever: No





- Review of Systems


Constitutional: Chills, Diaphoresis, Unintentional Wgt. Loss


EENT: reports: No Symptoms Reported


Respiratory: reports: No Symptoms reported


Cardiac: reports: No Symptoms Reported


GI: reports: No Symptoms Reported


: reports: No Symptoms Reported


Musculoskeletal: reports: No Symptoms Reported


Integumentary: reports: No Symptoms Reported


Neuro: reports: No Symptoms reported


Endocrine: reports: No Symptoms Reported


Hematology: reports: No Symptoms Reported


Psychiatric: reports: Judgement Intact, Mood/Affect Appropiate, Orientated x3, 

Agitated, Anxious


Other Systems: Reviewed and Negative





Patient History





- Patient Medical History


Hx Anemia: No


Hx Asthma: Yes


Hx Chronic Obstructive Pulmonary Disease (COPD): No


Hx Cancer: Yes (of anus had surgery)


Hx Cardiac Disorders: Yes


Hx Congestive Heart Failure: No


Hx Hypertension: No


Hx Hypercholesterolemia: No


Hx Pacemaker: No


HX Cerebrovascular Accident: No


Hx Seizures: No


Hx Dementia: No


Hx Diabetes: No


Hx Gastrointestinal Disorders: No


Hx Liver Disease: No


Hx Genitourinary Disorders: No


Hx Sexually Transmitted Disorders: Yes


Hx Renal Disease (ESRD): No


Hx Thyroid Disease: No


Hx Human Immunodeficiency Virus (HIV): Yes (since )


Hx Hepatitis C: No


Hx Depression: Yes


Hx Suicide Attempt: No


Hx Bipolar Disorder: No


Hx Schizophrenia: No





- Patient Surgical History


Past Surgical History: Yes


Hx Neurologic Surgery: No


Hx Cataract Extraction: No


Hx Cardiac Surgery: No


Hx Lung Surgery: No


Hx Breast Surgery: No


Hx Breast Biopsy: No


Hx Abdominal Surgery: No


Hx Appendectomy: No


Hx Cholecystectomy: No


Hx Genitourinary Surgery: No


Hx  Section: No


Hx Orthopedic Surgery: No


Other Surgical History: removal of anal cancer 0n 19 and 19 at 

[facility]


Anesthesia Reaction: No





- PPD History


Previous Implant?: Yes


Documented Results: Negative w/proof


Implanted On Prior [facility] Admission?: Yes


Date: 02/10/20


Results: tb gold neg


PPD to be Administered?: No





- Smoking Cessation


Smoking history: Current every day smoker


Have you smoked in the past 12 months: Yes


Aproximately how many cigarettes per day: 20


Hx Chewing Tobacco Use: No


Initiated information on smoking cessation: Yes


'Breaking Loose' booklet given: 20





- Substances abused


  ** Alcohol


Substance route: Oral


Frequency: Daily


Amount used: 2 cases 24 oz beers


Age of first use: 17


Date of last use: 20





  ** Crack


Substance route: Smoking


Frequency: Daily


Amount used: $500


Age of first use: 17


Date of last use: 20





  ** Marijuana/Hashish


Substance route: Smoking


Frequency: Daily


Amount used: 1/4 lb


Age of first use: 17


Date of last use: 20





Admission Physical Exam S





- Physical


General Appearance: Yes: Mild Distress, Tremorous, Irritable, Sweating, Anxious


HEENTM: Yes: EOMI, Hearing grossly Normal, Normal ENT Inspection, Normocephalic,

Normal Voice, FABIOLA, Pharynx Normal, Tm's normal


Respiratory: Yes: Chest Non-Tender, Lungs Clear, Normal Breath Sounds, No Respir

atory Distress, No Accessory Muscle Use


Neck: Yes: No masses,lesions,Nodules, Supple, Trachea in good position


Breast: Yes: Within Normal Limits


Cardiology: Yes: Regular Rhythm, Regular Rate, S1, S2


Abdominal: Yes: Normal Bowel Sounds, Non Tender, Flat, Soft


Genitourinary: Yes: Within Normal Limits


Back: Yes: Normal Inspection


Musculoskeletal: Yes: full range of Motion, Gait Steady, Pelvis Stable


Extremities: Yes: Normal Capillary Refill, Normal Inspection, Normal Range of 

Motion, Non-Tender


Neurological: Yes: CNs II-XII NML intact, Fully Oriented, Alert, Motor Strength 

5/5, Normal Mood/Affect, Normal Response


Integumentary: Yes: Normal Color, Dry, Warm


Lymphatic: Yes: Within Normal Limits





- Diagnostic


(1) Alcohol dependence with uncomplicated intoxication


Current Visit: Yes   Status: Acute   





(2) Candidiasis of skin and nails


Current Visit: Yes   Status: Acute   





(3) Cannabis dependence


Current Visit: Yes   Status: Acute   





(4) Cocaine abuse


Current Visit: Yes   Status: Acute   





(5) Asthma


Current Visit: Yes   Status: Chronic   


Qualifiers: 


   Asthma severity: mild   Asthma persistence: intermittent   Asthma c

omplication type: uncomplicated   Qualified Code(s): J45.20 - Mild intermittent 

asthma, uncomplicated   





(6) Cocaine dependence


Current Visit: Yes   Status: Chronic   


Qualifiers: 


   Substance use status: uncomplicated   Qualified Code(s): F14.20 - Cocaine 

dependence, uncomplicated   





(7) HIV antibody positive


Current Visit: Yes   Status: Chronic   





(8) Nicotine dependence


Current Visit: Yes   Status: Chronic   


Qualifiers: 


   Nicotine product type: cigarettes   Substance use status: in withdrawal   

Qualified Code(s): F17.213 - Nicotine dependence, cigarettes, with withdrawal   





(9) History of anal cancer


Current Visit: Yes   Status: Resolved   





Cleared for Admission S





- Detox or Rehab


S Level of Care: Medically Managed


Detox Regimen/Protocol: Librium


Claeared for Rehab Admission: No





Screened but not Admitted





- Documentation of Visit


Screened but not Admitted: No





Breathalyzer





- Breathalyzer


Breathalyzer: 0





Vital Signs





- Vital Signs


Vital signs refused: No


Temperature: 97 F (6)


Temperature source: Oral


Pulse Rate: 90


Respiratory Rate: 19


Blood Pressure: 141/86


BP Location: Left Arm


Blood Pressure position: Sitting





- Height


Height: 5 ft 9 in





- Weight


Weight: 171 lb


Weight measurement method: Standing scale





- BMI


Body Mass Index (BMI): 25.2





- Bowel Function


Bowel Movement: No





Urine Drug Screen





- Test Device


Lot number: [id number]


Expiration date: 21





- Control


Is test valid?: Yes





- Results


Drug screen NEGATIVE: No


Urine drug screen results: SHONNA-Cocaine, BZO-Benzodiazepines





Inpatient Rehab Admission





- Rehab Decision to Admit


Inpatient rehab admission?: No

## 2020-08-08 RX ADMIN — Medication SCH TAB: at 10:33

## 2020-08-08 RX ADMIN — BICTEGRAVIR SODIUM, EMTRICITABINE, AND TENOFOVIR ALAFENAMIDE FUMARATE SCH EACH: 50; 200; 25 TABLET ORAL at 10:32

## 2020-08-08 RX ADMIN — Medication SCH MG: at 23:00

## 2020-08-08 RX ADMIN — BACITRACIN ZINC SCH GM: 500 OINTMENT TOPICAL at 10:33

## 2020-08-08 RX ADMIN — HYDROCORTISONE PRN APPLIC: 0.5 CREAM TOPICAL at 10:35

## 2020-08-08 RX ADMIN — NICOTINE SCH MG: 7 PATCH TRANSDERMAL at 10:33

## 2020-08-08 RX ADMIN — IBUPROFEN PRN MG: 400 TABLET, FILM COATED ORAL at 18:07

## 2020-08-08 NOTE — PN
[facility]HS Progress Note


Note: 





                                Last Vital Signs











Temp Pulse Resp BP Pulse Ox


 


 97 F L  62   16   113/71   98 


 


 08/08/20 06:05  08/08/20 06:05  08/08/20 06:05  08/08/20 06:05  08/08/20 06:05








ASKED BY RN TO SEE CLIENT FOR R RING FINGER C/O PAIN.  CLIENT WAS SEEN LYING IN 

BED. DENIES PAIN TO R RING FINGER, STATES IT IS HIS LEFT POINTER FINGER AND HOW 

IT FEELS MUCH BETTER SINCE LAST NIGHT. WRITER ASKED TO EXAMINE FINGER WHICH WAS 

COVERED IN A BAND AID. CLIENT BECAME AGITATED AND RUDE TELLING WRITER IF SHE 

DIDN'T HAVE A  OR A MAN BECAUSE HE WAS BEING  "BOTHERED" THIS MORNING. 

CLIENT DISMISSED WRITER AND TOLD HER "TO GET UP OUT OF HERE".





CLIENT IS A/O X3 NAD, IRRITABLE





CLIENT WILL BE ENDORSED TO THE NEXT SHIFT.

## 2020-08-08 NOTE — PN
[facility] CIWA





- CIWA Score


Nausea/Vomitin-No Nausea/No Vomiting


Muscle Tremors: 3


Anxiety: 2


Agitation: 2


Paroxysmal Sweats: 2


Orientation: 0-Oriented


Tacttile Disturbances: 0-None


Auditory Disturbances: 2-Mild Harshness/Frighten


Visual Disturbances: 1-Very Mild Sensitivity


Headache: 0-None Present


CIWA-Ar Total Score: 12





BHS Progress Note (SOAP)


Subjective: 





Complaints of anxiety, sweats, tremors, light and noise sensitivity.


Objective: 





20 13:34


                                   Vital Signs











  20





  06:05 10:09


 


Temperature 97 F L 97.1 F L


 


Pulse Rate 62 82


 


Respiratory 16 16





Rate  


 


Blood Pressure 113/71 118/74


 


O2 Sat by Pulse 98 98





Oximetry (%)  








                             Laboratory Last Values











WBC  2.7 K/mm3 (4.0-10.0)  L  20  10:40    


 


RBC  4.77 M/mm3 (4.00-5.60)   20  10:40    


 


Hgb  13.5 GM/dL (11.7-16.9)   20  10:40    


 


Hct  41.0 % (35.4-49)  D 20  10:40    


 


MCV  86.0 fl (80-96)   20  10:40    


 


MCH  28.2 pg (25.7-33.7)   20  10:40    


 


MCHC  32.8 g/dl (32.0-35.9)   20  10:40    


 


RDW  16.4 % (11.9-15.9)  H  20  10:40    


 


Plt Count  182 K/MM3 (134-434)  D 20  10:40    


 


MPV  10.9 fl (7.5-11.1)  D 20  10:40    


 


Sodium  137 mmol/L (136-145)   20  10:40    


 


Potassium  3.9 mmol/L (3.5-5.1)   20  10:40    


 


Chloride  106 mmol/L ()   20  10:40    


 


Carbon Dioxide  22 mmol/L (21-32)   20  10:40    


 


Anion Gap  9 MMOL/L (8-16)   20  10:40    


 


BUN  7.3 mg/dL (7-18)   20  10:40    


 


Creatinine  1.0 mg/dL (0.55-1.3)   20  10:40    


 


Est GFR (CKD-EPI)AfAm  99.15   20  10:40    


 


Est GFR (CKD-EPI)NonAf  85.55   20  10:40    


 


Random Glucose  120 mg/dL ()  H  20  10:40    


 


Calcium  8.8 mg/dL (8.5-10.1)   20  10:40    


 


Total Bilirubin  0.5 mg/dL (0.2-1)   20  10:40    


 


AST  31 U/L (15-37)   20  10:40    


 


ALT  24 U/L (13-61)   20  10:40    


 


Alkaline Phosphatase  139 U/L ()  H  20  10:40    


 


Total Protein  8.8 g/dl (6.4-8.2)  H  20  10:40    


 


Albumin  3.4 g/dl (3.4-5.0)   20  10:40    


 


Syphilis Serology  Non-reactive  (NONREACTIVE)   20  10:40    








Labs noted.


Assessment: 





20 13:36


Alert and oriented x3, in no acute respiratory distress.


Full ROM, ambulating in hallway without assistance.


Skin warm to touch.


Withdrawal symptoms.


Plan: 





Continue detox protocol.

## 2020-08-09 RX ADMIN — Medication SCH MG: at 21:46

## 2020-08-09 RX ADMIN — HYDROCORTISONE PRN APPLIC: 0.5 CREAM TOPICAL at 10:12

## 2020-08-09 RX ADMIN — BICTEGRAVIR SODIUM, EMTRICITABINE, AND TENOFOVIR ALAFENAMIDE FUMARATE SCH EACH: 50; 200; 25 TABLET ORAL at 10:12

## 2020-08-09 RX ADMIN — Medication SCH TAB: at 10:12

## 2020-08-09 RX ADMIN — Medication SCH MG: at 21:48

## 2020-08-09 RX ADMIN — IBUPROFEN PRN MG: 400 TABLET, FILM COATED ORAL at 15:55

## 2020-08-09 RX ADMIN — IBUPROFEN PRN MG: 400 TABLET, FILM COATED ORAL at 06:28

## 2020-08-09 RX ADMIN — BACITRACIN ZINC SCH GM: 500 OINTMENT TOPICAL at 10:11

## 2020-08-09 RX ADMIN — IBUPROFEN PRN MG: 400 TABLET, FILM COATED ORAL at 21:45

## 2020-08-09 RX ADMIN — NICOTINE SCH MG: 7 PATCH TRANSDERMAL at 10:11

## 2020-08-09 NOTE — PN
S CIWA





- CIWA Score


Nausea/Vomitin-Mild Nausea/No Vomiting


Muscle Tremors: 2


Anxiety: 4-Mod. Anxious/Guarded


Agitation: 0-Normal Activity


Paroxysmal Sweats: 2


Orientation: 0-Oriented


Tacttile Disturbances: 0-None


Auditory Disturbances: 0-None


Visual Disturbances: 0-None


Headache: 2-Mild


CIWA-Ar Total Score: 11





BHS Progress Note (SOAP)


Subjective: 





PATIENT ADMITTED FOR ETOH WITHDRAWAL SX





C/O PAIN TO RIGHT RING FINGER, ANXIETY, SHAKES, SWEATS AND HEADACHE.


Objective: 





20 12:36


                                   Vital Signs











Temperature  98.3 F   20 09:01


 


Pulse Rate  74   20 09:01


 


Respiratory Rate  16   20 09:01


 


Blood Pressure  120/68   20 09:01


 


O2 Sat by Pulse Oximetry (%)  95   20 09:01








                                Laboratory Tests











  20





  10:40 10:40 10:40


 


WBC  2.7 L  


 


RBC  4.77  


 


Hgb  13.5  


 


Hct  41.0  D  


 


MCV  86.0  


 


MCH  28.2  


 


MCHC  32.8  


 


RDW  16.4 H  


 


Plt Count  182  D  


 


MPV  10.9  D  


 


Sodium   137 


 


Potassium   3.9 


 


Chloride   106 


 


Carbon Dioxide   22 


 


Anion Gap   9 


 


BUN   7.3 


 


Creatinine   1.0 


 


Est GFR (CKD-EPI)AfAm   99.15 


 


Est GFR (CKD-EPI)NonAf   85.55 


 


Random Glucose   120 H 


 


Calcium   8.8 


 


Total Bilirubin   0.5 


 


AST   31 


 


ALT   24 


 


Alkaline Phosphatase   139 H 


 


Total Protein   8.8 H 


 


Albumin   3.4 


 


Syphilis Serology    Non-reactive


 


COVID-19 (IRMA)   














  20





  12:00


 


WBC 


 


RBC 


 


Hgb 


 


Hct 


 


MCV 


 


MCH 


 


MCHC 


 


RDW 


 


Plt Count 


 


MPV 


 


Sodium 


 


Potassium 


 


Chloride 


 


Carbon Dioxide 


 


Anion Gap 


 


BUN 


 


Creatinine 


 


Est GFR (CKD-EPI)AfAm 


 


Est GFR (CKD-EPI)NonAf 


 


Random Glucose 


 


Calcium 


 


Total Bilirubin 


 


AST 


 


ALT 


 


Alkaline Phosphatase 


 


Total Protein 


 


Albumin 


 


Syphilis Serology 


 


COVID-19 (IRMA)  Not detected








PE





ALERT AND ORIENTED X 3


SKIN WARM, +MOISTURE TO FOREHEAD


+PERRLA, EOMS INTACT BL


EXT MILD TREMORS, FULL ROM


RIGHT RING FINGER WITH MILD SWELLING, NO REDNESS, + ROM- FLEXION/EXTENSION


ANXIOUS/GUARDED











Assessment: 





20 12:38


ETOH WITHDRAWAL SX


RIGHT RING FINGER DISCOMFORT


Plan: 





CONTINUE DETOX


INCREASE MOTRIN TO 600MG Q6H PRN

## 2020-08-10 RX ADMIN — Medication SCH TAB: at 10:23

## 2020-08-10 RX ADMIN — HYDROCORTISONE PRN APPLIC: 0.5 CREAM TOPICAL at 22:06

## 2020-08-10 RX ADMIN — Medication SCH MG: at 22:05

## 2020-08-10 RX ADMIN — METHOCARBAMOL PRN MG: 500 TABLET ORAL at 15:34

## 2020-08-10 RX ADMIN — BICTEGRAVIR SODIUM, EMTRICITABINE, AND TENOFOVIR ALAFENAMIDE FUMARATE SCH EACH: 50; 200; 25 TABLET ORAL at 08:00

## 2020-08-10 RX ADMIN — CLINDAMYCIN HYDROCHLORIDE SCH MG: 150 CAPSULE ORAL at 17:27

## 2020-08-10 RX ADMIN — SULFAMETHOXAZOLE AND TRIMETHOPRIM SCH EACH: 800; 160 TABLET ORAL at 22:04

## 2020-08-10 RX ADMIN — METHOCARBAMOL PRN MG: 500 TABLET ORAL at 22:04

## 2020-08-10 RX ADMIN — BACITRACIN ZINC SCH GM: 500 OINTMENT TOPICAL at 10:23

## 2020-08-10 RX ADMIN — IBUPROFEN PRN MG: 400 TABLET, FILM COATED ORAL at 05:57

## 2020-08-10 RX ADMIN — SULFAMETHOXAZOLE AND TRIMETHOPRIM SCH EACH: 800; 160 TABLET ORAL at 10:23

## 2020-08-10 RX ADMIN — ACETAMINOPHEN PRN MG: 325 TABLET ORAL at 15:34

## 2020-08-10 RX ADMIN — Medication SCH MG: at 22:06

## 2020-08-10 RX ADMIN — CLINDAMYCIN HYDROCHLORIDE SCH MG: 150 CAPSULE ORAL at 23:03

## 2020-08-10 RX ADMIN — CLINDAMYCIN HYDROCHLORIDE SCH MG: 150 CAPSULE ORAL at 12:12

## 2020-08-10 RX ADMIN — NICOTINE SCH MG: 7 PATCH TRANSDERMAL at 10:23

## 2020-08-10 NOTE — PN
[facility] CIWA





- CIWA Score


Nausea/Vomitin-No Nausea/No Vomiting


Muscle Tremors: 2


Anxiety: 2


Agitation: 2


Paroxysmal Sweats: No Perspiration


Orientation: 0-Oriented


Tacttile Disturbances: 0-None


Auditory Disturbances: 0-None


Visual Disturbances: 0-None


Headache: 1-Very Mild


CIWA-Ar Total Score: 7





BHS Progress Note (SOAP)


Subjective: 





alert,irritable,anxious,interrupted sleep,tremor,pain in left index paronychia


Objective: 





08/10/20 13:02


                                   Vital Signs











Temperature  97.2 F L  08/10/20 08:48


 


Pulse Rate  65   08/10/20 08:48


 


Respiratory Rate  16   08/10/20 08:48


 


Blood Pressure  142/77   08/10/20 08:48


 


O2 Sat by Pulse Oximetry (%)  98   08/10/20 05:46








                             Laboratory Last Values











WBC  2.7 K/mm3 (4.0-10.0)  L  20  10:40    


 


RBC  4.77 M/mm3 (4.00-5.60)   20  10:40    


 


Hgb  13.5 GM/dL (11.7-16.9)   20  10:40    


 


Hct  41.0 % (35.4-49)  D 20  10:40    


 


MCV  86.0 fl (80-96)   20  10:40    


 


MCH  28.2 pg (25.7-33.7)   20  10:40    


 


MCHC  32.8 g/dl (32.0-35.9)   20  10:40    


 


RDW  16.4 % (11.9-15.9)  H  20  10:40    


 


Plt Count  182 K/MM3 (134-434)  D 20  10:40    


 


MPV  10.9 fl (7.5-11.1)  D 20  10:40    


 


Sodium  137 mmol/L (136-145)   20  10:40    


 


Potassium  3.9 mmol/L (3.5-5.1)   20  10:40    


 


Chloride  106 mmol/L ()   20  10:40    


 


Carbon Dioxide  22 mmol/L (21-32)   20  10:40    


 


Anion Gap  9 MMOL/L (8-16)   20  10:40    


 


BUN  7.3 mg/dL (7-18)   20  10:40    


 


Creatinine  1.0 mg/dL (0.55-1.3)   20  10:40    


 


Est GFR (CKD-EPI)AfAm  99.15   20  10:40    


 


Est GFR (CKD-EPI)NonAf  85.55   20  10:40    


 


Random Glucose  120 mg/dL ()  H  20  10:40    


 


Calcium  8.8 mg/dL (8.5-10.1)   20  10:40    


 


Total Bilirubin  0.5 mg/dL (0.2-1)   20  10:40    


 


AST  31 U/L (15-37)   20  10:40    


 


ALT  24 U/L (13-61)   20  10:40    


 


Alkaline Phosphatase  139 U/L ()  H  20  10:40    


 


Total Protein  8.8 g/dl (6.4-8.2)  H  20  10:40    


 


Albumin  3.4 g/dl (3.4-5.0)   20  10:40    


 


Syphilis Serology  Non-reactive  (NONREACTIVE)   20  10:40    


 


COVID-19 (IRMA)  Not detected  (Not Detected)   20  12:00    











Assessment: 





08/10/20 13:03


withdrawal symptom


08/10/20 13:03


paronychia with swelling of left index movement of finger with pain


Plan: 





continue detox librium regimen,start on bactrim ds 1 tab po bid,clindamycin 300 

mgs po 6 hrs for 7 days

## 2020-08-11 RX ADMIN — Medication SCH MG: at 22:01

## 2020-08-11 RX ADMIN — NICOTINE SCH MG: 7 PATCH TRANSDERMAL at 10:08

## 2020-08-11 RX ADMIN — ACETAMINOPHEN PRN MG: 325 TABLET ORAL at 06:06

## 2020-08-11 RX ADMIN — BICTEGRAVIR SODIUM, EMTRICITABINE, AND TENOFOVIR ALAFENAMIDE FUMARATE SCH EACH: 50; 200; 25 TABLET ORAL at 10:15

## 2020-08-11 RX ADMIN — BACITRACIN ZINC SCH GM: 500 OINTMENT TOPICAL at 10:08

## 2020-08-11 RX ADMIN — CLINDAMYCIN HYDROCHLORIDE SCH MG: 150 CAPSULE ORAL at 06:04

## 2020-08-11 RX ADMIN — METHOCARBAMOL PRN MG: 500 TABLET ORAL at 06:05

## 2020-08-11 RX ADMIN — CLINDAMYCIN HYDROCHLORIDE SCH MG: 150 CAPSULE ORAL at 12:24

## 2020-08-11 RX ADMIN — Medication SCH TAB: at 10:08

## 2020-08-11 RX ADMIN — CLINDAMYCIN HYDROCHLORIDE SCH MG: 150 CAPSULE ORAL at 18:00

## 2020-08-11 RX ADMIN — CLINDAMYCIN HYDROCHLORIDE SCH MG: 150 CAPSULE ORAL at 23:10

## 2020-08-11 RX ADMIN — SULFAMETHOXAZOLE AND TRIMETHOPRIM SCH EACH: 800; 160 TABLET ORAL at 10:11

## 2020-08-11 RX ADMIN — SULFAMETHOXAZOLE AND TRIMETHOPRIM SCH EACH: 800; 160 TABLET ORAL at 22:01

## 2020-08-11 NOTE — PN
[facility] CIWA





- CIWA Score


Nausea/Vomitin-Mild Nausea/No Vomiting


Muscle Tremors: 2


Anxiety: 2


Agitation: 2


Paroxysmal Sweats: No Perspiration


Orientation: 0-Oriented


Tacttile Disturbances: 0-None


Auditory Disturbances: 0-None


Visual Disturbances: 0-None


Headache: 2-Mild


CIWA-Ar Total Score: 9





BHS Progress Note (SOAP)


Subjective: 





alert,irriable,anxious,interrupted sleep,tremor,pain in left index,paronychia 

with spontaneous drainage


Objective: 





20 11:59


                                   Vital Signs











Temperature  96.8 F L  20 08:47


 


Pulse Rate  76   20 08:47


 


Respiratory Rate  19   20 08:47


 


Blood Pressure  111/79   20 08:47


 


O2 Sat by Pulse Oximetry (%)  100   20 05:34











Assessment: 





20 11:59


withdrawal symptom


Plan: 





continue detox librium regimen,continue bactrim ds 1 tab po bid,clindamycin 300 

mgs po q 6hrs,fasting glucose in am,initial glucose 120 mgs

## 2020-08-12 ENCOUNTER — HOSPITAL ENCOUNTER (INPATIENT)
Dept: HOSPITAL 74 - YASAS | Age: 55
LOS: 28 days | Discharge: HOME | DRG: 772 | End: 2020-09-09
Attending: ALLERGY & IMMUNOLOGY | Admitting: ALLERGY & IMMUNOLOGY
Payer: COMMERCIAL

## 2020-08-12 VITALS — HEART RATE: 71 BPM | TEMPERATURE: 97.1 F | DIASTOLIC BLOOD PRESSURE: 93 MMHG | SYSTOLIC BLOOD PRESSURE: 134 MMHG

## 2020-08-12 DIAGNOSIS — F14.20: ICD-10-CM

## 2020-08-12 DIAGNOSIS — L03.012: ICD-10-CM

## 2020-08-12 DIAGNOSIS — Z21: ICD-10-CM

## 2020-08-12 DIAGNOSIS — J45.909: ICD-10-CM

## 2020-08-12 DIAGNOSIS — Z85.048: ICD-10-CM

## 2020-08-12 DIAGNOSIS — F12.20: ICD-10-CM

## 2020-08-12 DIAGNOSIS — F17.210: ICD-10-CM

## 2020-08-12 DIAGNOSIS — F10.20: Primary | ICD-10-CM

## 2020-08-12 PROCEDURE — HZ42ZZZ GROUP COUNSELING FOR SUBSTANCE ABUSE TREATMENT, COGNITIVE-BEHAVIORAL: ICD-10-PCS | Performed by: ALLERGY & IMMUNOLOGY

## 2020-08-12 RX ADMIN — CLINDAMYCIN HYDROCHLORIDE SCH MG: 150 CAPSULE ORAL at 05:54

## 2020-08-12 RX ADMIN — IBUPROFEN PRN MG: 400 TABLET, FILM COATED ORAL at 17:24

## 2020-08-12 RX ADMIN — CLINDAMYCIN HYDROCHLORIDE SCH MG: 150 CAPSULE ORAL at 23:05

## 2020-08-12 RX ADMIN — Medication SCH TAB: at 10:24

## 2020-08-12 RX ADMIN — NICOTINE SCH: 7 PATCH TRANSDERMAL at 10:24

## 2020-08-12 RX ADMIN — SULFAMETHOXAZOLE AND TRIMETHOPRIM SCH EACH: 800; 160 TABLET ORAL at 10:24

## 2020-08-12 RX ADMIN — HYDROXYZINE PAMOATE SCH MG: 25 CAPSULE ORAL at 21:05

## 2020-08-12 RX ADMIN — ACETAMINOPHEN PRN MG: 325 TABLET ORAL at 05:56

## 2020-08-12 RX ADMIN — BACITRACIN ZINC SCH GM: 500 OINTMENT TOPICAL at 10:24

## 2020-08-12 RX ADMIN — CLINDAMYCIN HYDROCHLORIDE SCH MG: 150 CAPSULE ORAL at 17:24

## 2020-08-12 RX ADMIN — HYDROXYZINE PAMOATE SCH MG: 25 CAPSULE ORAL at 17:24

## 2020-08-12 RX ADMIN — BICTEGRAVIR SODIUM, EMTRICITABINE, AND TENOFOVIR ALAFENAMIDE FUMARATE SCH EACH: 50; 200; 25 TABLET ORAL at 08:22

## 2020-08-12 RX ADMIN — Medication SCH MG: at 21:05

## 2020-08-12 RX ADMIN — SULFAMETHOXAZOLE AND TRIMETHOPRIM SCH EACH: 800; 160 TABLET ORAL at 21:05

## 2020-08-12 NOTE — HP
[facility] MD Rehab Assess/Revision





- Admission History


Admitted to Rehab from: [facility]


Date of Admission to Rehab: 08/12/2020





- Vital signs


Vital Signs: 


                                   Vital Signs











 Period  Temp  Pulse  Resp  BP Sys/Hyde  Pulse Ox


 


 Last 24 Hr  96.9 F  64  18  105/68  98-98














- Findings


Detox History & Physical reviewed: Yes


Concur with findings: Yes


Comments/Additional Findings: for rehab as protocol





Inpatient Rehab Admission





- Rehab Decision to Admit


Inpatient rehab admission?: Yes





- Initial Determination


Are CD services needed?: Yes


Free of communicable disease: Yes


Not in need of hospitalization: Yes





- Rehab Admission Criteria


Previous failed treatment: Yes


Poor recovery environment: Yes


Comorbidities: Yes


Lacks judgement: No


Patient is meeting Inpatient Rehab admission criteria:: Yes

## 2020-08-12 NOTE — PN
[facility] CIWA





- CIWA Score


Nausea/Vomitin-No Nausea/No Vomiting


Muscle Tremors: None


Anxiety: 1-Mildly Anxious


Agitation: 0-Normal Activity


Paroxysmal Sweats: No Perspiration


Orientation: 0-Oriented


Tacttile Disturbances: 0-None


Auditory Disturbances: 0-None


Visual Disturbances: 0-None


Headache: 0-None Present


CIWA-Ar Total Score: 1





[facility]HS Progress Note (SOAP)


Subjective: 





alert,no complaint,left index finger no drainage,no swelling


Objective: 





20 09:48


                                   Vital Signs











Temperature  97.5 F L  20 06:36


 


Pulse Rate  60   20 06:36


 


Respiratory Rate  18   20 06:36


 


Blood Pressure  100/61   20 06:36


 


O2 Sat by Pulse Oximetry (%)  96   20 06:36











Assessment: 





20 09:49


detox completed,no withdrawal symptom


Plan: 





stable for discharge today,follow up with after care program as 

arrangement,revelation

## 2020-08-12 NOTE — DS
[facility] Detox Discharge Summary


Admission Date: 


08/07/20





Discharge Date: 08/12/20





- History


Present History: Alcohol Dependence, Cannabis Dependence, Cocaine Dependence


Additional Comments: 





alert,oriented x 3


ambulation on the unit


lug clear on auscultation


abdomen soft,no pain,no tenderness


left index no drainage,no drainage


detox completed,no withdrawal symptom


stable for discharge


follow up with after care program as arrangement,[facility]


total time on discharge 35 minutes


Pertinent Past History: 





history of anal cancer


paronychia left index


ashma


hiv





- Physical Exam Results


Vital Signs: 


                                   Vital Signs











Temperature  97.5 F L  08/12/20 06:36


 


Pulse Rate  60   08/12/20 06:36


 


Respiratory Rate  18   08/12/20 06:36


 


Blood Pressure  100/61   08/12/20 06:36


 


O2 Sat by Pulse Oximetry (%)  96   08/12/20 06:36











Pertinent Admission Physical Exam Findings: 





withdrawal signs and symptom


                             Laboratory Last Values











WBC  2.7 K/mm3 (4.0-10.0)  L  08/07/20  10:40    


 


RBC  4.77 M/mm3 (4.00-5.60)   08/07/20  10:40    


 


Hgb  13.5 GM/dL (11.7-16.9)   08/07/20  10:40    


 


Hct  41.0 % (35.4-49)  D 08/07/20  10:40    


 


MCV  86.0 fl (80-96)   08/07/20  10:40    


 


MCH  28.2 pg (25.7-33.7)   08/07/20  10:40    


 


MCHC  32.8 g/dl (32.0-35.9)   08/07/20  10:40    


 


RDW  16.4 % (11.9-15.9)  H  08/07/20  10:40    


 


Plt Count  182 K/MM3 (134-434)  D 08/07/20  10:40    


 


MPV  10.9 fl (7.5-11.1)  D 08/07/20  10:40    


 


Sodium  137 mmol/L (136-145)   08/07/20  10:40    


 


Potassium  3.9 mmol/L (3.5-5.1)   08/07/20  10:40    


 


Chloride  106 mmol/L ()   08/07/20  10:40    


 


Carbon Dioxide  22 mmol/L (21-32)   08/07/20  10:40    


 


Anion Gap  9 MMOL/L (8-16)   08/07/20  10:40    


 


BUN  7.3 mg/dL (7-18)   08/07/20  10:40    


 


Creatinine  1.0 mg/dL (0.55-1.3)   08/07/20  10:40    


 


Est GFR (CKD-EPI)AfAm  99.15   08/07/20  10:40    


 


Est GFR (CKD-EPI)NonAf  85.55   08/07/20  10:40    


 


Random Glucose  120 mg/dL ()  H  08/07/20  10:40    


 


Calcium  8.8 mg/dL (8.5-10.1)   08/07/20  10:40    


 


Total Bilirubin  0.5 mg/dL (0.2-1)   08/07/20  10:40    


 


AST  31 U/L (15-37)   08/07/20  10:40    


 


ALT  24 U/L (13-61)   08/07/20  10:40    


 


Alkaline Phosphatase  139 U/L ()  H  08/07/20  10:40    


 


Total Protein  8.8 g/dl (6.4-8.2)  H  08/07/20  10:40    


 


Albumin  3.4 g/dl (3.4-5.0)   08/07/20  10:40    


 


Syphilis Serology  Non-reactive  (NONREACTIVE)   08/07/20  10:40    


 


COVID-19 (IRMA)  Not detected  (Not Detected)   08/07/20  12:00    








                                   Vital Signs











Temperature  97.5 F L  08/12/20 06:36


 


Pulse Rate  60   08/12/20 06:36


 


Respiratory Rate  18   08/12/20 06:36


 


Blood Pressure  100/61   08/12/20 06:36


 


O2 Sat by Pulse Oximetry (%)  96   08/12/20 06:36














- Treatment


Hospital Course: Detox Protocol Followed, Detoxed Safely, Responded well, 

Discharged Condition Good, Rehab Referral Accepted


Patient has Accepted [facility] Rehab Referral to: [facility]





- Medication


Discharge Medications: 


Ambulatory Orders





Albuterol Sulfate Inhaler - [Ventolin HFA Inhaler -] 2 inh PO Q4H #1 inhaler 

03/09/20 


Bictegrav/Emtricit/Tenofov Ala [Biktarvy -25 mg Tablet] 1 each PO DAILY 

#30 tablet 03/10/20 


Clindamycin [Cleocin -] 300 mg PO Q6HPO #20 capsule 08/11/20 


Sulfamethoxazole/Trimethoprim [Bactrim DS -] 1 each PO BID #10 tablet 08/11/20 











- Diagnosis


(1) Alcohol dependence with uncomplicated intoxication


Current Visit: Yes   Status: Acute   





(2) HIV (human immunodeficiency virus infection)


Current Visit: Yes   Status: Acute   





(3) Cannabis dependence


Current Visit: Yes   Status: Acute   





(4) Cocaine abuse


Current Visit: Yes   Status: Acute   





(5) Asthma


Current Visit: Yes   Status: Chronic   


Qualifiers: 


   Asthma severity: mild   Asthma persistence: intermittent   Asthma 

complication type: uncomplicated   Qualified Code(s): J45.20 - Mild intermittent

asthma, uncomplicated   





(6) Nicotine dependence


Current Visit: Yes   Status: Chronic   


Qualifiers: 


   Nicotine product type: cigarettes   Substance use status: in withdrawal   

Qualified Code(s): F17.213 - Nicotine dependence, cigarettes, with withdrawal   





(7) History of anal cancer


Current Visit: Yes   Status: Resolved   





(8) Paronychia


Current Visit: Yes   Status: Acute   





- AMA


Did Patient Leave Against Medical Advice: No

## 2020-08-13 RX ADMIN — HYDROXYZINE PAMOATE SCH MG: 25 CAPSULE ORAL at 10:14

## 2020-08-13 RX ADMIN — Medication SCH TAB: at 10:14

## 2020-08-13 RX ADMIN — CLINDAMYCIN HYDROCHLORIDE SCH MG: 150 CAPSULE ORAL at 23:14

## 2020-08-13 RX ADMIN — BACITRACIN ZINC SCH GM: 500 OINTMENT TOPICAL at 10:15

## 2020-08-13 RX ADMIN — CLINDAMYCIN HYDROCHLORIDE SCH MG: 150 CAPSULE ORAL at 17:58

## 2020-08-13 RX ADMIN — HYDROXYZINE PAMOATE SCH MG: 25 CAPSULE ORAL at 05:57

## 2020-08-13 RX ADMIN — SULFAMETHOXAZOLE AND TRIMETHOPRIM SCH EACH: 800; 160 TABLET ORAL at 10:14

## 2020-08-13 RX ADMIN — CLINDAMYCIN HYDROCHLORIDE SCH MG: 150 CAPSULE ORAL at 05:57

## 2020-08-13 RX ADMIN — Medication SCH MG: at 21:34

## 2020-08-13 RX ADMIN — NICOTINE SCH MG: 7 PATCH TRANSDERMAL at 10:14

## 2020-08-13 RX ADMIN — HYDROXYZINE PAMOATE SCH MG: 25 CAPSULE ORAL at 14:45

## 2020-08-13 RX ADMIN — HYDROXYZINE PAMOATE SCH MG: 25 CAPSULE ORAL at 17:58

## 2020-08-13 RX ADMIN — BICTEGRAVIR SODIUM, EMTRICITABINE, AND TENOFOVIR ALAFENAMIDE FUMARATE SCH EACH: 50; 200; 25 TABLET ORAL at 11:03

## 2020-08-13 RX ADMIN — SULFAMETHOXAZOLE AND TRIMETHOPRIM SCH EACH: 800; 160 TABLET ORAL at 21:34

## 2020-08-13 RX ADMIN — CLINDAMYCIN HYDROCHLORIDE SCH MG: 150 CAPSULE ORAL at 12:39

## 2020-08-13 RX ADMIN — HYDROXYZINE PAMOATE SCH MG: 25 CAPSULE ORAL at 21:34

## 2020-08-14 RX ADMIN — CLINDAMYCIN HYDROCHLORIDE SCH MG: 150 CAPSULE ORAL at 12:28

## 2020-08-14 RX ADMIN — NICOTINE SCH MG: 7 PATCH TRANSDERMAL at 10:06

## 2020-08-14 RX ADMIN — HYDROXYZINE PAMOATE SCH MG: 25 CAPSULE ORAL at 06:22

## 2020-08-14 RX ADMIN — IBUPROFEN PRN MG: 400 TABLET, FILM COATED ORAL at 17:56

## 2020-08-14 RX ADMIN — HYDROXYZINE PAMOATE SCH MG: 25 CAPSULE ORAL at 14:37

## 2020-08-14 RX ADMIN — Medication SCH MG: at 21:28

## 2020-08-14 RX ADMIN — Medication SCH TAB: at 10:05

## 2020-08-14 RX ADMIN — SULFAMETHOXAZOLE AND TRIMETHOPRIM SCH EACH: 800; 160 TABLET ORAL at 21:28

## 2020-08-14 RX ADMIN — HYDROXYZINE PAMOATE SCH MG: 25 CAPSULE ORAL at 17:58

## 2020-08-14 RX ADMIN — SULFAMETHOXAZOLE AND TRIMETHOPRIM SCH EACH: 800; 160 TABLET ORAL at 10:05

## 2020-08-14 RX ADMIN — CLINDAMYCIN HYDROCHLORIDE SCH MG: 150 CAPSULE ORAL at 06:22

## 2020-08-14 RX ADMIN — BACITRACIN ZINC SCH GM: 500 OINTMENT TOPICAL at 10:05

## 2020-08-14 RX ADMIN — CLINDAMYCIN HYDROCHLORIDE SCH MG: 150 CAPSULE ORAL at 17:57

## 2020-08-14 RX ADMIN — BICTEGRAVIR SODIUM, EMTRICITABINE, AND TENOFOVIR ALAFENAMIDE FUMARATE SCH EACH: 50; 200; 25 TABLET ORAL at 07:04

## 2020-08-14 RX ADMIN — HYDROXYZINE PAMOATE SCH MG: 25 CAPSULE ORAL at 10:06

## 2020-08-14 RX ADMIN — Medication SCH MG: at 22:52

## 2020-08-14 RX ADMIN — HYDROXYZINE PAMOATE SCH MG: 25 CAPSULE ORAL at 21:29

## 2020-08-15 RX ADMIN — Medication SCH TAB: at 10:03

## 2020-08-15 RX ADMIN — HYDROXYZINE PAMOATE SCH MG: 25 CAPSULE ORAL at 10:03

## 2020-08-15 RX ADMIN — HYDROXYZINE PAMOATE SCH MG: 25 CAPSULE ORAL at 07:10

## 2020-08-15 RX ADMIN — CLINDAMYCIN HYDROCHLORIDE SCH MG: 150 CAPSULE ORAL at 11:57

## 2020-08-15 RX ADMIN — Medication SCH MG: at 21:11

## 2020-08-15 RX ADMIN — CLINDAMYCIN HYDROCHLORIDE SCH MG: 150 CAPSULE ORAL at 17:27

## 2020-08-15 RX ADMIN — HYDROXYZINE PAMOATE SCH MG: 25 CAPSULE ORAL at 13:47

## 2020-08-15 RX ADMIN — CLINDAMYCIN HYDROCHLORIDE SCH MG: 150 CAPSULE ORAL at 23:25

## 2020-08-15 RX ADMIN — CLINDAMYCIN HYDROCHLORIDE SCH MG: 150 CAPSULE ORAL at 07:10

## 2020-08-15 RX ADMIN — HYDROCORTISONE PRN APPLIC: 0.5 CREAM TOPICAL at 10:05

## 2020-08-15 RX ADMIN — CLINDAMYCIN HYDROCHLORIDE SCH: 150 CAPSULE ORAL at 01:30

## 2020-08-15 RX ADMIN — BACITRACIN ZINC SCH GM: 500 OINTMENT TOPICAL at 10:04

## 2020-08-15 RX ADMIN — BICTEGRAVIR SODIUM, EMTRICITABINE, AND TENOFOVIR ALAFENAMIDE FUMARATE SCH EACH: 50; 200; 25 TABLET ORAL at 07:12

## 2020-08-15 RX ADMIN — NICOTINE SCH MG: 7 PATCH TRANSDERMAL at 10:04

## 2020-08-15 RX ADMIN — SULFAMETHOXAZOLE AND TRIMETHOPRIM SCH EACH: 800; 160 TABLET ORAL at 21:11

## 2020-08-15 RX ADMIN — HYDROXYZINE PAMOATE SCH MG: 25 CAPSULE ORAL at 21:11

## 2020-08-15 RX ADMIN — HYDROXYZINE PAMOATE SCH MG: 25 CAPSULE ORAL at 17:27

## 2020-08-15 RX ADMIN — SULFAMETHOXAZOLE AND TRIMETHOPRIM SCH EACH: 800; 160 TABLET ORAL at 10:03

## 2020-08-16 RX ADMIN — BICTEGRAVIR SODIUM, EMTRICITABINE, AND TENOFOVIR ALAFENAMIDE FUMARATE SCH EACH: 50; 200; 25 TABLET ORAL at 09:54

## 2020-08-16 RX ADMIN — Medication SCH TAB: at 09:53

## 2020-08-16 RX ADMIN — HYDROCORTISONE PRN APPLIC: 0.5 CREAM TOPICAL at 21:23

## 2020-08-16 RX ADMIN — SULFAMETHOXAZOLE AND TRIMETHOPRIM SCH EACH: 800; 160 TABLET ORAL at 21:22

## 2020-08-16 RX ADMIN — HYDROXYZINE PAMOATE SCH MG: 25 CAPSULE ORAL at 13:41

## 2020-08-16 RX ADMIN — Medication SCH MG: at 21:22

## 2020-08-16 RX ADMIN — HYDROXYZINE PAMOATE SCH MG: 25 CAPSULE ORAL at 21:22

## 2020-08-16 RX ADMIN — BACITRACIN ZINC SCH GM: 500 OINTMENT TOPICAL at 09:54

## 2020-08-16 RX ADMIN — CLINDAMYCIN HYDROCHLORIDE SCH MG: 150 CAPSULE ORAL at 12:30

## 2020-08-16 RX ADMIN — SULFAMETHOXAZOLE AND TRIMETHOPRIM SCH EACH: 800; 160 TABLET ORAL at 09:53

## 2020-08-16 RX ADMIN — HYDROXYZINE PAMOATE SCH MG: 25 CAPSULE ORAL at 06:16

## 2020-08-16 RX ADMIN — HYDROXYZINE PAMOATE SCH MG: 25 CAPSULE ORAL at 09:53

## 2020-08-16 RX ADMIN — CLINDAMYCIN HYDROCHLORIDE SCH MG: 150 CAPSULE ORAL at 06:16

## 2020-08-16 RX ADMIN — CLINDAMYCIN HYDROCHLORIDE SCH MG: 150 CAPSULE ORAL at 23:08

## 2020-08-16 RX ADMIN — CLINDAMYCIN HYDROCHLORIDE SCH MG: 150 CAPSULE ORAL at 17:52

## 2020-08-16 RX ADMIN — NICOTINE SCH MG: 7 PATCH TRANSDERMAL at 09:54

## 2020-08-16 RX ADMIN — HYDROXYZINE PAMOATE SCH MG: 25 CAPSULE ORAL at 17:52

## 2020-08-17 RX ADMIN — Medication SCH TAB: at 10:16

## 2020-08-17 RX ADMIN — BACITRACIN ZINC SCH GM: 500 OINTMENT TOPICAL at 12:34

## 2020-08-17 RX ADMIN — CLINDAMYCIN HYDROCHLORIDE SCH MG: 150 CAPSULE ORAL at 12:34

## 2020-08-17 RX ADMIN — SULFAMETHOXAZOLE AND TRIMETHOPRIM SCH EACH: 800; 160 TABLET ORAL at 21:33

## 2020-08-17 RX ADMIN — Medication SCH MG: at 21:33

## 2020-08-17 RX ADMIN — HYDROXYZINE PAMOATE SCH: 25 CAPSULE ORAL at 10:45

## 2020-08-17 RX ADMIN — CLINDAMYCIN HYDROCHLORIDE SCH MG: 150 CAPSULE ORAL at 17:36

## 2020-08-17 RX ADMIN — NICOTINE SCH MG: 7 PATCH TRANSDERMAL at 10:16

## 2020-08-17 RX ADMIN — HYDROXYZINE PAMOATE SCH MG: 25 CAPSULE ORAL at 17:36

## 2020-08-17 RX ADMIN — HYDROXYZINE PAMOATE SCH MG: 25 CAPSULE ORAL at 06:38

## 2020-08-17 RX ADMIN — ALBUTEROL SULFATE PRN PUFF: 90 AEROSOL, METERED RESPIRATORY (INHALATION) at 21:36

## 2020-08-17 RX ADMIN — CLINDAMYCIN HYDROCHLORIDE SCH MG: 150 CAPSULE ORAL at 23:45

## 2020-08-17 RX ADMIN — HYDROXYZINE PAMOATE SCH MG: 25 CAPSULE ORAL at 21:33

## 2020-08-17 RX ADMIN — HYDROXYZINE PAMOATE SCH MG: 25 CAPSULE ORAL at 14:22

## 2020-08-17 RX ADMIN — BICTEGRAVIR SODIUM, EMTRICITABINE, AND TENOFOVIR ALAFENAMIDE FUMARATE SCH EACH: 50; 200; 25 TABLET ORAL at 08:04

## 2020-08-17 RX ADMIN — SULFAMETHOXAZOLE AND TRIMETHOPRIM SCH EACH: 800; 160 TABLET ORAL at 10:16

## 2020-08-17 RX ADMIN — CLINDAMYCIN HYDROCHLORIDE SCH MG: 150 CAPSULE ORAL at 06:38

## 2020-08-18 RX ADMIN — Medication SCH MG: at 21:24

## 2020-08-18 RX ADMIN — CLINDAMYCIN HYDROCHLORIDE SCH: 150 CAPSULE ORAL at 08:09

## 2020-08-18 RX ADMIN — HYDROXYZINE PAMOATE SCH: 25 CAPSULE ORAL at 17:58

## 2020-08-18 RX ADMIN — HYDROXYZINE PAMOATE SCH: 25 CAPSULE ORAL at 14:36

## 2020-08-18 RX ADMIN — BICTEGRAVIR SODIUM, EMTRICITABINE, AND TENOFOVIR ALAFENAMIDE FUMARATE SCH EACH: 50; 200; 25 TABLET ORAL at 07:35

## 2020-08-18 RX ADMIN — SULFAMETHOXAZOLE AND TRIMETHOPRIM SCH EACH: 800; 160 TABLET ORAL at 10:27

## 2020-08-18 RX ADMIN — Medication SCH TAB: at 10:27

## 2020-08-18 RX ADMIN — HYDROXYZINE PAMOATE SCH MG: 25 CAPSULE ORAL at 06:23

## 2020-08-18 RX ADMIN — HYDROXYZINE PAMOATE SCH MG: 25 CAPSULE ORAL at 21:24

## 2020-08-18 RX ADMIN — BACITRACIN ZINC SCH GM: 500 OINTMENT TOPICAL at 10:27

## 2020-08-18 RX ADMIN — NICOTINE SCH MG: 7 PATCH TRANSDERMAL at 10:27

## 2020-08-18 RX ADMIN — HYDROXYZINE PAMOATE SCH MG: 25 CAPSULE ORAL at 10:27

## 2020-08-19 RX ADMIN — HYDROXYZINE PAMOATE SCH: 25 CAPSULE ORAL at 17:07

## 2020-08-19 RX ADMIN — HYDROXYZINE PAMOATE SCH MG: 25 CAPSULE ORAL at 06:54

## 2020-08-19 RX ADMIN — Medication SCH MG: at 21:20

## 2020-08-19 RX ADMIN — BACITRACIN ZINC SCH GM: 500 OINTMENT TOPICAL at 10:13

## 2020-08-19 RX ADMIN — HYDROXYZINE PAMOATE SCH: 25 CAPSULE ORAL at 13:30

## 2020-08-19 RX ADMIN — HYDROXYZINE PAMOATE SCH MG: 25 CAPSULE ORAL at 21:20

## 2020-08-19 RX ADMIN — NICOTINE SCH MG: 7 PATCH TRANSDERMAL at 10:12

## 2020-08-19 RX ADMIN — HYDROCORTISONE PRN APPLIC: 0.5 CREAM TOPICAL at 21:20

## 2020-08-19 RX ADMIN — HYDROXYZINE PAMOATE SCH MG: 25 CAPSULE ORAL at 10:13

## 2020-08-19 RX ADMIN — BICTEGRAVIR SODIUM, EMTRICITABINE, AND TENOFOVIR ALAFENAMIDE FUMARATE SCH EACH: 50; 200; 25 TABLET ORAL at 08:26

## 2020-08-19 RX ADMIN — ALBUTEROL SULFATE PRN PUFF: 90 AEROSOL, METERED RESPIRATORY (INHALATION) at 21:21

## 2020-08-19 RX ADMIN — Medication SCH TAB: at 10:13

## 2020-08-19 NOTE — PN
[facility] Progress Note


Note: 





Pt is a 56 y/o male admitted to rehab from [address] [place] [address]. Detox admission labs 

reviewed. Grossly abnormal CMP.


Patient reported before he came to detox, he was recycling cans & he injured 

left index finger. Patient  was on antibiotics- Bactrim Ds and Clindamycin tx  

but on  Bacitracin ointment at present.





                                        


                               Vital Signs - 24 hr











  08/18/20 08/19/20





  20:20 06:46


 


Temperature  97.5 F L


 


Pulse Rate  59 L


 


Respiratory  18





Rate  


 


Blood Pressure  107/70


 


O2 Sat by Pulse 96 96





Oximetry (%)  








Alert o x 3


nad


oob ambulating with steady gait with cane.


Left index finger wound is dry with healed tissue.





New rehab pt


Left index finger Paronychia





Repeat CMP in a.m


Antibiotics therapy completed

## 2020-08-20 LAB
ALBUMIN SERPL-MCNC: 3.2 G/DL (ref 3.4–5)
ALP SERPL-CCNC: 128 U/L (ref 45–117)
ALT SERPL-CCNC: 30 U/L (ref 13–61)
ANION GAP SERPL CALC-SCNC: 7 MMOL/L (ref 8–16)
AST SERPL-CCNC: 25 U/L (ref 15–37)
BILIRUB SERPL-MCNC: 0.6 MG/DL (ref 0.2–1)
BUN SERPL-MCNC: 17.1 MG/DL (ref 7–18)
CALCIUM SERPL-MCNC: 9 MG/DL (ref 8.5–10.1)
CHLORIDE SERPL-SCNC: 105 MMOL/L (ref 98–107)
CO2 SERPL-SCNC: 24 MMOL/L (ref 21–32)
CREAT SERPL-MCNC: 1.1 MG/DL (ref 0.55–1.3)
GLUCOSE SERPL-MCNC: 100 MG/DL (ref 74–106)
POTASSIUM SERPLBLD-SCNC: 4 MMOL/L (ref 3.5–5.1)
PROT SERPL-MCNC: 8.2 G/DL (ref 6.4–8.2)
SODIUM SERPL-SCNC: 137 MMOL/L (ref 136–145)

## 2020-08-20 RX ADMIN — Medication SCH MG: at 21:13

## 2020-08-20 RX ADMIN — HYDROXYZINE PAMOATE SCH: 25 CAPSULE ORAL at 21:14

## 2020-08-20 RX ADMIN — Medication SCH TAB: at 10:19

## 2020-08-20 RX ADMIN — BACITRACIN ZINC SCH GM: 500 OINTMENT TOPICAL at 10:19

## 2020-08-20 RX ADMIN — BICTEGRAVIR SODIUM, EMTRICITABINE, AND TENOFOVIR ALAFENAMIDE FUMARATE SCH EACH: 50; 200; 25 TABLET ORAL at 08:03

## 2020-08-20 RX ADMIN — HYDROXYZINE PAMOATE SCH MG: 25 CAPSULE ORAL at 10:19

## 2020-08-20 RX ADMIN — HYDROXYZINE PAMOATE SCH MG: 25 CAPSULE ORAL at 13:57

## 2020-08-20 RX ADMIN — NICOTINE SCH MG: 7 PATCH TRANSDERMAL at 10:19

## 2020-08-20 RX ADMIN — HYDROXYZINE PAMOATE SCH: 25 CAPSULE ORAL at 17:40

## 2020-08-20 RX ADMIN — HYDROXYZINE PAMOATE SCH MG: 25 CAPSULE ORAL at 06:05

## 2020-08-21 RX ADMIN — Medication SCH TAB: at 10:32

## 2020-08-21 RX ADMIN — HYDROXYZINE PAMOATE SCH MG: 25 CAPSULE ORAL at 17:35

## 2020-08-21 RX ADMIN — BICTEGRAVIR SODIUM, EMTRICITABINE, AND TENOFOVIR ALAFENAMIDE FUMARATE SCH EACH: 50; 200; 25 TABLET ORAL at 08:00

## 2020-08-21 RX ADMIN — HYDROXYZINE PAMOATE SCH MG: 25 CAPSULE ORAL at 22:01

## 2020-08-21 RX ADMIN — NICOTINE SCH MG: 7 PATCH TRANSDERMAL at 10:33

## 2020-08-21 RX ADMIN — ALBUTEROL SULFATE PRN PUFF: 90 AEROSOL, METERED RESPIRATORY (INHALATION) at 22:00

## 2020-08-21 RX ADMIN — HYDROXYZINE PAMOATE SCH MG: 25 CAPSULE ORAL at 06:43

## 2020-08-21 RX ADMIN — HYDROXYZINE PAMOATE SCH MG: 25 CAPSULE ORAL at 10:33

## 2020-08-21 RX ADMIN — BACITRACIN ZINC SCH GM: 500 OINTMENT TOPICAL at 10:33

## 2020-08-21 RX ADMIN — HYDROXYZINE PAMOATE SCH: 25 CAPSULE ORAL at 13:27

## 2020-08-21 RX ADMIN — Medication SCH MG: at 22:01

## 2020-08-21 RX ADMIN — HYDROCORTISONE PRN APPLIC: 0.5 CREAM TOPICAL at 22:00

## 2020-08-22 RX ADMIN — Medication SCH TAB: at 09:44

## 2020-08-22 RX ADMIN — HYDROXYZINE PAMOATE SCH MG: 25 CAPSULE ORAL at 13:37

## 2020-08-22 RX ADMIN — Medication SCH MG: at 21:17

## 2020-08-22 RX ADMIN — BICTEGRAVIR SODIUM, EMTRICITABINE, AND TENOFOVIR ALAFENAMIDE FUMARATE SCH EACH: 50; 200; 25 TABLET ORAL at 08:04

## 2020-08-22 RX ADMIN — HYDROXYZINE PAMOATE SCH MG: 25 CAPSULE ORAL at 09:44

## 2020-08-22 RX ADMIN — BACITRACIN ZINC SCH GM: 500 OINTMENT TOPICAL at 09:44

## 2020-08-22 RX ADMIN — HYDROXYZINE PAMOATE SCH MG: 25 CAPSULE ORAL at 06:18

## 2020-08-22 RX ADMIN — ALBUTEROL SULFATE PRN PUFF: 90 AEROSOL, METERED RESPIRATORY (INHALATION) at 21:17

## 2020-08-22 RX ADMIN — HYDROXYZINE PAMOATE SCH: 25 CAPSULE ORAL at 17:03

## 2020-08-22 RX ADMIN — NICOTINE SCH MG: 7 PATCH TRANSDERMAL at 09:45

## 2020-08-22 RX ADMIN — HYDROCORTISONE PRN APPLIC: 0.5 CREAM TOPICAL at 21:16

## 2020-08-22 RX ADMIN — HYDROXYZINE PAMOATE SCH MG: 25 CAPSULE ORAL at 21:17

## 2020-08-23 RX ADMIN — HYDROXYZINE PAMOATE SCH MG: 25 CAPSULE ORAL at 14:14

## 2020-08-23 RX ADMIN — Medication SCH MG: at 21:51

## 2020-08-23 RX ADMIN — NICOTINE SCH MG: 7 PATCH TRANSDERMAL at 09:34

## 2020-08-23 RX ADMIN — BACITRACIN ZINC SCH GM: 500 OINTMENT TOPICAL at 09:34

## 2020-08-23 RX ADMIN — HYDROXYZINE PAMOATE SCH MG: 25 CAPSULE ORAL at 21:51

## 2020-08-23 RX ADMIN — Medication SCH TAB: at 09:34

## 2020-08-23 RX ADMIN — BICTEGRAVIR SODIUM, EMTRICITABINE, AND TENOFOVIR ALAFENAMIDE FUMARATE SCH EACH: 50; 200; 25 TABLET ORAL at 08:07

## 2020-08-23 RX ADMIN — HYDROXYZINE PAMOATE SCH MG: 25 CAPSULE ORAL at 09:34

## 2020-08-23 RX ADMIN — HYDROXYZINE PAMOATE SCH MG: 25 CAPSULE ORAL at 06:29

## 2020-08-23 RX ADMIN — HYDROXYZINE PAMOATE SCH: 25 CAPSULE ORAL at 17:27

## 2020-08-24 RX ADMIN — HYDROXYZINE PAMOATE SCH: 25 CAPSULE ORAL at 09:38

## 2020-08-24 RX ADMIN — Medication SCH TAB: at 09:38

## 2020-08-24 RX ADMIN — HYDROXYZINE PAMOATE SCH: 25 CAPSULE ORAL at 14:10

## 2020-08-24 RX ADMIN — HYDROXYZINE PAMOATE SCH MG: 25 CAPSULE ORAL at 21:19

## 2020-08-24 RX ADMIN — NICOTINE SCH MG: 7 PATCH TRANSDERMAL at 09:38

## 2020-08-24 RX ADMIN — Medication SCH MG: at 21:19

## 2020-08-24 RX ADMIN — HYDROXYZINE PAMOATE SCH MG: 25 CAPSULE ORAL at 06:30

## 2020-08-24 RX ADMIN — HYDROXYZINE PAMOATE SCH: 25 CAPSULE ORAL at 17:56

## 2020-08-24 RX ADMIN — BICTEGRAVIR SODIUM, EMTRICITABINE, AND TENOFOVIR ALAFENAMIDE FUMARATE SCH EACH: 50; 200; 25 TABLET ORAL at 07:18

## 2020-08-24 RX ADMIN — BACITRACIN ZINC SCH GM: 500 OINTMENT TOPICAL at 14:15

## 2020-08-25 RX ADMIN — Medication SCH MG: at 21:08

## 2020-08-25 RX ADMIN — BACITRACIN ZINC SCH GM: 500 OINTMENT TOPICAL at 10:25

## 2020-08-25 RX ADMIN — Medication SCH TAB: at 10:26

## 2020-08-25 RX ADMIN — HYDROXYZINE PAMOATE SCH: 25 CAPSULE ORAL at 13:11

## 2020-08-25 RX ADMIN — HYDROXYZINE PAMOATE SCH: 25 CAPSULE ORAL at 18:06

## 2020-08-25 RX ADMIN — HYDROXYZINE PAMOATE SCH MG: 25 CAPSULE ORAL at 10:26

## 2020-08-25 RX ADMIN — BICTEGRAVIR SODIUM, EMTRICITABINE, AND TENOFOVIR ALAFENAMIDE FUMARATE SCH EACH: 50; 200; 25 TABLET ORAL at 07:05

## 2020-08-25 RX ADMIN — HYDROXYZINE PAMOATE SCH MG: 25 CAPSULE ORAL at 21:08

## 2020-08-25 RX ADMIN — NICOTINE SCH MG: 7 PATCH TRANSDERMAL at 10:25

## 2020-08-25 RX ADMIN — HYDROXYZINE PAMOATE SCH MG: 25 CAPSULE ORAL at 06:51

## 2020-08-26 RX ADMIN — HYDROXYZINE PAMOATE SCH MG: 25 CAPSULE ORAL at 21:11

## 2020-08-26 RX ADMIN — Medication SCH MG: at 21:11

## 2020-08-26 RX ADMIN — BACITRACIN ZINC SCH GM: 500 OINTMENT TOPICAL at 10:23

## 2020-08-26 RX ADMIN — HYDROXYZINE PAMOATE SCH MG: 25 CAPSULE ORAL at 06:24

## 2020-08-26 RX ADMIN — HYDROXYZINE PAMOATE SCH: 25 CAPSULE ORAL at 17:32

## 2020-08-26 RX ADMIN — BICTEGRAVIR SODIUM, EMTRICITABINE, AND TENOFOVIR ALAFENAMIDE FUMARATE SCH EACH: 50; 200; 25 TABLET ORAL at 07:09

## 2020-08-26 RX ADMIN — HYDROXYZINE PAMOATE SCH: 25 CAPSULE ORAL at 13:13

## 2020-08-26 RX ADMIN — NICOTINE SCH MG: 7 PATCH TRANSDERMAL at 10:23

## 2020-08-26 RX ADMIN — Medication SCH TAB: at 10:23

## 2020-08-26 RX ADMIN — HYDROXYZINE PAMOATE SCH MG: 25 CAPSULE ORAL at 10:23

## 2020-08-27 RX ADMIN — HYDROXYZINE PAMOATE SCH MG: 25 CAPSULE ORAL at 21:56

## 2020-08-27 RX ADMIN — Medication SCH MG: at 21:56

## 2020-08-27 RX ADMIN — HYDROXYZINE PAMOATE SCH MG: 25 CAPSULE ORAL at 06:37

## 2020-08-27 RX ADMIN — HYDROXYZINE PAMOATE SCH MG: 25 CAPSULE ORAL at 10:06

## 2020-08-27 RX ADMIN — HYDROXYZINE PAMOATE SCH: 25 CAPSULE ORAL at 13:01

## 2020-08-27 RX ADMIN — Medication SCH TAB: at 10:08

## 2020-08-27 RX ADMIN — BACITRACIN ZINC SCH GM: 500 OINTMENT TOPICAL at 10:09

## 2020-08-27 RX ADMIN — HYDROXYZINE PAMOATE SCH MG: 25 CAPSULE ORAL at 17:56

## 2020-08-27 RX ADMIN — NICOTINE SCH MG: 7 PATCH TRANSDERMAL at 10:09

## 2020-08-27 RX ADMIN — BICTEGRAVIR SODIUM, EMTRICITABINE, AND TENOFOVIR ALAFENAMIDE FUMARATE SCH EACH: 50; 200; 25 TABLET ORAL at 08:00

## 2020-08-28 RX ADMIN — HYDROXYZINE PAMOATE SCH MG: 25 CAPSULE ORAL at 09:58

## 2020-08-28 RX ADMIN — NICOTINE SCH MG: 7 PATCH TRANSDERMAL at 09:58

## 2020-08-28 RX ADMIN — HYDROXYZINE PAMOATE SCH MG: 25 CAPSULE ORAL at 06:28

## 2020-08-28 RX ADMIN — HYDROXYZINE PAMOATE SCH: 25 CAPSULE ORAL at 17:18

## 2020-08-28 RX ADMIN — BACITRACIN ZINC SCH: 500 OINTMENT TOPICAL at 09:59

## 2020-08-28 RX ADMIN — HYDROXYZINE PAMOATE SCH MG: 25 CAPSULE ORAL at 21:13

## 2020-08-28 RX ADMIN — BICTEGRAVIR SODIUM, EMTRICITABINE, AND TENOFOVIR ALAFENAMIDE FUMARATE SCH EACH: 50; 200; 25 TABLET ORAL at 07:28

## 2020-08-28 RX ADMIN — Medication SCH TAB: at 09:57

## 2020-08-28 RX ADMIN — HYDROXYZINE PAMOATE SCH: 25 CAPSULE ORAL at 13:01

## 2020-08-28 RX ADMIN — Medication SCH MG: at 21:13

## 2020-08-28 RX ADMIN — Medication SCH MG: at 21:14

## 2020-08-28 RX ADMIN — TETRAHYDROZOLINE HCL SCH DROP: 0.05 SOLUTION/ DROPS OPHTHALMIC at 22:49

## 2020-08-28 NOTE — PN
[facility] Progress Note


Note: 





Pt c/o right eye irritation and itch and was rubbing eye.


                               Vital Signs - 24 hr











  08/27/20





  20:06


 


O2 Sat by Pulse 96





Oximetry (%) 








                             Vital Signs (72 hours)











  08/25/20 08/26/20 08/26/20





  20:03 06:20 13:16


 


Temperature  97.3 F L 


 


Pulse Rate  70 


 


Respiratory  18 





Rate   


 


Blood Pressure  120/88 


 


O2 Sat by Pulse 95 96 97





Oximetry (%)   














  08/26/20 08/27/20 08/27/20





  20:08 05:31 14:57


 


Temperature  97.5 F L 


 


Pulse Rate  68 


 


Respiratory  18 





Rate   


 


Blood Pressure  103/74 


 


O2 Sat by Pulse 95 98 97





Oximetry (%)   














  08/27/20





  20:06


 


Temperature 


 


Pulse Rate 


 


Respiratory 





Rate 


 


Blood Pressure 


 


O2 Sat by Pulse 96





Oximetry (%) 








Alert o x 3


nad


oob ambulating with steady gait


Eyes:Perrla, eomi,slight redness to sclera on right eye. Left eye no redness.





A:Red Eye





P:Visine  1 gtt to affected right eye BID


Instructed pt to avoid rubbing eyes.

## 2020-08-29 RX ADMIN — TETRAHYDROZOLINE HCL SCH DROP: 0.05 SOLUTION/ DROPS OPHTHALMIC at 21:22

## 2020-08-29 RX ADMIN — HYDROXYZINE PAMOATE SCH: 25 CAPSULE ORAL at 09:41

## 2020-08-29 RX ADMIN — HYDROXYZINE PAMOATE SCH: 25 CAPSULE ORAL at 17:13

## 2020-08-29 RX ADMIN — HYDROXYZINE PAMOATE SCH: 25 CAPSULE ORAL at 13:05

## 2020-08-29 RX ADMIN — Medication SCH MG: at 21:22

## 2020-08-29 RX ADMIN — Medication SCH TAB: at 09:40

## 2020-08-29 RX ADMIN — HYDROXYZINE PAMOATE SCH MG: 25 CAPSULE ORAL at 21:22

## 2020-08-29 RX ADMIN — BACITRACIN ZINC SCH GM: 500 OINTMENT TOPICAL at 09:40

## 2020-08-29 RX ADMIN — NICOTINE SCH MG: 7 PATCH TRANSDERMAL at 09:40

## 2020-08-29 RX ADMIN — HYDROXYZINE PAMOATE SCH MG: 25 CAPSULE ORAL at 06:20

## 2020-08-29 RX ADMIN — TETRAHYDROZOLINE HCL SCH DROP: 0.05 SOLUTION/ DROPS OPHTHALMIC at 09:41

## 2020-08-29 RX ADMIN — BICTEGRAVIR SODIUM, EMTRICITABINE, AND TENOFOVIR ALAFENAMIDE FUMARATE SCH EACH: 50; 200; 25 TABLET ORAL at 07:50

## 2020-08-30 RX ADMIN — HYDROXYZINE PAMOATE SCH: 25 CAPSULE ORAL at 09:49

## 2020-08-30 RX ADMIN — BICTEGRAVIR SODIUM, EMTRICITABINE, AND TENOFOVIR ALAFENAMIDE FUMARATE SCH EACH: 50; 200; 25 TABLET ORAL at 09:49

## 2020-08-30 RX ADMIN — HYDROXYZINE PAMOATE SCH MG: 25 CAPSULE ORAL at 06:24

## 2020-08-30 RX ADMIN — TETRAHYDROZOLINE HCL SCH DROP: 0.05 SOLUTION/ DROPS OPHTHALMIC at 21:20

## 2020-08-30 RX ADMIN — Medication SCH MG: at 21:20

## 2020-08-30 RX ADMIN — Medication SCH MG: at 21:21

## 2020-08-30 RX ADMIN — NICOTINE SCH MG: 7 PATCH TRANSDERMAL at 09:49

## 2020-08-30 RX ADMIN — TETRAHYDROZOLINE HCL SCH DROP: 0.05 SOLUTION/ DROPS OPHTHALMIC at 09:48

## 2020-08-30 RX ADMIN — BACITRACIN ZINC SCH: 500 OINTMENT TOPICAL at 09:56

## 2020-08-30 RX ADMIN — HYDROXYZINE PAMOATE SCH: 25 CAPSULE ORAL at 17:48

## 2020-08-30 RX ADMIN — Medication SCH TAB: at 09:48

## 2020-08-30 RX ADMIN — HYDROXYZINE PAMOATE SCH MG: 25 CAPSULE ORAL at 21:20

## 2020-08-30 RX ADMIN — HYDROXYZINE PAMOATE SCH: 25 CAPSULE ORAL at 13:09

## 2020-08-31 RX ADMIN — HYDROXYZINE PAMOATE SCH MG: 25 CAPSULE ORAL at 13:21

## 2020-08-31 RX ADMIN — HYDROXYZINE PAMOATE SCH MG: 25 CAPSULE ORAL at 17:40

## 2020-08-31 RX ADMIN — Medication SCH MG: at 21:20

## 2020-08-31 RX ADMIN — BICTEGRAVIR SODIUM, EMTRICITABINE, AND TENOFOVIR ALAFENAMIDE FUMARATE SCH EACH: 50; 200; 25 TABLET ORAL at 08:27

## 2020-08-31 RX ADMIN — HYDROXYZINE PAMOATE SCH MG: 25 CAPSULE ORAL at 06:23

## 2020-08-31 RX ADMIN — TETRAHYDROZOLINE HCL SCH DROP: 0.05 SOLUTION/ DROPS OPHTHALMIC at 10:05

## 2020-08-31 RX ADMIN — NICOTINE SCH MG: 7 PATCH TRANSDERMAL at 10:04

## 2020-08-31 RX ADMIN — HYDROXYZINE PAMOATE SCH MG: 25 CAPSULE ORAL at 21:20

## 2020-08-31 RX ADMIN — BACITRACIN ZINC SCH GM: 500 OINTMENT TOPICAL at 10:04

## 2020-08-31 RX ADMIN — Medication SCH TAB: at 10:04

## 2020-08-31 RX ADMIN — HYDROXYZINE PAMOATE SCH MG: 25 CAPSULE ORAL at 10:05

## 2020-09-01 RX ADMIN — BACITRACIN ZINC SCH GM: 500 OINTMENT TOPICAL at 10:32

## 2020-09-01 RX ADMIN — HYDROXYZINE PAMOATE SCH MG: 25 CAPSULE ORAL at 06:35

## 2020-09-01 RX ADMIN — HYDROXYZINE PAMOATE SCH MG: 25 CAPSULE ORAL at 14:35

## 2020-09-01 RX ADMIN — BICTEGRAVIR SODIUM, EMTRICITABINE, AND TENOFOVIR ALAFENAMIDE FUMARATE SCH EACH: 50; 200; 25 TABLET ORAL at 08:15

## 2020-09-01 RX ADMIN — Medication SCH MG: at 21:14

## 2020-09-01 RX ADMIN — TETRAHYDROZOLINE HCL PRN DROP: 0.05 SOLUTION/ DROPS OPHTHALMIC at 21:15

## 2020-09-01 RX ADMIN — Medication SCH TAB: at 10:32

## 2020-09-01 RX ADMIN — HYDROXYZINE PAMOATE SCH MG: 25 CAPSULE ORAL at 21:14

## 2020-09-01 RX ADMIN — HYDROXYZINE PAMOATE SCH MG: 25 CAPSULE ORAL at 17:46

## 2020-09-01 RX ADMIN — HYDROXYZINE PAMOATE SCH MG: 25 CAPSULE ORAL at 10:32

## 2020-09-01 RX ADMIN — NICOTINE SCH MG: 7 PATCH TRANSDERMAL at 10:32

## 2020-09-02 RX ADMIN — HYDROXYZINE PAMOATE SCH MG: 25 CAPSULE ORAL at 18:46

## 2020-09-02 RX ADMIN — TETRAHYDROZOLINE HCL PRN DROP: 0.05 SOLUTION/ DROPS OPHTHALMIC at 06:30

## 2020-09-02 RX ADMIN — TETRAHYDROZOLINE HCL PRN DROP: 0.05 SOLUTION/ DROPS OPHTHALMIC at 21:06

## 2020-09-02 RX ADMIN — HYDROXYZINE PAMOATE SCH MG: 25 CAPSULE ORAL at 14:27

## 2020-09-02 RX ADMIN — Medication SCH MG: at 21:05

## 2020-09-02 RX ADMIN — BACITRACIN ZINC SCH GM: 500 OINTMENT TOPICAL at 09:54

## 2020-09-02 RX ADMIN — HYDROXYZINE PAMOATE SCH MG: 25 CAPSULE ORAL at 06:28

## 2020-09-02 RX ADMIN — NICOTINE SCH MG: 7 PATCH TRANSDERMAL at 09:54

## 2020-09-02 RX ADMIN — Medication SCH MG: at 21:06

## 2020-09-02 RX ADMIN — HYDROXYZINE PAMOATE SCH MG: 25 CAPSULE ORAL at 09:55

## 2020-09-02 RX ADMIN — BICTEGRAVIR SODIUM, EMTRICITABINE, AND TENOFOVIR ALAFENAMIDE FUMARATE SCH EACH: 50; 200; 25 TABLET ORAL at 07:05

## 2020-09-02 RX ADMIN — HYDROXYZINE PAMOATE SCH MG: 25 CAPSULE ORAL at 21:05

## 2020-09-02 RX ADMIN — Medication SCH TAB: at 09:54

## 2020-09-03 RX ADMIN — HYDROXYZINE PAMOATE SCH: 25 CAPSULE ORAL at 18:01

## 2020-09-03 RX ADMIN — HYDROXYZINE PAMOATE SCH MG: 25 CAPSULE ORAL at 21:06

## 2020-09-03 RX ADMIN — NICOTINE SCH MG: 7 PATCH TRANSDERMAL at 09:20

## 2020-09-03 RX ADMIN — Medication SCH MG: at 21:06

## 2020-09-03 RX ADMIN — HYDROXYZINE PAMOATE SCH MG: 25 CAPSULE ORAL at 09:20

## 2020-09-03 RX ADMIN — BACITRACIN ZINC SCH GM: 500 OINTMENT TOPICAL at 09:21

## 2020-09-03 RX ADMIN — HYDROXYZINE PAMOATE SCH MG: 25 CAPSULE ORAL at 06:19

## 2020-09-03 RX ADMIN — HYDROXYZINE PAMOATE SCH MG: 25 CAPSULE ORAL at 13:17

## 2020-09-03 RX ADMIN — Medication SCH TAB: at 09:20

## 2020-09-03 RX ADMIN — TETRAHYDROZOLINE HCL PRN DROP: 0.05 SOLUTION/ DROPS OPHTHALMIC at 21:07

## 2020-09-03 RX ADMIN — BICTEGRAVIR SODIUM, EMTRICITABINE, AND TENOFOVIR ALAFENAMIDE FUMARATE SCH EACH: 50; 200; 25 TABLET ORAL at 07:12

## 2020-09-03 RX ADMIN — TETRAHYDROZOLINE HCL PRN DROP: 0.05 SOLUTION/ DROPS OPHTHALMIC at 06:21

## 2020-09-04 RX ADMIN — HYDROXYZINE PAMOATE SCH MG: 25 CAPSULE ORAL at 21:14

## 2020-09-04 RX ADMIN — HYDROXYZINE PAMOATE SCH MG: 25 CAPSULE ORAL at 17:10

## 2020-09-04 RX ADMIN — TETRAHYDROZOLINE HCL PRN DROP: 0.05 SOLUTION/ DROPS OPHTHALMIC at 21:15

## 2020-09-04 RX ADMIN — NICOTINE SCH MG: 7 PATCH TRANSDERMAL at 09:24

## 2020-09-04 RX ADMIN — HYDROXYZINE PAMOATE SCH MG: 25 CAPSULE ORAL at 09:23

## 2020-09-04 RX ADMIN — HYDROXYZINE PAMOATE SCH MG: 25 CAPSULE ORAL at 06:20

## 2020-09-04 RX ADMIN — BACITRACIN ZINC SCH GM: 500 OINTMENT TOPICAL at 09:24

## 2020-09-04 RX ADMIN — HYDROXYZINE PAMOATE SCH: 25 CAPSULE ORAL at 13:07

## 2020-09-04 RX ADMIN — Medication SCH MG: at 21:14

## 2020-09-04 RX ADMIN — BICTEGRAVIR SODIUM, EMTRICITABINE, AND TENOFOVIR ALAFENAMIDE FUMARATE SCH EACH: 50; 200; 25 TABLET ORAL at 07:59

## 2020-09-04 RX ADMIN — Medication SCH TAB: at 09:23

## 2020-09-04 RX ADMIN — TETRAHYDROZOLINE HCL PRN DROP: 0.05 SOLUTION/ DROPS OPHTHALMIC at 06:22

## 2020-09-05 RX ADMIN — Medication SCH MG: at 21:16

## 2020-09-05 RX ADMIN — HYDROXYZINE PAMOATE SCH MG: 25 CAPSULE ORAL at 06:26

## 2020-09-05 RX ADMIN — HYDROXYZINE PAMOATE SCH MG: 25 CAPSULE ORAL at 21:16

## 2020-09-05 RX ADMIN — HYDROXYZINE PAMOATE SCH MG: 25 CAPSULE ORAL at 17:32

## 2020-09-05 RX ADMIN — HYDROXYZINE PAMOATE SCH MG: 25 CAPSULE ORAL at 14:31

## 2020-09-05 RX ADMIN — NICOTINE SCH MG: 7 PATCH TRANSDERMAL at 10:03

## 2020-09-05 RX ADMIN — BICTEGRAVIR SODIUM, EMTRICITABINE, AND TENOFOVIR ALAFENAMIDE FUMARATE SCH EACH: 50; 200; 25 TABLET ORAL at 07:54

## 2020-09-05 RX ADMIN — Medication SCH TAB: at 10:03

## 2020-09-05 RX ADMIN — TETRAHYDROZOLINE HCL PRN DROP: 0.05 SOLUTION/ DROPS OPHTHALMIC at 21:18

## 2020-09-05 RX ADMIN — BACITRACIN ZINC SCH GM: 500 OINTMENT TOPICAL at 10:03

## 2020-09-05 RX ADMIN — HYDROXYZINE PAMOATE SCH MG: 25 CAPSULE ORAL at 10:03

## 2020-09-05 RX ADMIN — TETRAHYDROZOLINE HCL PRN DROP: 0.05 SOLUTION/ DROPS OPHTHALMIC at 10:06

## 2020-09-06 RX ADMIN — BICTEGRAVIR SODIUM, EMTRICITABINE, AND TENOFOVIR ALAFENAMIDE FUMARATE SCH EACH: 50; 200; 25 TABLET ORAL at 08:06

## 2020-09-06 RX ADMIN — HYDROXYZINE PAMOATE SCH: 25 CAPSULE ORAL at 17:55

## 2020-09-06 RX ADMIN — HYDROXYZINE PAMOATE SCH MG: 25 CAPSULE ORAL at 21:37

## 2020-09-06 RX ADMIN — HYDROXYZINE PAMOATE SCH MG: 25 CAPSULE ORAL at 06:06

## 2020-09-06 RX ADMIN — Medication SCH MG: at 21:37

## 2020-09-06 RX ADMIN — NICOTINE SCH MG: 7 PATCH TRANSDERMAL at 10:04

## 2020-09-06 RX ADMIN — TETRAHYDROZOLINE HCL PRN DROP: 0.05 SOLUTION/ DROPS OPHTHALMIC at 21:37

## 2020-09-06 RX ADMIN — BACITRACIN ZINC SCH GM: 500 OINTMENT TOPICAL at 10:04

## 2020-09-06 RX ADMIN — TETRAHYDROZOLINE HCL PRN DROP: 0.05 SOLUTION/ DROPS OPHTHALMIC at 06:07

## 2020-09-06 RX ADMIN — Medication SCH TAB: at 10:04

## 2020-09-06 RX ADMIN — HYDROXYZINE PAMOATE SCH MG: 25 CAPSULE ORAL at 10:04

## 2020-09-06 RX ADMIN — HYDROXYZINE PAMOATE SCH MG: 25 CAPSULE ORAL at 13:09

## 2020-09-07 RX ADMIN — Medication SCH MG: at 21:23

## 2020-09-07 RX ADMIN — TETRAHYDROZOLINE HCL PRN DROP: 0.05 SOLUTION/ DROPS OPHTHALMIC at 06:26

## 2020-09-07 RX ADMIN — HYDROXYZINE PAMOATE SCH MG: 25 CAPSULE ORAL at 09:47

## 2020-09-07 RX ADMIN — BACITRACIN ZINC SCH GM: 500 OINTMENT TOPICAL at 09:48

## 2020-09-07 RX ADMIN — HYDROXYZINE PAMOATE SCH MG: 25 CAPSULE ORAL at 18:10

## 2020-09-07 RX ADMIN — Medication SCH TAB: at 09:48

## 2020-09-07 RX ADMIN — TETRAHYDROZOLINE HCL PRN DROP: 0.05 SOLUTION/ DROPS OPHTHALMIC at 21:23

## 2020-09-07 RX ADMIN — HYDROXYZINE PAMOATE SCH MG: 25 CAPSULE ORAL at 06:25

## 2020-09-07 RX ADMIN — HYDROXYZINE PAMOATE SCH: 25 CAPSULE ORAL at 14:29

## 2020-09-07 RX ADMIN — BICTEGRAVIR SODIUM, EMTRICITABINE, AND TENOFOVIR ALAFENAMIDE FUMARATE SCH EACH: 50; 200; 25 TABLET ORAL at 07:44

## 2020-09-07 RX ADMIN — HYDROXYZINE PAMOATE SCH MG: 25 CAPSULE ORAL at 21:23

## 2020-09-07 RX ADMIN — NICOTINE SCH MG: 7 PATCH TRANSDERMAL at 09:48

## 2020-09-08 RX ADMIN — BICTEGRAVIR SODIUM, EMTRICITABINE, AND TENOFOVIR ALAFENAMIDE FUMARATE SCH EACH: 50; 200; 25 TABLET ORAL at 07:17

## 2020-09-08 RX ADMIN — NICOTINE SCH MG: 7 PATCH TRANSDERMAL at 09:52

## 2020-09-08 RX ADMIN — Medication SCH MG: at 21:11

## 2020-09-08 RX ADMIN — Medication SCH TAB: at 09:52

## 2020-09-08 RX ADMIN — BACITRACIN ZINC SCH GM: 500 OINTMENT TOPICAL at 09:52

## 2020-09-08 RX ADMIN — HYDROXYZINE PAMOATE SCH MG: 25 CAPSULE ORAL at 21:11

## 2020-09-08 RX ADMIN — HYDROXYZINE PAMOATE SCH MG: 25 CAPSULE ORAL at 14:11

## 2020-09-08 RX ADMIN — TETRAHYDROZOLINE HCL PRN DROP: 0.05 SOLUTION/ DROPS OPHTHALMIC at 07:21

## 2020-09-08 RX ADMIN — HYDROXYZINE PAMOATE SCH MG: 25 CAPSULE ORAL at 07:17

## 2020-09-08 RX ADMIN — HYDROXYZINE PAMOATE SCH MG: 25 CAPSULE ORAL at 09:52

## 2020-09-08 RX ADMIN — HYDROXYZINE PAMOATE SCH: 25 CAPSULE ORAL at 18:50

## 2020-09-08 NOTE — PN
[facility] Progress Note


Note: 





Pt scheduled to discharge in the morning


Rpeat CMP  of detox lab grossly improved.


                                Laboratory Tests











  08/20/20





  08:00


 


Sodium  137


 


Potassium  4.0


 


Chloride  105


 


Carbon Dioxide  24


 


Anion Gap  7 L


 


BUN  17.1


 


Creatinine  1.1


 


Est GFR (CKD-EPI)AfAm  88.36


 


Est GFR (CKD-EPI)NonAf  76.24


 


Random Glucose  100


 


Calcium  9.0


 


Total Bilirubin  0.6


 


AST  25


 


ALT  30


 


Alkaline Phosphatase  128 H


 


Total Protein  8.2


 


Albumin  3.2 L

## 2020-09-09 VITALS — SYSTOLIC BLOOD PRESSURE: 105 MMHG | HEART RATE: 68 BPM | DIASTOLIC BLOOD PRESSURE: 76 MMHG | TEMPERATURE: 97.8 F

## 2020-09-09 RX ADMIN — HYDROXYZINE PAMOATE SCH MG: 25 CAPSULE ORAL at 07:08

## 2020-09-09 RX ADMIN — BICTEGRAVIR SODIUM, EMTRICITABINE, AND TENOFOVIR ALAFENAMIDE FUMARATE SCH EACH: 50; 200; 25 TABLET ORAL at 07:09

## 2020-09-09 RX ADMIN — TETRAHYDROZOLINE HCL PRN DROP: 0.05 SOLUTION/ DROPS OPHTHALMIC at 07:10

## 2020-09-09 NOTE — DS
[facility] Rehab Discharge Summary





- [facility] Rehab Discharge Summary


Admission Date: 08/12/20


Discharge Date: 09/09/20





- History


Present History: Alcohol dependence, Cannabis dependence, Cocaine dependence


Pertinent Past History: 


Asthma


Hx Anal cancer


HIV+(on Biktarvy)





- Discharge Physical Exam


Vital Signs: 


                                   Vital Signs











Temperature  97.8 F   09/09/20 06:04


 


Pulse Rate  68   09/09/20 06:04


 


Respiratory Rate  18   09/09/20 06:04


 


Blood Pressure  105/76   09/09/20 06:04


 


O2 Sat by Pulse Oximetry (%)  95   09/09/20 06:04








General:WDWN male, Alert o x 3


Cardiac:s1 s2, rrr


Lungs:ctab


abdomen:soft,+bs,nt,nd


MSK:Active FROM,all limbs;oob steady gait on ambulation


Skin:no edema;turgor wnl, intact.





Pertinent Admission Physical Exam Findings: 


s/p Detox


pt completed antibiotics treatment for left index finger paronychia in detox [facility] 

[facility] and currently was on bacitracin in rehab. Left finger wound has resolved. 





                                Laboratory Tests











  08/20/20





  08:00


 


Sodium  137


 


Potassium  4.0


 


Chloride  105


 


Carbon Dioxide  24


 


Anion Gap  7 L


 


BUN  17.1


 


Creatinine  1.1


 


Est GFR (CKD-EPI)AfAm  88.36


 


Est GFR (CKD-EPI)NonAf  76.24


 


Random Glucose  100


 


Calcium  9.0


 


Total Bilirubin  0.6


 


AST  25


 


ALT  30


 


Alkaline Phosphatase  128 H


 


Total Protein  8.2


 


Albumin  3.2 L














- Treatment


Discharge Condition: Discharge condition good, Rehabilitated safely, Responded 

well, Outpatient referral accepted


Hospital Course: 





Pt is a 54 y/o male who completed detox on [address] and was referred to [address] [place] [address] 

Rehab and discharging routinely today. Pt has a primary care provider who he 

reports he will make appointment to follow up with medical management after 

discharge. 


Pt accepted CD aftercare to [facility]  Outpt program in [place], [place].





- Medication


Discharge Medications: 


Ambulatory Orders





Bacitracin - [Bacitracin Topical Ointment -] 1 applic TP DAILY 08/12/20 


Hydrocortisone 0.5% Cream [Hytone 0.5% Cream -] 1 applic TP BID PRN 08/12/20 


Albuterol Sulfate Inhaler - [Ventolin HFA Inhaler -] 2 inh PO Q4H PRN #1 inhaler

09/08/20 


Bictegrav/Emtricit/Tenofov Ala [Biktarvy -25 mg Tablet] 1 each PO DAILY 

#30 tablet 09/08/20 











- Medication-Assisted Treatment (MAT)


Medication-Assisted Treatment (MAT): No





- Discharge Instructions


Diet, activity, other medical instructions: 





Diet:Regular





Activity:oob ad maricarmen





Other medical instructions:Follow up with  aftercare as scheduled with 

[facility] program.


Folow up with primary care with PCP Dr. Gonzalez @ [address] for medical management.








- Diagnosis


(1) Alcohol dependence


Status: Chronic   


Qualifiers: 


   Substance use status: uncomplicated   Qualified Code(s): F10.20 - Alcohol 

dependence, uncomplicated   





(2) Cannabis dependence


Status: Chronic   





(3) HIV (human immunodeficiency virus infection)


Status: Chronic   





(4) Paronychia of finger


Status: Acute   





(5) Asthma


Status: Chronic   


Qualifiers: 


   Asthma severity: unspecified severity   Asthma persistence: unspecified   

Asthma complication type: unspecified   Qualified Code(s): J45.909 - Unspecified

asthma, uncomplicated   





(6) Nicotine dependence


Status: Chronic   


Qualifiers: 


   Nicotine product type: cigarettes   Substance use status: uncomplicated   

Qualified Code(s): F17.210 - Nicotine dependence, cigarettes, uncomplicated   





- Follow-up Referral


Minutes to complete discharge: 30





- AMA


Did Patient Leave Against Medical Advice: No


Additional Comments: 





[facility] Rx for Biktarvy 1 tab po daily #30 electronically was sent to [place] 

pharmacy for pt . Pt reminded to call PCP for appointment for further 

management.

## 2020-10-04 ENCOUNTER — HOSPITAL ENCOUNTER (INPATIENT)
Dept: HOSPITAL 74 - YASAS | Age: 55
LOS: 5 days | Discharge: OTHER INSTITUTION | End: 2020-10-09
Attending: ALLERGY & IMMUNOLOGY | Admitting: ALLERGY & IMMUNOLOGY
Payer: COMMERCIAL

## 2020-10-04 VITALS — BODY MASS INDEX: 26.2 KG/M2

## 2020-10-04 DIAGNOSIS — Z21: ICD-10-CM

## 2020-10-04 DIAGNOSIS — F14.20: ICD-10-CM

## 2020-10-04 DIAGNOSIS — Z85.048: ICD-10-CM

## 2020-10-04 DIAGNOSIS — F17.210: ICD-10-CM

## 2020-10-04 DIAGNOSIS — F10.230: Primary | ICD-10-CM

## 2020-10-04 DIAGNOSIS — F12.20: ICD-10-CM

## 2020-10-04 DIAGNOSIS — J45.909: ICD-10-CM

## 2020-10-04 PROCEDURE — HZ2ZZZZ DETOXIFICATION SERVICES FOR SUBSTANCE ABUSE TREATMENT: ICD-10-PCS | Performed by: ALLERGY & IMMUNOLOGY

## 2020-10-04 PROCEDURE — U0003 INFECTIOUS AGENT DETECTION BY NUCLEIC ACID (DNA OR RNA); SEVERE ACUTE RESPIRATORY SYNDROME CORONAVIRUS 2 (SARS-COV-2) (CORONAVIRUS DISEASE [COVID-19]), AMPLIFIED PROBE TECHNIQUE, MAKING USE OF HIGH THROUGHPUT TECHNOLOGIES AS DESCRIBED BY CMS-2020-01-R: HCPCS

## 2020-10-04 RX ADMIN — Medication SCH MG: at 22:21

## 2020-10-04 NOTE — HP
CIWA Score


Nausea/Vomitin


Muscle Tremors: 2


Anxiety: 2


Agitation: 2


Paroxysmal Sweats: 2


Orientation: 0-Oriented


Tacttile Disturbances: 1-Very Mild Itch/Numbness


Auditory Disturbances: 0-None


Visual Disturbances: 1-Very Mild Sensitivity


Headache: 2-Mild


CIWA-Ar Total Score: 14





- Admission Criteria


[facility] Guidelines: Admission for Medically Managed Detox: 


Requires at least one of the followin. CIWA greater than 12


2. Seizures within the past 24 hours


3. Delirium tremens within the past 24 hours


4. Hallucinations within the past 24 hours


5. Acute intervention needed for co  occurring medical disorder


6. Acute intervention needed for co  occurring psychiatric disorder


7. Severe withdrawal that cannot be handled at a lower level of care (continued


    vomiting, continued diarrhea, abnormal vital signs) requiring intravenous


    medication and/or fluids


8. Pregnancy





Patient presents the following: CIWA greater than 12


Admission Criteria Met: Admission criteria met





Admitting History and Physical





- Past Medical History


CNS: Yes: Syncope


Pulmonary: Yes: Asthma


Gastrointestinal: Yes: Cancer (anal), Other (anal cancer surgery 0n 19)


Infectious Disease: Yes: HIV (since  non compliance no medication for 4 

months)


Psych: Yes: Addictions





- Smoking History


Smoking history: Current every day smoker


Have you smoked in the past 12 months: Yes


Aproximately how many cigarettes per day: 20





- Alcohol/Substance Use


Hx Alcohol Use: Yes


History of Substance Use: reports: Cocaine, Marijuana





- Social History


ADL: Support Services


Occupation: unemployed


History of Recent Travel: No





Admission ROS [facility]





- [facility]


Chief Complaint: 


I need help from drugs and alcohol


Allergies/Adverse Reactions: 


                                    Allergies











Allergy/AdvReac Type Severity Reaction Status Date / Time


 


No Known Allergies Allergy   Verified 10/04/20 12:35











History of Present Illness: 





Patient is a 55 years old man who presents for detox from alcohol. He reports 

blackout a long time ago, he denies seizures.


Exam Limitations: No Limitations





- Ebola screening


Have you traveled outside of the country in the last 21 days: No


Have you had contact with anyone from an Ebola affected area: No


Have you been sick,other than usual withdrawal symptoms: No


Do you have a fever: No





- Review of Systems


Constitutional: Chills, Loss of Appetite, Changes in sleep, Unintentional Wgt. 

Loss


EENT: reports: No Symptoms Reported


Respiratory: reports: Cough (r/t smoking)


Cardiac: reports: No Symptoms Reported


GI: reports: Nausea, Poor Fluid Intake, Abdominal cramping


: reports: No Symptoms Reported


Integumentary: reports: Sweating


Neuro: reports: Headache, Numbness, Tremors


Endocrine: reports: No Symptoms Reported


Hematology: reports: No Symptoms Reported


Psychiatric: reports: No Sypmtoms Reported


Other Systems: Reviewed and Negative





Patient History





- Patient Medical History


Hx Anemia: No


Hx Asthma: Yes


Hx Chronic Obstructive Pulmonary Disease (COPD): No


Hx Cancer: Yes (anal, resolved)


Hx Cardiac Disorders: No


Hx Congestive Heart Failure: No


Hx Hypertension: No


Hx Hypercholesterolemia: No


Hx Pacemaker: No


HX Cerebrovascular Accident: No


Hx Seizures: No


Hx Dementia: No


Hx Diabetes: No


Hx Gastrointestinal Disorders: No


Hx Liver Disease: No


Hx Genitourinary Disorders: No


Hx Sexually Transmitted Disorders: Yes (HIV+)


Hx Renal Disease (ESRD): No


Hx Thyroid Disease: No


Hx Human Immunodeficiency Virus (HIV): Yes (since )


Hx Hepatitis C: No


Hx Depression: No


Hx Suicide Attempt: No


Hx Bipolar Disorder: No


Hx Schizophrenia: No





- Patient Surgical History


Past Surgical History: Yes


Hx Neurologic Surgery: No


Hx Cataract Extraction: No


Hx Cardiac Surgery: No


Hx Lung Surgery: No


Hx Breast Surgery: No


Hx Breast Biopsy: No


Hx Abdominal Surgery: No


Hx Appendectomy: No


Hx Cholecystectomy: No


Hx Genitourinary Surgery: No


Hx  Section: No


Hx Orthopedic Surgery: No


Other Surgical History: removal of anal cancer on 19 and 19 at 

[facility]


Anesthesia Reaction: No





- PPD History


Previous Implant?: Yes


Documented Results: Negative w/proof


Implanted On Prior [facility] Admission?: Yes


Date: 02/10/20


Results: neg


PPD to be Administered?: No





- Smoking Cessation


Smoking history: Current every day smoker


Have you smoked in the past 12 months: Yes


Aproximately how many cigarettes per day: 20


Cigars Per Day: 0


Hx Chewing Tobacco Use: No


Initiated information on smoking cessation: Yes


'Breaking Loose' booklet given: 10/04/20





Admission Physical Exam [facility]





- Vital Signs


Vital Signs: 


                               Vital Signs - 24 hr











  10/04/20





  12:37


 


Temperature 97.2 F L


 


Pulse Rate 72


 


Respiratory 20





Rate 


 


Blood Pressure 123/79














- Physical


General Appearance: Yes: No Apparent Distress


HEENTM: Yes: Hearing grossly Normal, Normal Voice, Pharynx Normal, Other 

(edentulous)


Respiratory: Yes: Chest Non-Tender, Lungs Clear, Normal Breath Sounds, No 

Respiratory Distress, No Accessory Muscle Use


Neck: Yes: No masses,lesions,Nodules, Supple


Breast: Yes: Breast Exam Deferred


Cardiology: Yes: Regular Rhythm, Regular Rate, S1, S2


Abdominal: Yes: Normal Bowel Sounds, Non Tender, Soft


Genitourinary: Yes: Within Normal Limits


Back: Yes: Normal Inspection


Musculoskeletal: Yes: Muscle Pain, Muscle weakness


Extremities: Yes: Non-Tender, Tremors


Neurological: Yes: CNs II-XII NML intact, Fully Oriented, Alert, Normal 

Mood/Affect, Normal Response


Integumentary: Yes: Cold


Lymphatic: Yes: Within Normal Limits





- Diagnostic


(1) Nicotine dependence with withdrawal


Current Visit: Yes   Status: Acute   





(2) Alcohol dependence with withdrawal, uncomplicated


Current Visit: Yes   Status: Acute   





(3) HIV (human immunodeficiency virus infection)


Current Visit: Yes   Status: Chronic   


Qualifiers: 


   HIV symptom status: asymptomatic   Qualified Code(s): Z21 - Asymptomatic h

uman immunodeficiency virus [HIV] infection status   





(4) Cocaine abuse


Current Visit: Yes   Status: Chronic   





(5) Asthma


Current Visit: Yes   Status: Chronic   


Qualifiers: 


   Asthma severity: mild   Asthma persistence: intermittent   Asthma 

complication type: unspecified   Qualified Code(s): J45.20 - Mild intermittent 

asthma, uncomplicated   





(6) Cannabis dependence


Current Visit: Yes   Status: Chronic   





Cleared for Admission S





- Detox or Rehab


[facility] Level of Care: Medically Managed


Detox Regimen/Protocol: Librium


Claeared for Rehab Admission: No





Breathalyzer





- Breathalyzer


Breathalyzer: 0.007





Urine Drug Screen





- Test Device


Lot number: [id number]


Expiration date: 22





- Control


Is test valid?: No





- Results


Drug screen NEGATIVE: No


Urine drug screen results: THC-Marijuana, SHONNA-Cocaine





Inpatient Rehab Admission





- Rehab Decision to Admit


Inpatient rehab admission?: No

## 2020-10-04 NOTE — XMS
Summarization Of Episode

                           Created on:2020



Patient:ODESSA DIAZ

Sex:Male

:1965

External Reference #:[id number]





Demographics







                          Address                   [address] [address]

 

                          Home Phone                [phone]

 

                          Email Address             N

 

                          Preferred Language        English

 

                          Marital Status            Not  or 

 

                          Yarsani Affiliation     NO

 

                          Race                      BL

 

                          Ethnic Group              Not  or 









Author







                          Organization              [facility]









Support







                Name            Relationship    Address         Phone

 

                UE              Unavailable     Unavailable     Unavailable

 

                PARESH DIAZ          [phone] [phone]



                                                [place] [address] 

 

                PARESH DIAZ          [phone] Unavailable



                                                [place] [address] 









Re-disclosure Warning

The records that you are about to access may contain information from federally-
assisted alcohol or drug abuse programs. If such information is present, then 
the following federally mandated warning applies: This information has been 
disclosed to you from records protected by federal confidentiality rules (42 CFR
part 2). The federal rules prohibit you from making any further disclosure of 
this information unless further disclosure is expressly permitted by the written
consent of the person to whom it pertains or as otherwise permitted by 42 CFR 
part 2. A general authorization for the release of medical or other information 
is NOT sufficient for this purpose. The Federal rules restrict any use of the 
information to criminally investigate or prosecute any alcohol or drug abuse 
patient.The records that you are about to access may contain highly sensitive 
health information, the redisclosure of which is protected by Article 27-F of 
the [facility] Public Health law. If you continue you may haveaccess to 
information: Regarding HIV / AIDS; Provided by facilities licensed or operated 
by the [facility] Office of Mental Health; or Provided by the [facility]
Office for People With Developmental Disabilities. If such information is 
present, then the following [place] mandated warning applies: This 
information has been disclosed to you from confidential records which are 
protected by state law. State law prohibits you from making any further 
disclosure of this information without the specific written consent of the 
person to whom it pertains, or as otherwise permitted by law. Any unauthorized 
further disclosure in violation of state law may result in a fine or halfway 
sentence or both. A general authorization for the release of medical or other 
information is NOT sufficient authorization for further disclosure.



Insurance Providers







          Payer name Policy type Policy ID Covered   Covered party's Policy    P

halle



                    / Coverage           party ID  relationship to Tyler    Inf

ormation



                    type                          tyler              

 

          MEDICAID            [id number]            SP                  [id number]







Results







                    ID                  Date                Data Source

 

                    [id number]         2020 12:00:00 PM EDT [facility]











          Name      Value     Range     Interpretation Description Data      Sup

porting



                                        Code                Source(s) Document(s

)

 

          SARS                                              [facility]   



          coronavirus 2                                                   



          RNA                                                         









                                        This lab was ordered by [facility]

ct Bill Inter and reported by [facility].











                                        Procedure

## 2020-10-04 NOTE — XMS
Summarization Of Episode

                           Created on:2020



Patient:ODESSA DIAZ

Sex:Male

:1965

External Reference #:[id number]





Demographics







                          Address                   [address] [address]

 

                          Home Phone                [phone]

 

                          Email Address             N

 

                          Preferred Language        English

 

                          Marital Status            Not  or 

 

                          Zoroastrian Affiliation     NO

 

                          Race                      BL

 

                          Ethnic Group              Not  or 









Author







                          Organization              [facility]









Support







                Name            Relationship    Address         Phone

 

                UE              Unavailable     Unavailable     Unavailable

 

                PARESH DIAZ          [id number] [phone]



                                                [place] [address] 

 

                PARESH DIAZ          [phone] Unavailable



                                                [place] [address] 









Re-disclosure Warning




Insurance Providers







          Payer name Policy type Policy ID Covered   Covered party's Policy    P

halle



                    / Coverage           party ID  relationship to Tyler    Inf

ormation



                    type                          tyler              

 

          MEDICAID            [id number]            SP                  [id number]







Results







                    ID                  Date                Data Source

 

                    [id number]         2020 12:00:00 PM EDT [facility]











          Name      Value     Range     Interpretation Description Data      Sup

porting



                                        Code                Source(s) Document(s

)

 

          SARS                                              [facility]   



          coronavirus 2                                                   



          RNA                                                         









                                        This lab was ordered by [facility]

ct Bill Inter and reported by [facility].











                                        Procedure

## 2020-10-04 NOTE — BHS.RME
Substance Use & Tx History





- Substance Use History


  ** Alcohol


Substance amount: 2 cases, 2 L


Frequency of use: Daily


Substance route: Oral


Date of Last Use: 10/04/20





  ** Cocaine-Crack


Substance amount: 1/2 pound


Frequency of use: Daily


Substance route: Smoking


Date of Last Use: 10/03/20





  ** Marijuana/Hashish


Substance amount: 1/2 oz


Frequency of use: Daily


Substance route: Smoking


Date of Last Use: 10/03/20





- Last Treatment


Date of last treatment: 20-20


Where was last treatment: Detox (& rehab)





Physical/Psych/Mental Status





- Behavior


General Behavior: Increased activity (restlessness, agitation)


Eye Contact: Normal


Other Behaviors: Mannerisms





- Cooperativeness


Cooperativeness: Cooperative





- Thinking


Thought Processes: Logical


Thought content: Future oriented





- Physical Health Problems


Is patient presently having any pain?: Yes


Does patient presently have any injuries (include location): No


Does patient currently have a fever: No





CIWA


Nausea/Vomitin


Muscle Tremors: 2


Anxiety: 2


Agitation: 2


Paroxysmal Sweats: 2


Orientation: 0-Oriented


Tacttile Disturbances: 1-Very Mild Itch/Numbness


Auditory Disturbances: 0-None


Visual Disturbances: 1-Very Mild Sensitivity


Headache: 2-Mild


CIWA-Ar Total Score: 14

## 2020-10-05 LAB
ALBUMIN SERPL-MCNC: 3.1 G/DL (ref 3.4–5)
ALP SERPL-CCNC: 129 U/L (ref 45–117)
ALT SERPL-CCNC: 30 U/L (ref 13–61)
ANION GAP SERPL CALC-SCNC: 4 MMOL/L (ref 8–16)
AST SERPL-CCNC: 30 U/L (ref 15–37)
BILIRUB SERPL-MCNC: 0.6 MG/DL (ref 0.2–1)
BUN SERPL-MCNC: 11.4 MG/DL (ref 7–18)
CALCIUM SERPL-MCNC: 8.4 MG/DL (ref 8.5–10.1)
CHLORIDE SERPL-SCNC: 107 MMOL/L (ref 98–107)
CO2 SERPL-SCNC: 29 MMOL/L (ref 21–32)
CREAT SERPL-MCNC: 0.9 MG/DL (ref 0.55–1.3)
DEPRECATED RDW RBC AUTO: 13.6 % (ref 11.9–15.9)
GLUCOSE SERPL-MCNC: 89 MG/DL (ref 74–106)
HCT VFR BLD CALC: 39.9 % (ref 35.4–49)
HGB BLD-MCNC: 13.2 GM/DL (ref 11.7–16.9)
MCH RBC QN AUTO: 28.6 PG (ref 25.7–33.7)
MCHC RBC AUTO-ENTMCNC: 33.1 G/DL (ref 32–35.9)
MCV RBC: 86.6 FL (ref 80–96)
PLATELET # BLD AUTO: 170 K/MM3 (ref 134–434)
PMV BLD: 10.6 FL (ref 7.5–11.1)
POTASSIUM SERPLBLD-SCNC: 3.9 MMOL/L (ref 3.5–5.1)
PROT SERPL-MCNC: 7.4 G/DL (ref 6.4–8.2)
RBC # BLD AUTO: 4.61 M/MM3 (ref 4–5.6)
SODIUM SERPL-SCNC: 140 MMOL/L (ref 136–145)
WBC # BLD AUTO: 3.1 K/MM3 (ref 4–10)

## 2020-10-05 RX ADMIN — Medication SCH MG: at 22:10

## 2020-10-05 RX ADMIN — NICOTINE SCH: 7 PATCH TRANSDERMAL at 11:01

## 2020-10-05 RX ADMIN — BICTEGRAVIR SODIUM, EMTRICITABINE, AND TENOFOVIR ALAFENAMIDE FUMARATE SCH EACH: 50; 200; 25 TABLET ORAL at 11:01

## 2020-10-05 RX ADMIN — Medication SCH TAB: at 11:00

## 2020-10-05 NOTE — PN
S CIWA





- CIWA Score


Nausea/Vomitin-Mild Nausea/No Vomiting


Muscle Tremors: 3


Anxiety: 3


Agitation: 1-Slight > Activity


Paroxysmal Sweats: No Perspiration


Orientation: 0-Oriented


Tacttile Disturbances: 0-None


Auditory Disturbances: 0-None


Visual Disturbances: 1-Very Mild Sensitivity


Headache: 2-Mild


CIWA-Ar Total Score: 11





BHS Progress Note (SOAP)


Subjective: 





55 years old male was admitted on 10/04/20 for alcohol withdrawal sx management 

treating with librium detox regiment


"doing fine" limited conversation with staff prefers to resting in the bed 


Objective: 





10/05/20 10:28


                               Vital Signs - 24 hr











  10/04/20 10/04/20 10/04/20





  12:37 13:40 16:45


 


Temperature 97.2 F L 97.1 F L 98.4 F


 


Pulse Rate 72 61 83


 


Respiratory 20 18 16





Rate   


 


Blood Pressure 123/79 127/82 126/83


 


O2 Sat by Pulse  96 





Oximetry (%)   














  10/04/20 10/05/20 10/05/20





  20:28 06:36 09:23


 


Temperature 97.4 F L 97.3 F L 97.1 F L


 


Pulse Rate 71 60 55 L


 


Respiratory 16 18 18





Rate   


 


Blood Pressure 120/83 106/67 93/59 L


 


O2 Sat by Pulse 98 99 





Oximetry (%)   











10/05/20 10:28


lab pending


Assessment: 





10/05/20 10:29


alcohol withdrawal 


Plan: 





librium regiment

## 2020-10-06 RX ADMIN — NICOTINE SCH: 7 PATCH TRANSDERMAL at 10:05

## 2020-10-06 RX ADMIN — Medication SCH MG: at 22:39

## 2020-10-06 RX ADMIN — BICTEGRAVIR SODIUM, EMTRICITABINE, AND TENOFOVIR ALAFENAMIDE FUMARATE SCH EACH: 50; 200; 25 TABLET ORAL at 10:04

## 2020-10-06 RX ADMIN — Medication SCH TAB: at 10:05

## 2020-10-06 NOTE — PN
S CIWA





- CIWA Score


Nausea/Vomitin-Mild Nausea/No Vomiting


Muscle Tremors: 1-None Visible, but Felt


Anxiety: 1-Mildly Anxious


Agitation: 1-Slight > Activity


Paroxysmal Sweats: No Perspiration


Orientation: 0-Oriented


Tacttile Disturbances: 1-Very Mild Itch/Numbness


Auditory Disturbances: 0-None


Visual Disturbances: 2-Mild Sensitivity


Headache: 2-Mild


CIWA-Ar Total Score: 9





BHS Progress Note (SOAP)


Subjective: 





55 years old male was admitted on 10/04/20 for alcohol withdrawal sx management


treating with librium detox regiment 


ate breakfast in room resting in bed encourage hygiene and discussing aftercare 

with staff 


mr hyatt agrees to consider AA and support networking 


Objective: 





10/06/20 10:05


                                   Vital Signs











Temperature  97.1 F L  10/06/20 09:01


 


Pulse Rate  60   10/06/20 09:01


 


Respiratory Rate  18   10/06/20 09:01


 


Blood Pressure  113/78   10/06/20 09:01


 


O2 Sat by Pulse Oximetry (%)  98   10/06/20 06:29








                               Vital Signs - 24 hr











  10/05/20 10/05/20 10/05/20





  12:42 16:52 20:56


 


Temperature 97.1 F L 97.7 F 97.1 F L


 


Pulse Rate 63 68 70


 


Respiratory 18 16 17





Rate   


 


Blood Pressure 115/80 111/65 130/93


 


O2 Sat by Pulse 99 99 97





Oximetry (%)   














  10/06/20 10/06/20





  06:29 09:01


 


Temperature 97.7 F 97.1 F L


 


Pulse Rate 63 60


 


Respiratory 18 18





Rate  


 


Blood Pressure 101/60 113/78


 


O2 Sat by Pulse 98 





Oximetry (%)  








                                Laboratory Tests











  10/04/20 10/05/20 10/05/20





  13:00 07:30 07:30


 


WBC    3.1 L


 


RBC    4.61


 


Hgb    13.2


 


Hct    39.9


 


MCV    86.6


 


MCH    28.6


 


MCHC    33.1


 


RDW    13.6  D


 


Plt Count    170


 


MPV    10.6


 


Sodium   


 


Potassium   


 


Chloride   


 


Carbon Dioxide   


 


Anion Gap   


 


BUN   


 


Creatinine   


 


Est GFR (CKD-EPI)AfAm   


 


Est GFR (CKD-EPI)NonAf   


 


Random Glucose   


 


Calcium   


 


Total Bilirubin   


 


AST   


 


ALT   


 


Alkaline Phosphatase   


 


Total Protein   


 


Albumin   


 


Syphilis Serology   Non-reactive 


 


COVID-19 (IRMA)  Not detected  














  10/05/20





  07:30


 


WBC 


 


RBC 


 


Hgb 


 


Hct 


 


MCV 


 


MCH 


 


MCHC 


 


RDW 


 


Plt Count 


 


MPV 


 


Sodium  140


 


Potassium  3.9


 


Chloride  107


 


Carbon Dioxide  29


 


Anion Gap  4 L


 


BUN  11.4


 


Creatinine  0.9


 


Est GFR (CKD-EPI)AfAm  112.62


 


Est GFR (CKD-EPI)NonAf  97.17


 


Random Glucose  89


 


Calcium  8.4 L


 


Total Bilirubin  0.6


 


AST  30


 


ALT  30


 


Alkaline Phosphatase  129 H


 


Total Protein  7.4


 


Albumin  3.1 L


 


Syphilis Serology 


 


COVID-19 (IRMA) 








lab noted


Assessment: 





10/06/20 10:06


alcohol withdrawal 


Plan: 





librium regiment

## 2020-10-07 RX ADMIN — Medication SCH TAB: at 10:06

## 2020-10-07 RX ADMIN — BICTEGRAVIR SODIUM, EMTRICITABINE, AND TENOFOVIR ALAFENAMIDE FUMARATE SCH: 50; 200; 25 TABLET ORAL at 08:17

## 2020-10-07 RX ADMIN — Medication SCH MG: at 22:44

## 2020-10-07 RX ADMIN — NICOTINE SCH: 7 PATCH TRANSDERMAL at 10:06

## 2020-10-07 NOTE — PN
[facility] CIWA





- CIWA Score


Nausea/Vomitin-No Nausea/No Vomiting


Muscle Tremors: 2


Anxiety: 2


Agitation: 2


Paroxysmal Sweats: No Perspiration


Orientation: 0-Oriented


Tacttile Disturbances: 0-None


Auditory Disturbances: 0-None


Visual Disturbances: 0-None


Headache: 1-Very Mild


CIWA-Ar Total Score: 7





BHS Progress Note (SOAP)


Subjective: 





alert,irritable,anxious,interrupted sleep,aching pain


Objective: 





10/07/20 16:08


                                   Vital Signs











Temperature  97.5 F L  10/07/20 12:54


 


Pulse Rate  72   10/07/20 12:54


 


Respiratory Rate  16   10/07/20 12:54


 


Blood Pressure  103/77   10/07/20 12:54


 


O2 Sat by Pulse Oximetry (%)  100   10/07/20 12:54











10/07/20 16:08


                             Laboratory Last Values











WBC  3.1 K/mm3 (4.0-10.0)  L  10/05/20  07:30    


 


RBC  4.61 M/mm3 (4.00-5.60)   10/05/20  07:30    


 


Hgb  13.2 GM/dL (11.7-16.9)   10/05/20  07:30    


 


Hct  39.9 % (35.4-49)   10/05/20  07:30    


 


MCV  86.6 fl (80-96)   10/05/20  07:30    


 


MCH  28.6 pg (25.7-33.7)   10/05/20  07:30    


 


MCHC  33.1 g/dl (32.0-35.9)   10/05/20  07:30    


 


RDW  13.6 % (11.9-15.9)  D 10/05/20  07:30    


 


Plt Count  170 K/MM3 (134-434)   10/05/20  07:30    


 


MPV  10.6 fl (7.5-11.1)   10/05/20  07:30    


 


Sodium  140 mmol/L (136-145)   10/05/20  07:30    


 


Potassium  3.9 mmol/L (3.5-5.1)   10/05/20  07:30    


 


Chloride  107 mmol/L ()   10/05/20  07:30    


 


Carbon Dioxide  29 mmol/L (21-32)   10/05/20  07:30    


 


Anion Gap  4 MMOL/L (8-16)  L  10/05/20  07:30    


 


BUN  11.4 mg/dL (7-18)   10/05/20  07:30    


 


Creatinine  0.9 mg/dL (0.55-1.3)   10/05/20  07:30    


 


Est GFR (CKD-EPI)AfAm  112.62   10/05/20  07:30    


 


Est GFR (CKD-EPI)NonAf  97.17   10/05/20  07:30    


 


Random Glucose  89 mg/dL ()   10/05/20  07:30    


 


Calcium  8.4 mg/dL (8.5-10.1)  L  10/05/20  07:30    


 


Total Bilirubin  0.6 mg/dL (0.2-1)   10/05/20  07:30    


 


AST  30 U/L (15-37)   10/05/20  07:30    


 


ALT  30 U/L (13-61)   10/05/20  07:30    


 


Alkaline Phosphatase  129 U/L ()  H  10/05/20  07:30    


 


Total Protein  7.4 g/dl (6.4-8.2)   10/05/20  07:30    


 


Albumin  3.1 g/dl (3.4-5.0)  L  10/05/20  07:30    


 


Syphilis Serology  Non-reactive  (NONREACTIVE)   10/05/20  07:30    


 


COVID-19 (IRMA)  Not detected  (Not Detected)   10/04/20  13:00    











Assessment: 





10/07/20 16:09


withdrawal symptom


Plan: 





continue detox librium regimen,leukopenia wbc 3,100 probably due to chronic 

alcoholism and hiv,advise abstinence from alcohol,nutritional support

## 2020-10-08 RX ADMIN — Medication SCH MG: at 22:20

## 2020-10-08 RX ADMIN — NICOTINE SCH: 7 PATCH TRANSDERMAL at 10:36

## 2020-10-08 RX ADMIN — Medication SCH TAB: at 10:36

## 2020-10-08 RX ADMIN — BICTEGRAVIR SODIUM, EMTRICITABINE, AND TENOFOVIR ALAFENAMIDE FUMARATE SCH EACH: 50; 200; 25 TABLET ORAL at 10:36

## 2020-10-08 RX ADMIN — Medication SCH MG: at 22:19

## 2020-10-08 NOTE — PN
S CIWA





- CIWA Score


Nausea/Vomitin-Mild Nausea/No Vomiting


Muscle Tremors: 2


Anxiety: 2


Agitation: 1-Slight > Activity


Paroxysmal Sweats: No Perspiration


Orientation: 0-Oriented


Tacttile Disturbances: 1-Very Mild Itch/Numbness


Auditory Disturbances: 0-None


Visual Disturbances: 0-None


Headache: 1-Very Mild


CIWA-Ar Total Score: 8





BHS Progress Note (SOAP)


Subjective: 





alert,irritable,anxious,interrupted sleep,aching pain,nausea,ambulation on the 

unit


Objective: 





10/08/20 16:58


                                   Vital Signs











Temperature  97.8 F   10/08/20 13:15


 


Pulse Rate  69   10/08/20 13:15


 


Respiratory Rate  18   10/08/20 13:15


 


Blood Pressure  107/70   10/08/20 13:15


 


O2 Sat by Pulse Oximetry (%)  98   10/08/20 13:15











10/08/20 16:59


                             Laboratory Last Values











WBC  3.1 K/mm3 (4.0-10.0)  L  10/05/20  07:30    


 


RBC  4.61 M/mm3 (4.00-5.60)   10/05/20  07:30    


 


Hgb  13.2 GM/dL (11.7-16.9)   10/05/20  07:30    


 


Hct  39.9 % (35.4-49)   10/05/20  07:30    


 


MCV  86.6 fl (80-96)   10/05/20  07:30    


 


MCH  28.6 pg (25.7-33.7)   10/05/20  07:30    


 


MCHC  33.1 g/dl (32.0-35.9)   10/05/20  07:30    


 


RDW  13.6 % (11.9-15.9)  D 10/05/20  07:30    


 


Plt Count  170 K/MM3 (134-434)   10/05/20  07:30    


 


MPV  10.6 fl (7.5-11.1)   10/05/20  07:30    


 


Sodium  140 mmol/L (136-145)   10/05/20  07:30    


 


Potassium  3.9 mmol/L (3.5-5.1)   10/05/20  07:30    


 


Chloride  107 mmol/L ()   10/05/20  07:30    


 


Carbon Dioxide  29 mmol/L (21-32)   10/05/20  07:30    


 


Anion Gap  4 MMOL/L (8-16)  L  10/05/20  07:30    


 


BUN  11.4 mg/dL (7-18)   10/05/20  07:30    


 


Creatinine  0.9 mg/dL (0.55-1.3)   10/05/20  07:30    


 


Est GFR (CKD-EPI)AfAm  112.62   10/05/20  07:30    


 


Est GFR (CKD-EPI)NonAf  97.17   10/05/20  07:30    


 


Random Glucose  89 mg/dL ()   10/05/20  07:30    


 


Calcium  8.4 mg/dL (8.5-10.1)  L  10/05/20  07:30    


 


Total Bilirubin  0.6 mg/dL (0.2-1)   10/05/20  07:30    


 


AST  30 U/L (15-37)   10/05/20  07:30    


 


ALT  30 U/L (13-61)   10/05/20  07:30    


 


Alkaline Phosphatase  129 U/L ()  H  10/05/20  07:30    


 


Total Protein  7.4 g/dl (6.4-8.2)   10/05/20  07:30    


 


Albumin  3.1 g/dl (3.4-5.0)  L  10/05/20  07:30    


 


Syphilis Serology  Non-reactive  (NONREACTIVE)   10/05/20  07:30    


 


COVID-19 ([facility])  Not detected  (Not Detected)   10/04/20  13:00    











Assessment: 





10/08/20 16:59


withdrawal symptom


Plan: 





continue detox librium regimen,discharge in am

## 2020-10-09 ENCOUNTER — HOSPITAL ENCOUNTER (INPATIENT)
Dept: HOSPITAL 74 - YASAS | Age: 55
LOS: 14 days | Discharge: HOME | DRG: 772 | End: 2020-10-23
Attending: ALLERGY & IMMUNOLOGY | Admitting: ALLERGY & IMMUNOLOGY
Payer: COMMERCIAL

## 2020-10-09 VITALS — TEMPERATURE: 98 F | HEART RATE: 75 BPM | DIASTOLIC BLOOD PRESSURE: 74 MMHG | SYSTOLIC BLOOD PRESSURE: 106 MMHG

## 2020-10-09 DIAGNOSIS — F17.210: ICD-10-CM

## 2020-10-09 DIAGNOSIS — Z21: ICD-10-CM

## 2020-10-09 DIAGNOSIS — J45.20: ICD-10-CM

## 2020-10-09 DIAGNOSIS — F10.20: Primary | ICD-10-CM

## 2020-10-09 PROCEDURE — HZ42ZZZ GROUP COUNSELING FOR SUBSTANCE ABUSE TREATMENT, COGNITIVE-BEHAVIORAL: ICD-10-PCS | Performed by: ALLERGY & IMMUNOLOGY

## 2020-10-09 RX ADMIN — Medication SCH TAB: at 10:04

## 2020-10-09 RX ADMIN — NICOTINE SCH: 7 PATCH TRANSDERMAL at 10:05

## 2020-10-09 RX ADMIN — Medication SCH MG: at 21:15

## 2020-10-09 RX ADMIN — BICTEGRAVIR SODIUM, EMTRICITABINE, AND TENOFOVIR ALAFENAMIDE FUMARATE SCH EACH: 50; 200; 25 TABLET ORAL at 10:04

## 2020-10-09 NOTE — XMS
Summarization Of Episode

                           Created on:2020



Patient:ODESSA DIAZ

Sex:Male

:1965

External Reference #:[id number]





Demographics







                          Address                   [address] [address]

 

                          Home Phone                [phone]

 

                          Email Address             N

 

                          Preferred Language        English

 

                          Marital Status            Not  or 

 

                          Oriental orthodox Affiliation     NO

 

                          Race                      BL

 

                          Ethnic Group              Not  or 









Author







                          Organization              [facility]









Support







                Name            Relationship    Address         Phone

 

                UE              Unavailable     Unavailable     Unavailable

 

                PARESH DIAZ          [id number] [phone]



                                                [place] [address] 

 

                PARESH DIAZ          [phone] Unavailable



                                                [place] [address] 









Re-disclosure Warning

The records that you are about to access may contain information from federally-
assisted alcohol or drug abuse programs. If such information is present, then 
the following federally mandated warning applies: This information has been 
disclosed to you from records protected by federal confidentiality rules (42 CFR
part 2). The federal rules prohibit you from making any further disclosure of 
this information unless further disclosure is expressly permitted by the written
consent of the person to whom it pertains or as otherwise permitted by 42 CFR 
part 2. A general authorization for the release of medical or other information 
is NOT sufficient for this purpose. The Federal rules restrict any use of the 
information to criminally investigate or prosecute any alcohol or drug abuse 
patient.The records that you are about to access may contain highly sensitive 
health information, the redisclosure of which is protected by Article 27-F of 
the [facility] Public Health law. If you continue you may haveaccess to 
information: Regarding HIV / AIDS; Provided by facilities licensed or operated 
by the [facility] Office of Mental Health; or Provided by the [facility]
Office for People With Developmental Disabilities. If such information is 
present, then the following [place] mandated warning applies: This 
information has been disclosed to you from confidential records which are 
protected by state law. State law prohibits you from making any further 
disclosure of this information without the specific written consent of the 
person to whom it pertains, or as otherwise permitted by law. Any unauthorized 
further disclosure in violation of state law may result in a fine or prison 
sentence or both. A general authorization for the release of medical or other 
information is NOT sufficient authorization for further disclosure.



Insurance Providers







          Payer name Policy type Policy ID Covered   Covered party's Policy    P

halle



                    / Coverage           party ID  relationship to Tyler    Inf

ormation



                    type                          tyler              

 

          MEDICAID            [id number]            SP                  [id number]







Results







                    ID                  Date                Data Source

 

                    [id number]         10/04/2020 01:00:00 PM EDT [facility]











          Name      Value     Range     Interpretation Description Data      Sup

porting



                                        Code                Source(s) Document(s

)

 

          SARS                                              [facility]   



          coronavirus 2                                                   



          RNA                                                         









                                        This lab was ordered by [facility]

ct Bill Inter and reported by [facility].











                    ID                  Date                Data Source

 

                    [id number]         2020 12:00:00 PM EDT [facility]











          Name      Value     Range     Interpretation Description Data      Sup

porting



                                        Code                Source(s) Document(s

)

 

          SARS                                              [facility]   



          coronavirus 2                                                   



          RNA                                                         









                                        This lab was ordered by [facility]

ct Bill Inter and reported by [facility].









                                        Procedure

## 2020-10-09 NOTE — XMS
Summarization Of Episode

                           Created on:2020



Patient:ODESSA DIAZ

Sex:Male

:1965

External Reference #:[id number]





Demographics







                          Address                   [address] [address]

 

                          Home Phone                [phone]

 

                          Email Address             N

 

                          Preferred Language        English

 

                          Marital Status            Not  or 

 

                          Hindu Affiliation     NO

 

                          Race                      BL

 

                          Ethnic Group              Not  or 









Author







                          Organization              [facility]









Support







                Name            Relationship    Address         Phone

 

                UE              Unavailable     Unavailable     Unavailable

 

                PARESH DIAZ          [id number] [phone]



                                                [place] [address] 

 

                PARESH DIAZ          [phone] Unavailable



                                                [place] [address] 









Re-disclosure Warning

The records that you are about to access may contain information from federally-
assisted alcohol or drug abuse programs. If such information is present, then 
the following federally mandated warning applies: This information has been 
disclosed to you from records protected by federal confidentiality rules (42 CFR
part 2). The federal rules prohibit you from making any further disclosure of 
this information unless further disclosure is expressly permitted by the written
consent of the person to whom it pertains or as otherwise permitted by 42 CFR 
part 2. A general authorization for the release of medical or other information 
is NOT sufficient for this purpose. The Federal rules restrict any use of the 
information to criminally investigate or prosecute any alcohol or drug abuse 
patient.The records that you are about to access may contain highly sensitive 
health information, the redisclosure of which is protected by Article 27-F of 
the [facility] Public Health law. If you continue you may haveaccess to 
information: Regarding HIV / AIDS; Provided by facilities licensed or operated 
by the [facility] Office of Mental Health; or Provided by the [facility]
Office for People With Developmental Disabilities. If such information is 
present, then the following [place] mandated warning applies: This 
information has been disclosed to you from confidential records which are 
protected by state law. State law prohibits you from making any further 
disclosure of this information without the specific written consent of the 
person to whom it pertains, or as otherwise permitted by law. Any unauthorized 
further disclosure in violation of state law may result in a fine or [facility] 
sentence or both. A general authorization for the release of medical or other 
information is NOT sufficient authorization for further disclosure.



Insurance Providers







          Payer name Policy type Policy ID Covered   Covered party's Policy    P

halle



                    / Coverage           party ID  relationship to Tyler    Inf

ormation



                    type                          tyler              

 

          MEDICAID            [id number]            SP                  [id number]







Results







                    ID                  Date                Data Source

 

                    [id number]         10/04/2020 01:00:00 PM EDT [facility]











          Name      Value     Range     Interpretation Description Data      Sup

porting



                                        Code                Source(s) Document(s

)

 

          SARS                                              [facility]   



          coronavirus 2                                                   



          RNA                                                         









                                        This lab was ordered by [facility]

ct Bill Inter and reported by [facility].











                    ID                  Date                Data Source

 

                    [id number]         2020 12:00:00 PM EDT [facility]











          Name      Value     Range     Interpretation Description Data      Sup

porting



                                        Code                Source(s) Document(s

)

 

          SARS                                              [facility]   



          coronavirus 2                                                   



          RNA                                                         









                                        This lab was ordered by [facility]

ct Bill Inter and reported by [facility].









                                        Procedure

## 2020-10-09 NOTE — HP
BHS MD Rehab Assess/Revision





- Admission History


Admitted to Rehab from: [facility] [place]





- Vital signs


Vital Signs: 


                                   Vital Signs











 Period  Temp  Pulse  Resp  BP Sys/Hyde  Pulse Ox


 


 Last 24 Hr  97.1 F  70  18  106/70  














- Findings


Detox History & Physical reviewed: Yes


Concur with findings: Yes





Inpatient Rehab Admission





- Rehab Decision to Admit


Inpatient rehab admission?: Yes





- Initial Determination


Are CD services needed?: Yes


Free of communicable disease: Yes


Not in need of hospitalization: Yes





- Rehab Admission Criteria


Previous failed treatment: Yes


Poor recovery environment: Yes


Comorbidities: Yes


Lacks judgement: No


Patient is meeting Inpatient Rehab admission criteria:: Yes

## 2020-10-09 NOTE — DS
[facility] Detox Discharge Summary


Admission Date: 


10/04/20





Discharge Date: 10/09/20





- History


Present History: Alcohol Dependence





- Physical Exam Results


Vital Signs: 


                                   Vital Signs











Temperature  98.0 F   10/09/20 09:03


 


Pulse Rate  75   10/09/20 09:03


 


Respiratory Rate  18   10/09/20 09:03


 


Blood Pressure  106/74   10/09/20 09:03


 


O2 Sat by Pulse Oximetry (%)  97   10/09/20 06:45











Pertinent Admission Physical Exam Findings: 





PE


Gnl: WDWN, in NAD


MS: awake, alert, nl language function


Motor: moves limbs well


Coord: nl


Gait: steady





                                Laboratory Tests











  10/04/20 10/05/20 10/05/20





  13:00 07:30 07:30


 


WBC    3.1 L


 


RBC    4.61


 


Hgb    13.2


 


Hct    39.9


 


MCV    86.6


 


MCH    28.6


 


MCHC    33.1


 


RDW    13.6  D


 


Plt Count    170


 


MPV    10.6


 


Sodium   


 


Potassium   


 


Chloride   


 


Carbon Dioxide   


 


Anion Gap   


 


BUN   


 


Creatinine   


 


Est GFR (CKD-EPI)AfAm   


 


Est GFR (CKD-EPI)NonAf   


 


Random Glucose   


 


Calcium   


 


Total Bilirubin   


 


AST   


 


ALT   


 


Alkaline Phosphatase   


 


Total Protein   


 


Albumin   


 


Syphilis Serology   Non-reactive 


 


COVID-19 (IRMA)  Not detected  














  10/05/20





  07:30


 


WBC 


 


RBC 


 


Hgb 


 


Hct 


 


MCV 


 


MCH 


 


MCHC 


 


RDW 


 


Plt Count 


 


MPV 


 


Sodium  140


 


Potassium  3.9


 


Chloride  107


 


Carbon Dioxide  29


 


Anion Gap  4 L


 


BUN  11.4


 


Creatinine  0.9


 


Est GFR (CKD-EPI)AfAm  112.62


 


Est GFR (CKD-EPI)NonAf  97.17


 


Random Glucose  89


 


Calcium  8.4 L


 


Total Bilirubin  0.6


 


AST  30


 


ALT  30


 


Alkaline Phosphatase  129 H


 


Total Protein  7.4


 


Albumin  3.1 L


 


Syphilis Serology 


 


COVID-19 (IRMA) 








                              Home Medication List











 Medication  Instructions  Recorded  Confirmed  Type


 


Hydrocortisone 0.5% Cream [Hytone 1 applic TP BID PRN 08/12/20 10/04/20 History





0.5% Cream -]    








                               Active Medications











Generic Name Dose Route Start Last Admin





  Trade Name Freq  PRN Reason Stop Dose Admin


 


Acetaminophen  650 mg  10/04/20 13:17 





  Tylenol -  PO  





  Q6H PRN  





  PAIN LEVEL 4 - 6  


 


Acetaminophen  650 mg  10/04/20 13:17 





  Tylenol -  PO  





  Q6H PRN  





  FEVER  


 


Al Hydroxide/Mg Hydroxide  30 ml  10/04/20 13:17 





  Mylanta Oral Suspension -  PO  





  Q6H PRN  





  DYSPEPSIA  


 


Albuterol Sulfate  2 puff  10/04/20 13:18 





  Ventolin Hfa Inhaler -  IH  





  Q4H PRN  





  ASTHMA  


 


Bictegravir/Emtricitabine/Tenofovir  1 each  10/05/20 10:00  10/08/20 10:36





  Biktarvy -25 Mg Tablet  PO   1 each





  DAILY@0800 NII   Administration


 


Bismuth Subsalicylate  524 mg  10/04/20 13:17 





  Pepto-Bismol -  PO  





  Q1H PRN  





  DIARRHEA  


 


Eucalyptus/Menthol/Phenol/Sorbitol  1 each  10/04/20 13:17 





  Cepastat Lozenge -  MM  10/10/20 13:17 





  Q4H PRN  





  SORE THROAT  


 


Hydroxyzine Pamoate  25 mg  10/04/20 13:17 





  Vistaril -  PO  10/10/20 13:17 





  Q4HWA PRN  





  ANXIETY  


 


Ibuprofen  400 mg  10/04/20 13:17 





  Motrin -  PO  





  Q6H PRN  





  PAIN LEVEL 1 - 3  


 


Magnesium Citrate  300 ml  10/04/20 13:17 





  Citroma -  PO  





  Q48H PRN  





  CONSTIPATION  


 


Magnesium Hydroxide  30 ml  10/04/20 13:17 





  Milk Of Magnesia -  PO  





  PRN PRN  





  CONSTIPATION  


 


Melatonin  5 mg  10/04/20 22:00  10/08/20 22:19





  Melatonin  PO   5 mg





  HS NII   Administration


 


Methocarbamol  500 mg  10/04/20 13:17 





  Robaxin -  PO  10/10/20 13:17 





  Q6H PRN  





  MUSCLE SPASMS  


 


Nicotine  7 mg  10/05/20 10:00  10/08/20 10:36





  Nicoderm Patch -  TD   Not Given





  DAILY NII  


 


Nicotine Polacrilex  2 mg  10/04/20 13:17 





  Nicorette Gum -  BUC  





  Q2H PRN  





  NICOTINE REPLACEMENT RX  


 


Ondansetron HCl  4 mg  10/04/20 13:17 





  Zofran Odt -  SL  





  Q8H PRN  





  Nausea/Vomiting  


 


Prenatal Multivit/Folic Acid/Iron  1 tab  10/05/20 10:00  10/08/20 10:36





  Prenatal Vitamins (Sjr) -  PO   1 tab





  DAILY NII   Administration


 


Thiamine HCl  100 mg  10/04/20 22:00  10/08/20 22:20





  Vitamin B1 -  PO   100 mg





  HS NII   Administration








                                   Vital Signs











Temperature  98.0 F   10/09/20 09:03


 


Pulse Rate  75   10/09/20 09:03


 


Respiratory Rate  18   10/09/20 09:03


 


Blood Pressure  106/74   10/09/20 09:03


 


O2 Sat by Pulse Oximetry (%)  97   10/09/20 06:45














- Treatment


Hospital Course: Detox Protocol Followed, Detoxed Safely, Responded well, 

Discharged Condition Good, Rehab Referral Accepted ([facility])





- Medication


Discharge Medications: 


Ambulatory Orders





Hydrocortisone 0.5% Cream [Hytone 0.5% Cream -] 1 applic TP BID PRN 08/12/20 


Albuterol Sulfate Inhaler - [Ventolin HFA Inhaler -] 2 inh PO Q4H PRN #1 inhaler

09/08/20 


Bictegrav/Emtricit/Tenofov Ala [Biktarvy -25 mg Tablet] 1 each PO DAILY 

#30 tablet 09/08/20 











- AMA


Did Patient Leave Against Medical Advice: No

## 2020-10-10 RX ADMIN — Medication SCH TAB: at 10:34

## 2020-10-10 RX ADMIN — BICTEGRAVIR SODIUM, EMTRICITABINE, AND TENOFOVIR ALAFENAMIDE FUMARATE SCH EACH: 50; 200; 25 TABLET ORAL at 10:34

## 2020-10-10 RX ADMIN — Medication SCH MG: at 21:35

## 2020-10-10 RX ADMIN — NICOTINE SCH: 7 PATCH TRANSDERMAL at 10:35

## 2020-10-11 RX ADMIN — BICTEGRAVIR SODIUM, EMTRICITABINE, AND TENOFOVIR ALAFENAMIDE FUMARATE SCH EACH: 50; 200; 25 TABLET ORAL at 10:00

## 2020-10-11 RX ADMIN — Medication SCH MG: at 21:13

## 2020-10-11 RX ADMIN — Medication SCH TAB: at 10:00

## 2020-10-11 RX ADMIN — NICOTINE SCH: 7 PATCH TRANSDERMAL at 10:00

## 2020-10-12 RX ADMIN — BICTEGRAVIR SODIUM, EMTRICITABINE, AND TENOFOVIR ALAFENAMIDE FUMARATE SCH EACH: 50; 200; 25 TABLET ORAL at 09:46

## 2020-10-12 RX ADMIN — Medication SCH MG: at 21:27

## 2020-10-12 RX ADMIN — Medication SCH TAB: at 09:46

## 2020-10-12 RX ADMIN — NICOTINE SCH: 7 PATCH TRANSDERMAL at 09:47

## 2020-10-12 RX ADMIN — Medication SCH MG: at 21:26

## 2020-10-13 RX ADMIN — Medication SCH MG: at 21:09

## 2020-10-13 RX ADMIN — Medication SCH TAB: at 10:26

## 2020-10-13 RX ADMIN — BICTEGRAVIR SODIUM, EMTRICITABINE, AND TENOFOVIR ALAFENAMIDE FUMARATE SCH EACH: 50; 200; 25 TABLET ORAL at 10:26

## 2020-10-13 RX ADMIN — NICOTINE SCH: 7 PATCH TRANSDERMAL at 10:26

## 2020-10-13 RX ADMIN — Medication SCH MG: at 21:08

## 2020-10-14 RX ADMIN — NICOTINE SCH: 7 PATCH TRANSDERMAL at 10:14

## 2020-10-14 RX ADMIN — BICTEGRAVIR SODIUM, EMTRICITABINE, AND TENOFOVIR ALAFENAMIDE FUMARATE SCH EACH: 50; 200; 25 TABLET ORAL at 10:14

## 2020-10-14 RX ADMIN — Medication SCH MG: at 21:28

## 2020-10-14 RX ADMIN — Medication SCH TAB: at 10:14

## 2020-10-15 RX ADMIN — BICTEGRAVIR SODIUM, EMTRICITABINE, AND TENOFOVIR ALAFENAMIDE FUMARATE SCH EACH: 50; 200; 25 TABLET ORAL at 10:34

## 2020-10-15 RX ADMIN — NICOTINE SCH: 7 PATCH TRANSDERMAL at 10:35

## 2020-10-15 RX ADMIN — Medication SCH MG: at 21:19

## 2020-10-15 RX ADMIN — Medication SCH TAB: at 10:34

## 2020-10-16 RX ADMIN — Medication SCH MG: at 21:20

## 2020-10-16 RX ADMIN — NICOTINE SCH: 7 PATCH TRANSDERMAL at 10:28

## 2020-10-16 RX ADMIN — Medication SCH MG: at 21:19

## 2020-10-16 RX ADMIN — BICTEGRAVIR SODIUM, EMTRICITABINE, AND TENOFOVIR ALAFENAMIDE FUMARATE SCH EACH: 50; 200; 25 TABLET ORAL at 10:28

## 2020-10-16 RX ADMIN — Medication SCH TAB: at 10:28

## 2020-10-17 RX ADMIN — BICTEGRAVIR SODIUM, EMTRICITABINE, AND TENOFOVIR ALAFENAMIDE FUMARATE SCH EACH: 50; 200; 25 TABLET ORAL at 09:16

## 2020-10-17 RX ADMIN — Medication SCH MG: at 21:10

## 2020-10-17 RX ADMIN — NICOTINE SCH: 7 PATCH TRANSDERMAL at 09:16

## 2020-10-17 RX ADMIN — Medication SCH TAB: at 09:16

## 2020-10-18 RX ADMIN — BICTEGRAVIR SODIUM, EMTRICITABINE, AND TENOFOVIR ALAFENAMIDE FUMARATE SCH EACH: 50; 200; 25 TABLET ORAL at 10:17

## 2020-10-18 RX ADMIN — Medication SCH MG: at 21:25

## 2020-10-18 RX ADMIN — NICOTINE SCH: 7 PATCH TRANSDERMAL at 10:17

## 2020-10-18 RX ADMIN — Medication SCH TAB: at 10:17

## 2020-10-19 RX ADMIN — NICOTINE SCH: 7 PATCH TRANSDERMAL at 10:04

## 2020-10-19 RX ADMIN — BICTEGRAVIR SODIUM, EMTRICITABINE, AND TENOFOVIR ALAFENAMIDE FUMARATE SCH EACH: 50; 200; 25 TABLET ORAL at 10:05

## 2020-10-19 RX ADMIN — Medication SCH MG: at 21:15

## 2020-10-19 RX ADMIN — TETRAHYDROZOLINE HCL PRN DROP: 0.05 SOLUTION/ DROPS OPHTHALMIC at 17:52

## 2020-10-19 RX ADMIN — Medication SCH TAB: at 10:04

## 2020-10-20 RX ADMIN — Medication SCH MG: at 21:40

## 2020-10-20 RX ADMIN — Medication SCH TAB: at 10:51

## 2020-10-20 RX ADMIN — BICTEGRAVIR SODIUM, EMTRICITABINE, AND TENOFOVIR ALAFENAMIDE FUMARATE SCH EACH: 50; 200; 25 TABLET ORAL at 10:51

## 2020-10-20 RX ADMIN — NICOTINE SCH: 7 PATCH TRANSDERMAL at 10:52

## 2020-10-20 RX ADMIN — TETRAHYDROZOLINE HCL PRN DROP: 0.05 SOLUTION/ DROPS OPHTHALMIC at 17:03

## 2020-10-21 RX ADMIN — Medication SCH TAB: at 10:21

## 2020-10-21 RX ADMIN — Medication SCH MG: at 21:08

## 2020-10-21 RX ADMIN — BICTEGRAVIR SODIUM, EMTRICITABINE, AND TENOFOVIR ALAFENAMIDE FUMARATE SCH EACH: 50; 200; 25 TABLET ORAL at 10:21

## 2020-10-21 RX ADMIN — NICOTINE SCH: 7 PATCH TRANSDERMAL at 10:21

## 2020-10-22 VITALS — TEMPERATURE: 97.1 F

## 2020-10-22 RX ADMIN — Medication SCH TAB: at 10:43

## 2020-10-22 RX ADMIN — NICOTINE SCH: 7 PATCH TRANSDERMAL at 10:43

## 2020-10-22 RX ADMIN — Medication SCH MG: at 21:46

## 2020-10-22 RX ADMIN — BICTEGRAVIR SODIUM, EMTRICITABINE, AND TENOFOVIR ALAFENAMIDE FUMARATE SCH EACH: 50; 200; 25 TABLET ORAL at 10:43

## 2020-10-22 NOTE — PN
[facility] Progress Note


Note: 





Pt is scheduled to discharge tomorrow after completing rehab treatment. Pt has 

been referred to CD aftercare by counselor Erna Whyte to [facility]. Pt reports he has primary care provider Rae Rogel in 

[place] byt has to change due to doctor not with Medicaid program and cannot 

fill Rx Biktarvy. Writer spoke to pharmacist at [facility] who 

confirmed restriction and suggested pt call his insurance to straighten up the 

issues filling his Rx with appropriate Medicaid provider. This information was 

relayed to the patient but pt became very angry and unable to understand the 

problem. 


                               Vital Signs - 24 hr











  10/21/20 10/22/20





  20:35 06:14


 


Temperature 97.3 F L 97.8 F


 


Pulse Rate 68 59 L


 


Respiratory 18 18





Rate  


 


Blood Pressure 122/78 115/73


 


O2 Sat by Pulse 97 97





Oximetry (%)  








General:Active Alert o x 3


Resp:nad,no resp dificulty


MSK: oob ambulating with steady gait; Active FROM, all limbs.





Medically stable








May d/c in a.m

## 2020-10-23 VITALS — DIASTOLIC BLOOD PRESSURE: 71 MMHG | HEART RATE: 58 BPM | SYSTOLIC BLOOD PRESSURE: 124 MMHG

## 2020-10-23 RX ADMIN — BICTEGRAVIR SODIUM, EMTRICITABINE, AND TENOFOVIR ALAFENAMIDE FUMARATE SCH EACH: 50; 200; 25 TABLET ORAL at 09:40

## 2020-10-23 RX ADMIN — Medication SCH TAB: at 09:39

## 2020-10-23 RX ADMIN — NICOTINE SCH: 7 PATCH TRANSDERMAL at 09:39

## 2020-10-23 NOTE — DS
[facility] Rehab Discharge Summary





- [facility] Rehab Discharge Summary


Admission Date: 10/09/20


Discharge Date: 10/23/20





- History


Present History: Alcohol dependence





- Discharge Physical Exam


Vital Signs: 


                                   Vital Signs











Temperature  97.1 F L  10/23/20 06:54


 


Pulse Rate  58 L  10/23/20 06:54


 


Respiratory Rate  18   10/23/20 06:54


 


Blood Pressure  124/71   10/23/20 06:54


 


O2 Sat by Pulse Oximetry (%)  97   10/23/20 06:54








ROS: DENIES SHAKES, SWEATS, HEADACHE AND RESTLESSNESS/ANXIETY





PE:





ALERT AND ORIENTED X 3


SKIN WARM AND DRY


IN NAD, GOOD MOOD, APPEARS WELL NOURISHED


EXT FULL ROM, AMB AD ERIC


NO TREMORS


DENIES SI/HI





A/P:





ALCOHOL DEPENDENCE


HIV+


ASTHMA








PATIENT IS MEDICALLY STABLE FOR D/C


AFTERCARE ARRANGED FOR [facility], APPT 10/30/2020 3PM

















- Treatment


Discharge Condition: Discharge condition good, Rehabilitated safely, Responded 

well, Outpatient referral accepted


Hospital Course: 





PATIENT DISCHARGED FROM REHAB TODAY FOR ALCOHOL DEPENDENCE. HE IS MEDICALLY 

STABLE AND DENIES SI/HI. DURING COURSE OF TREATMENT, PATIENT ATTENDED GROUP 

MEETINGS AND 1:1 SESSIONS WITH COUNSELING TEAM. AFTERCARE ARRANGED FOR [facility], APPOINTMENT SCHEDULED FOR 10/30/2020 3PM. PATIENT MEDICALLY 

ADVISED TO FOLLOW UP WITH OTP AS SCHEDULED AND TO F/U WITH PCP AS RECOMMENDED TO

CONTINUE MEDICAL MANAGEMENT OF CHRONIC CONDITIONS. PATIENT D/C FROM REHAB IN 

STABLE CONDITION. 


Ambulatory Orders





Hydrocortisone 0.5% Cream [Hytone 0.5% Cream -] 1 applic TP BID PRN 08/12/20 


Albuterol Sulfate Inhaler - [Ventolin HFA Inhaler -] 2 inh PO Q4H PRN #1 inhaler

09/08/20 


Bictegrav/Emtricit/Tenofov Ala [Biktarvy -25 mg Tablet] 1 each PO DAILY 

#30 tablet 10/22/20 





 





- Medication


Discharge Medications: 


Ambulatory Orders





Hydrocortisone 0.5% Cream [Hytone 0.5% Cream -] 1 applic TP BID PRN 08/12/20 


Albuterol Sulfate Inhaler - [Ventolin HFA Inhaler -] 2 inh PO Q4H PRN #1 inhaler

09/08/20 


Bictegrav/Emtricit/Tenofov Ala [Biktarvy -25 mg Tablet] 1 each PO DAILY 

#30 tablet 10/22/20 











- Medication-Assisted Treatment (MAT)


Medication-Assisted Treatment (MAT): No





- Discharge Instructions


Diet, activity, other medical instructions: 





Diet: REG AS TOLERATED





Activity: AMB AD ERIC





Other medical instructions: F/U WITH PCP AS RECOMMENDED








- Follow-up Referral


Minutes to complete discharge: 35





- AMA


Did Patient Leave Against Medical Advice: No

## 2021-06-15 ENCOUNTER — HOSPITAL ENCOUNTER (INPATIENT)
Dept: HOSPITAL 74 - YASAS | Age: 56
LOS: 4 days | Discharge: HOME | End: 2021-06-19
Attending: ALLERGY & IMMUNOLOGY | Admitting: ALLERGY & IMMUNOLOGY
Payer: COMMERCIAL

## 2021-06-15 VITALS — BODY MASS INDEX: 20.7 KG/M2

## 2021-06-15 DIAGNOSIS — Z21: ICD-10-CM

## 2021-06-15 DIAGNOSIS — D72.819: ICD-10-CM

## 2021-06-15 DIAGNOSIS — J45.20: ICD-10-CM

## 2021-06-15 DIAGNOSIS — F14.20: ICD-10-CM

## 2021-06-15 DIAGNOSIS — Z85.048: ICD-10-CM

## 2021-06-15 DIAGNOSIS — K21.9: ICD-10-CM

## 2021-06-15 DIAGNOSIS — F10.230: Primary | ICD-10-CM

## 2021-06-15 DIAGNOSIS — F17.210: ICD-10-CM

## 2021-06-15 DIAGNOSIS — U07.1: ICD-10-CM

## 2021-06-15 DIAGNOSIS — F12.20: ICD-10-CM

## 2021-06-15 DIAGNOSIS — L30.9: ICD-10-CM

## 2021-06-15 PROCEDURE — U0005 INFEC AGEN DETEC AMPLI PROBE: HCPCS

## 2021-06-15 PROCEDURE — C9803 HOPD COVID-19 SPEC COLLECT: HCPCS

## 2021-06-15 PROCEDURE — U0003 INFECTIOUS AGENT DETECTION BY NUCLEIC ACID (DNA OR RNA); SEVERE ACUTE RESPIRATORY SYNDROME CORONAVIRUS 2 (SARS-COV-2) (CORONAVIRUS DISEASE [COVID-19]), AMPLIFIED PROBE TECHNIQUE, MAKING USE OF HIGH THROUGHPUT TECHNOLOGIES AS DESCRIBED BY CMS-2020-01-R: HCPCS

## 2021-06-16 LAB
ALBUMIN SERPL-MCNC: 3.7 G/DL (ref 3.4–5)
ALP SERPL-CCNC: 143 U/L (ref 45–117)
ALT SERPL-CCNC: 46 U/L (ref 13–61)
ANION GAP SERPL CALC-SCNC: 7 MMOL/L (ref 8–16)
AST SERPL-CCNC: 90 U/L (ref 15–37)
BILIRUB SERPL-MCNC: 1 MG/DL (ref 0.2–1)
BUN SERPL-MCNC: 8.3 MG/DL (ref 7–18)
CALCIUM SERPL-MCNC: 9.4 MG/DL (ref 8.5–10.1)
CHLORIDE SERPL-SCNC: 102 MMOL/L (ref 98–107)
CO2 SERPL-SCNC: 27 MMOL/L (ref 21–32)
CREAT SERPL-MCNC: 0.8 MG/DL (ref 0.55–1.3)
DEPRECATED RDW RBC AUTO: 13.6 % (ref 11.9–15.9)
GLUCOSE SERPL-MCNC: 99 MG/DL (ref 74–106)
HCT VFR BLD CALC: 46.8 % (ref 35.4–49)
HGB BLD-MCNC: 15.4 GM/DL (ref 11.7–16.9)
MCH RBC QN AUTO: 29 PG (ref 25.7–33.7)
MCHC RBC AUTO-ENTMCNC: 32.8 G/DL (ref 32–35.9)
MCV RBC: 88.2 FL (ref 80–96)
PLATELET # BLD AUTO: 167 10^3/UL (ref 134–434)
PMV BLD: 10.6 FL (ref 7.5–11.1)
PROT SERPL-MCNC: 9.2 G/DL (ref 6.4–8.2)
RBC # BLD AUTO: 5.3 M/MM3 (ref 4–5.6)
SODIUM SERPL-SCNC: 136 MMOL/L (ref 136–145)
WBC # BLD AUTO: 2.4 K/MM3 (ref 4–10)

## 2021-06-16 PROCEDURE — HZ2ZZZZ DETOXIFICATION SERVICES FOR SUBSTANCE ABUSE TREATMENT: ICD-10-PCS | Performed by: ALLERGY & IMMUNOLOGY

## 2021-06-16 RX ADMIN — Medication SCH MG: at 22:27

## 2021-06-16 RX ADMIN — Medication SCH TAB: at 11:04

## 2021-06-16 RX ADMIN — NICOTINE SCH MG: 21 PATCH TRANSDERMAL at 11:04

## 2021-06-16 RX ADMIN — METHOCARBAMOL PRN MG: 500 TABLET ORAL at 11:04

## 2021-06-17 RX ADMIN — METHOCARBAMOL PRN MG: 500 TABLET ORAL at 22:53

## 2021-06-17 RX ADMIN — Medication SCH TAB: at 10:57

## 2021-06-17 RX ADMIN — Medication SCH MG: at 22:53

## 2021-06-17 RX ADMIN — NICOTINE SCH MG: 21 PATCH TRANSDERMAL at 10:58

## 2021-06-17 RX ADMIN — METHOCARBAMOL PRN MG: 500 TABLET ORAL at 10:58

## 2021-06-18 RX ADMIN — Medication SCH MG: at 22:48

## 2021-06-18 RX ADMIN — METHOCARBAMOL PRN MG: 500 TABLET ORAL at 22:56

## 2021-06-18 RX ADMIN — Medication SCH MG: at 22:54

## 2021-06-18 RX ADMIN — Medication SCH TAB: at 10:52

## 2021-06-18 RX ADMIN — NICOTINE SCH MG: 21 PATCH TRANSDERMAL at 10:51

## 2021-06-19 VITALS — TEMPERATURE: 98.1 F | SYSTOLIC BLOOD PRESSURE: 108 MMHG | DIASTOLIC BLOOD PRESSURE: 72 MMHG | HEART RATE: 84 BPM

## 2021-06-19 RX ADMIN — NICOTINE SCH MG: 21 PATCH TRANSDERMAL at 11:15

## 2021-06-19 RX ADMIN — Medication SCH TAB: at 11:15

## 2021-08-04 ENCOUNTER — HOSPITAL ENCOUNTER (INPATIENT)
Dept: HOSPITAL 74 - YASAS | Age: 56
LOS: 29 days | Discharge: HOME | DRG: 772 | End: 2021-09-02
Attending: ALLERGY & IMMUNOLOGY | Admitting: ALLERGY & IMMUNOLOGY
Payer: COMMERCIAL

## 2021-08-04 VITALS — BODY MASS INDEX: 22.3 KG/M2

## 2021-08-04 DIAGNOSIS — J45.20: ICD-10-CM

## 2021-08-04 DIAGNOSIS — F17.210: ICD-10-CM

## 2021-08-04 DIAGNOSIS — Z59.0: ICD-10-CM

## 2021-08-04 DIAGNOSIS — F90.0: ICD-10-CM

## 2021-08-04 DIAGNOSIS — Z56.0: ICD-10-CM

## 2021-08-04 DIAGNOSIS — F19.280: ICD-10-CM

## 2021-08-04 DIAGNOSIS — F10.20: Primary | ICD-10-CM

## 2021-08-04 DIAGNOSIS — Z21: ICD-10-CM

## 2021-08-04 DIAGNOSIS — Z98.890: ICD-10-CM

## 2021-08-04 DIAGNOSIS — Z85.048: ICD-10-CM

## 2021-08-04 DIAGNOSIS — F14.20: ICD-10-CM

## 2021-08-04 DIAGNOSIS — M25.512: ICD-10-CM

## 2021-08-04 DIAGNOSIS — J30.2: ICD-10-CM

## 2021-08-04 DIAGNOSIS — F19.282: ICD-10-CM

## 2021-08-04 PROCEDURE — C9803 HOPD COVID-19 SPEC COLLECT: HCPCS

## 2021-08-04 PROCEDURE — HZ42ZZZ GROUP COUNSELING FOR SUBSTANCE ABUSE TREATMENT, COGNITIVE-BEHAVIORAL: ICD-10-PCS | Performed by: ALLERGY & IMMUNOLOGY

## 2021-08-04 PROCEDURE — U0003 INFECTIOUS AGENT DETECTION BY NUCLEIC ACID (DNA OR RNA); SEVERE ACUTE RESPIRATORY SYNDROME CORONAVIRUS 2 (SARS-COV-2) (CORONAVIRUS DISEASE [COVID-19]), AMPLIFIED PROBE TECHNIQUE, MAKING USE OF HIGH THROUGHPUT TECHNOLOGIES AS DESCRIBED BY CMS-2020-01-R: HCPCS

## 2021-08-04 PROCEDURE — U0005 INFEC AGEN DETEC AMPLI PROBE: HCPCS

## 2021-08-04 RX ADMIN — Medication SCH MG: at 21:41

## 2021-08-04 SDOH — ECONOMIC STABILITY - HOUSING INSECURITY: HOMELESSNESS: Z59.0

## 2021-08-04 SDOH — ECONOMIC STABILITY - INCOME SECURITY: UNEMPLOYMENT, UNSPECIFIED: Z56.0

## 2021-08-05 LAB
ALBUMIN SERPL-MCNC: 3.4 G/DL (ref 3.4–5)
ALP SERPL-CCNC: 101 U/L (ref 45–117)
ALT SERPL-CCNC: 30 U/L (ref 13–61)
ANION GAP SERPL CALC-SCNC: 7 MMOL/L (ref 8–16)
APPEARANCE UR: CLEAR
AST SERPL-CCNC: 28 U/L (ref 15–37)
BILIRUB SERPL-MCNC: 0.5 MG/DL (ref 0.2–1)
BILIRUB UR STRIP.AUTO-MCNC: NEGATIVE MG/DL
BUN SERPL-MCNC: 17.6 MG/DL (ref 7–18)
CALCIUM SERPL-MCNC: 9 MG/DL (ref 8.5–10.1)
CHLORIDE SERPL-SCNC: 106 MMOL/L (ref 98–107)
CO2 SERPL-SCNC: 27 MMOL/L (ref 21–32)
COLOR UR: YELLOW
CREAT SERPL-MCNC: 0.9 MG/DL (ref 0.55–1.3)
DEPRECATED RDW RBC AUTO: 14.2 % (ref 11.9–15.9)
GLUCOSE SERPL-MCNC: 79 MG/DL (ref 74–106)
HCT VFR BLD CALC: 39.3 % (ref 35.4–49)
HGB BLD-MCNC: 13.4 GM/DL (ref 11.7–16.9)
KETONES UR QL STRIP: NEGATIVE
LEUKOCYTE ESTERASE UR QL STRIP.AUTO: NEGATIVE
MCH RBC QN AUTO: 29.9 PG (ref 25.7–33.7)
MCHC RBC AUTO-ENTMCNC: 34 G/DL (ref 32–35.9)
MCV RBC: 88.1 FL (ref 80–96)
NITRITE UR QL STRIP: NEGATIVE
PH UR: 5.5 [PH] (ref 5–8)
PLATELET # BLD AUTO: 223 10^3/UL (ref 134–434)
PMV BLD: 10.6 FL (ref 7.5–11.1)
PROT SERPL-MCNC: 7.7 G/DL (ref 6.4–8.2)
PROT UR QL STRIP: NEGATIVE
PROT UR QL STRIP: NEGATIVE
RBC # BLD AUTO: 4.46 M/MM3 (ref 4–5.6)
SODIUM SERPL-SCNC: 140 MMOL/L (ref 136–145)
SP GR UR: 1.02 (ref 1.01–1.03)
UROBILINOGEN UR STRIP-MCNC: 0.2 MG/DL (ref 0.2–1)
WBC # BLD AUTO: 2.6 K/MM3 (ref 4–10)

## 2021-08-05 RX ADMIN — HYDROXYZINE PAMOATE PRN MG: 25 CAPSULE ORAL at 10:26

## 2021-08-05 RX ADMIN — NICOTINE SCH MG: 4 INHALANT RESPIRATORY (INHALATION) at 11:51

## 2021-08-05 RX ADMIN — Medication SCH MG: at 21:00

## 2021-08-05 RX ADMIN — HYDROXYZINE PAMOATE PRN MG: 25 CAPSULE ORAL at 21:00

## 2021-08-05 RX ADMIN — BICTEGRAVIR SODIUM, EMTRICITABINE, AND TENOFOVIR ALAFENAMIDE FUMARATE SCH EACH: 50; 200; 25 TABLET ORAL at 11:35

## 2021-08-05 RX ADMIN — Medication SCH TAB: at 10:25

## 2021-08-06 RX ADMIN — HYDROXYZINE PAMOATE PRN MG: 25 CAPSULE ORAL at 10:26

## 2021-08-06 RX ADMIN — Medication SCH MG: at 21:03

## 2021-08-06 RX ADMIN — Medication SCH TAB: at 10:25

## 2021-08-06 RX ADMIN — BICTEGRAVIR SODIUM, EMTRICITABINE, AND TENOFOVIR ALAFENAMIDE FUMARATE SCH EACH: 50; 200; 25 TABLET ORAL at 07:52

## 2021-08-06 RX ADMIN — NICOTINE SCH MG: 4 INHALANT RESPIRATORY (INHALATION) at 10:25

## 2021-08-06 RX ADMIN — HYDROXYZINE PAMOATE PRN MG: 25 CAPSULE ORAL at 18:22

## 2021-08-07 RX ADMIN — Medication SCH MG: at 21:10

## 2021-08-07 RX ADMIN — BICTEGRAVIR SODIUM, EMTRICITABINE, AND TENOFOVIR ALAFENAMIDE FUMARATE SCH EACH: 50; 200; 25 TABLET ORAL at 09:27

## 2021-08-07 RX ADMIN — NICOTINE SCH MG: 4 INHALANT RESPIRATORY (INHALATION) at 09:27

## 2021-08-07 RX ADMIN — HYDROXYZINE PAMOATE PRN MG: 25 CAPSULE ORAL at 21:11

## 2021-08-07 RX ADMIN — Medication SCH TAB: at 09:27

## 2021-08-07 RX ADMIN — HYDROXYZINE PAMOATE PRN MG: 25 CAPSULE ORAL at 01:32

## 2021-08-08 RX ADMIN — Medication SCH TAB: at 09:10

## 2021-08-08 RX ADMIN — BICTEGRAVIR SODIUM, EMTRICITABINE, AND TENOFOVIR ALAFENAMIDE FUMARATE SCH EACH: 50; 200; 25 TABLET ORAL at 09:10

## 2021-08-08 RX ADMIN — SUVOREXANT PRN MG: 10 TABLET, FILM COATED ORAL at 21:30

## 2021-08-08 RX ADMIN — NICOTINE SCH MG: 4 INHALANT RESPIRATORY (INHALATION) at 09:10

## 2021-08-08 RX ADMIN — Medication SCH MG: at 21:30

## 2021-08-09 RX ADMIN — NICOTINE SCH MG: 4 INHALANT RESPIRATORY (INHALATION) at 09:52

## 2021-08-09 RX ADMIN — HYDROXYZINE PAMOATE PRN MG: 25 CAPSULE ORAL at 21:25

## 2021-08-09 RX ADMIN — Medication SCH TAB: at 09:52

## 2021-08-09 RX ADMIN — BICTEGRAVIR SODIUM, EMTRICITABINE, AND TENOFOVIR ALAFENAMIDE FUMARATE SCH EACH: 50; 200; 25 TABLET ORAL at 08:52

## 2021-08-09 RX ADMIN — MAGNESIUM HYDROXIDE PRN ML: 400 SUSPENSION ORAL at 18:02

## 2021-08-09 RX ADMIN — Medication SCH MG: at 21:25

## 2021-08-09 RX ADMIN — SUVOREXANT PRN MG: 10 TABLET, FILM COATED ORAL at 21:24

## 2021-08-10 RX ADMIN — Medication SCH MG: at 21:33

## 2021-08-10 RX ADMIN — BICTEGRAVIR SODIUM, EMTRICITABINE, AND TENOFOVIR ALAFENAMIDE FUMARATE SCH EACH: 50; 200; 25 TABLET ORAL at 09:18

## 2021-08-10 RX ADMIN — Medication SCH TAB: at 10:17

## 2021-08-10 RX ADMIN — SUVOREXANT PRN MG: 10 TABLET, FILM COATED ORAL at 21:34

## 2021-08-10 RX ADMIN — NICOTINE SCH MG: 4 INHALANT RESPIRATORY (INHALATION) at 10:19

## 2021-08-11 RX ADMIN — Medication SCH TAB: at 10:07

## 2021-08-11 RX ADMIN — IBUPROFEN PRN MG: 400 TABLET, FILM COATED ORAL at 15:11

## 2021-08-11 RX ADMIN — NICOTINE SCH MG: 21 PATCH TRANSDERMAL at 10:07

## 2021-08-11 RX ADMIN — SUVOREXANT PRN MG: 10 TABLET, FILM COATED ORAL at 21:17

## 2021-08-11 RX ADMIN — Medication SCH MG: at 21:17

## 2021-08-11 RX ADMIN — BICTEGRAVIR SODIUM, EMTRICITABINE, AND TENOFOVIR ALAFENAMIDE FUMARATE SCH EACH: 50; 200; 25 TABLET ORAL at 10:07

## 2021-08-12 RX ADMIN — BICTEGRAVIR SODIUM, EMTRICITABINE, AND TENOFOVIR ALAFENAMIDE FUMARATE SCH EACH: 50; 200; 25 TABLET ORAL at 10:02

## 2021-08-12 RX ADMIN — MAGNESIUM HYDROXIDE PRN ML: 400 SUSPENSION ORAL at 11:48

## 2021-08-12 RX ADMIN — HYDROXYZINE PAMOATE PRN MG: 25 CAPSULE ORAL at 21:28

## 2021-08-12 RX ADMIN — Medication SCH TAB: at 10:02

## 2021-08-12 RX ADMIN — Medication SCH MG: at 21:27

## 2021-08-12 RX ADMIN — NICOTINE SCH MG: 21 PATCH TRANSDERMAL at 10:02

## 2021-08-13 RX ADMIN — MAGNESIUM HYDROXIDE PRN ML: 400 SUSPENSION ORAL at 12:38

## 2021-08-13 RX ADMIN — Medication SCH MG: at 21:19

## 2021-08-13 RX ADMIN — Medication SCH TAB: at 09:43

## 2021-08-13 RX ADMIN — NICOTINE SCH MG: 21 PATCH TRANSDERMAL at 09:43

## 2021-08-13 RX ADMIN — BICTEGRAVIR SODIUM, EMTRICITABINE, AND TENOFOVIR ALAFENAMIDE FUMARATE SCH EACH: 50; 200; 25 TABLET ORAL at 07:53

## 2021-08-13 RX ADMIN — SUVOREXANT PRN MG: 10 TABLET, FILM COATED ORAL at 21:19

## 2021-08-14 RX ADMIN — Medication SCH MG: at 21:34

## 2021-08-14 RX ADMIN — SUVOREXANT PRN MG: 10 TABLET, FILM COATED ORAL at 21:34

## 2021-08-14 RX ADMIN — NICOTINE SCH MG: 21 PATCH TRANSDERMAL at 10:13

## 2021-08-14 RX ADMIN — Medication SCH TAB: at 10:13

## 2021-08-14 RX ADMIN — BICTEGRAVIR SODIUM, EMTRICITABINE, AND TENOFOVIR ALAFENAMIDE FUMARATE SCH EACH: 50; 200; 25 TABLET ORAL at 07:02

## 2021-08-14 RX ADMIN — ALUMINUM HYDROXIDE, MAGNESIUM HYDROXIDE, AND SIMETHICONE PRN ML: 200; 200; 20 SUSPENSION ORAL at 15:58

## 2021-08-15 RX ADMIN — PSEUDOEPHEDRINE HCL AND TRIPROLIDINE HCL PRN COMBO: 60; 2.5 TABLET, FILM COATED ORAL at 21:10

## 2021-08-15 RX ADMIN — Medication SCH TAB: at 09:40

## 2021-08-15 RX ADMIN — NICOTINE SCH MG: 21 PATCH TRANSDERMAL at 09:40

## 2021-08-15 RX ADMIN — SUVOREXANT PRN MG: 10 TABLET, FILM COATED ORAL at 21:09

## 2021-08-15 RX ADMIN — Medication SCH MG: at 21:10

## 2021-08-15 RX ADMIN — BICTEGRAVIR SODIUM, EMTRICITABINE, AND TENOFOVIR ALAFENAMIDE FUMARATE SCH EACH: 50; 200; 25 TABLET ORAL at 07:36

## 2021-08-16 RX ADMIN — BICTEGRAVIR SODIUM, EMTRICITABINE, AND TENOFOVIR ALAFENAMIDE FUMARATE SCH EACH: 50; 200; 25 TABLET ORAL at 07:56

## 2021-08-16 RX ADMIN — POLYVINYL ALCOHOL SCH: 14 SOLUTION/ DROPS OPHTHALMIC at 21:28

## 2021-08-16 RX ADMIN — Medication SCH MG: at 21:27

## 2021-08-16 RX ADMIN — NICOTINE SCH MG: 21 PATCH TRANSDERMAL at 09:58

## 2021-08-16 RX ADMIN — POLYVINYL ALCOHOL SCH DROP: 14 SOLUTION/ DROPS OPHTHALMIC at 14:15

## 2021-08-16 RX ADMIN — SUVOREXANT PRN MG: 10 TABLET, FILM COATED ORAL at 21:27

## 2021-08-16 RX ADMIN — Medication SCH TAB: at 09:58

## 2021-08-17 RX ADMIN — Medication SCH TAB: at 09:37

## 2021-08-17 RX ADMIN — SUVOREXANT PRN MG: 10 TABLET, FILM COATED ORAL at 21:12

## 2021-08-17 RX ADMIN — MAGNESIUM HYDROXIDE PRN ML: 400 SUSPENSION ORAL at 16:33

## 2021-08-17 RX ADMIN — BICTEGRAVIR SODIUM, EMTRICITABINE, AND TENOFOVIR ALAFENAMIDE FUMARATE SCH EACH: 50; 200; 25 TABLET ORAL at 07:49

## 2021-08-17 RX ADMIN — POLYVINYL ALCOHOL SCH DROP: 14 SOLUTION/ DROPS OPHTHALMIC at 21:12

## 2021-08-17 RX ADMIN — PSEUDOEPHEDRINE HCL AND TRIPROLIDINE HCL PRN COMBO: 60; 2.5 TABLET, FILM COATED ORAL at 09:37

## 2021-08-17 RX ADMIN — POLYVINYL ALCOHOL SCH: 14 SOLUTION/ DROPS OPHTHALMIC at 09:37

## 2021-08-17 RX ADMIN — NICOTINE SCH MG: 21 PATCH TRANSDERMAL at 09:37

## 2021-08-17 RX ADMIN — Medication SCH MG: at 21:11

## 2021-08-18 RX ADMIN — Medication SCH MG: at 21:34

## 2021-08-18 RX ADMIN — BICTEGRAVIR SODIUM, EMTRICITABINE, AND TENOFOVIR ALAFENAMIDE FUMARATE SCH EACH: 50; 200; 25 TABLET ORAL at 07:37

## 2021-08-18 RX ADMIN — POLYVINYL ALCOHOL SCH DROP: 14 SOLUTION/ DROPS OPHTHALMIC at 10:41

## 2021-08-18 RX ADMIN — Medication SCH TAB: at 10:40

## 2021-08-18 RX ADMIN — NICOTINE SCH MG: 21 PATCH TRANSDERMAL at 10:40

## 2021-08-18 RX ADMIN — SUVOREXANT PRN MG: 10 TABLET, FILM COATED ORAL at 21:34

## 2021-08-18 RX ADMIN — POLYVINYL ALCOHOL SCH DROP: 14 SOLUTION/ DROPS OPHTHALMIC at 21:34

## 2021-08-19 RX ADMIN — POLYVINYL ALCOHOL SCH DROP: 14 SOLUTION/ DROPS OPHTHALMIC at 10:17

## 2021-08-19 RX ADMIN — BICTEGRAVIR SODIUM, EMTRICITABINE, AND TENOFOVIR ALAFENAMIDE FUMARATE SCH EACH: 50; 200; 25 TABLET ORAL at 07:05

## 2021-08-19 RX ADMIN — Medication SCH MG: at 21:28

## 2021-08-19 RX ADMIN — NICOTINE SCH MG: 21 PATCH TRANSDERMAL at 10:17

## 2021-08-19 RX ADMIN — SUVOREXANT PRN MG: 10 TABLET, FILM COATED ORAL at 21:28

## 2021-08-19 RX ADMIN — POLYVINYL ALCOHOL SCH DROP: 14 SOLUTION/ DROPS OPHTHALMIC at 22:23

## 2021-08-19 RX ADMIN — Medication SCH TAB: at 10:17

## 2021-08-20 RX ADMIN — NICOTINE SCH MG: 21 PATCH TRANSDERMAL at 09:55

## 2021-08-20 RX ADMIN — POLYVINYL ALCOHOL SCH DROP: 14 SOLUTION/ DROPS OPHTHALMIC at 21:38

## 2021-08-20 RX ADMIN — BICTEGRAVIR SODIUM, EMTRICITABINE, AND TENOFOVIR ALAFENAMIDE FUMARATE SCH EACH: 50; 200; 25 TABLET ORAL at 07:16

## 2021-08-20 RX ADMIN — SUVOREXANT PRN MG: 10 TABLET, FILM COATED ORAL at 21:38

## 2021-08-20 RX ADMIN — Medication SCH MG: at 21:38

## 2021-08-20 RX ADMIN — Medication SCH TAB: at 09:54

## 2021-08-20 RX ADMIN — POLYVINYL ALCOHOL SCH DROP: 14 SOLUTION/ DROPS OPHTHALMIC at 09:55

## 2021-08-21 RX ADMIN — Medication SCH MG: at 21:12

## 2021-08-21 RX ADMIN — POLYVINYL ALCOHOL SCH DROP: 14 SOLUTION/ DROPS OPHTHALMIC at 09:22

## 2021-08-21 RX ADMIN — NICOTINE SCH MG: 21 PATCH TRANSDERMAL at 09:22

## 2021-08-21 RX ADMIN — POLYVINYL ALCOHOL SCH DROP: 14 SOLUTION/ DROPS OPHTHALMIC at 21:12

## 2021-08-21 RX ADMIN — SUVOREXANT PRN MG: 10 TABLET, FILM COATED ORAL at 21:12

## 2021-08-21 RX ADMIN — MAGNESIUM HYDROXIDE PRN ML: 400 SUSPENSION ORAL at 15:36

## 2021-08-21 RX ADMIN — BICTEGRAVIR SODIUM, EMTRICITABINE, AND TENOFOVIR ALAFENAMIDE FUMARATE SCH EACH: 50; 200; 25 TABLET ORAL at 08:18

## 2021-08-21 RX ADMIN — Medication SCH TAB: at 09:22

## 2021-08-22 RX ADMIN — NICOTINE SCH MG: 21 PATCH TRANSDERMAL at 09:07

## 2021-08-22 RX ADMIN — Medication SCH MG: at 21:24

## 2021-08-22 RX ADMIN — BICTEGRAVIR SODIUM, EMTRICITABINE, AND TENOFOVIR ALAFENAMIDE FUMARATE SCH EACH: 50; 200; 25 TABLET ORAL at 07:03

## 2021-08-22 RX ADMIN — Medication SCH TAB: at 09:07

## 2021-08-22 RX ADMIN — POLYVINYL ALCOHOL SCH DROP: 14 SOLUTION/ DROPS OPHTHALMIC at 21:25

## 2021-08-22 RX ADMIN — SUVOREXANT PRN MG: 10 TABLET, FILM COATED ORAL at 21:24

## 2021-08-22 RX ADMIN — POLYVINYL ALCOHOL SCH DROP: 14 SOLUTION/ DROPS OPHTHALMIC at 09:07

## 2021-08-23 RX ADMIN — Medication SCH MG: at 21:11

## 2021-08-23 RX ADMIN — BICTEGRAVIR SODIUM, EMTRICITABINE, AND TENOFOVIR ALAFENAMIDE FUMARATE SCH EACH: 50; 200; 25 TABLET ORAL at 07:20

## 2021-08-23 RX ADMIN — Medication SCH TAB: at 09:29

## 2021-08-23 RX ADMIN — SUVOREXANT PRN MG: 10 TABLET, FILM COATED ORAL at 21:11

## 2021-08-23 RX ADMIN — POLYVINYL ALCOHOL SCH DROP: 14 SOLUTION/ DROPS OPHTHALMIC at 21:12

## 2021-08-23 RX ADMIN — POLYVINYL ALCOHOL SCH DROP: 14 SOLUTION/ DROPS OPHTHALMIC at 09:29

## 2021-08-23 RX ADMIN — NICOTINE SCH MG: 21 PATCH TRANSDERMAL at 09:29

## 2021-08-24 RX ADMIN — SUVOREXANT PRN MG: 10 TABLET, FILM COATED ORAL at 21:11

## 2021-08-24 RX ADMIN — NICOTINE SCH MG: 21 PATCH TRANSDERMAL at 10:32

## 2021-08-24 RX ADMIN — BICTEGRAVIR SODIUM, EMTRICITABINE, AND TENOFOVIR ALAFENAMIDE FUMARATE SCH EACH: 50; 200; 25 TABLET ORAL at 07:42

## 2021-08-24 RX ADMIN — Medication SCH TAB: at 10:32

## 2021-08-24 RX ADMIN — POLYVINYL ALCOHOL SCH DROP: 14 SOLUTION/ DROPS OPHTHALMIC at 21:12

## 2021-08-24 RX ADMIN — Medication SCH MG: at 21:11

## 2021-08-24 RX ADMIN — POLYVINYL ALCOHOL SCH DROP: 14 SOLUTION/ DROPS OPHTHALMIC at 10:33

## 2021-08-25 RX ADMIN — Medication SCH MG: at 21:51

## 2021-08-25 RX ADMIN — BICTEGRAVIR SODIUM, EMTRICITABINE, AND TENOFOVIR ALAFENAMIDE FUMARATE SCH EACH: 50; 200; 25 TABLET ORAL at 08:40

## 2021-08-25 RX ADMIN — POLYVINYL ALCOHOL SCH DROP: 14 SOLUTION/ DROPS OPHTHALMIC at 09:51

## 2021-08-25 RX ADMIN — POLYVINYL ALCOHOL SCH DROP: 14 SOLUTION/ DROPS OPHTHALMIC at 21:51

## 2021-08-25 RX ADMIN — SUVOREXANT PRN MG: 10 TABLET, FILM COATED ORAL at 21:51

## 2021-08-25 RX ADMIN — NICOTINE SCH MG: 21 PATCH TRANSDERMAL at 09:51

## 2021-08-25 RX ADMIN — IBUPROFEN PRN MG: 400 TABLET, FILM COATED ORAL at 21:51

## 2021-08-25 RX ADMIN — Medication SCH TAB: at 09:50

## 2021-08-26 RX ADMIN — IBUPROFEN PRN MG: 400 TABLET, FILM COATED ORAL at 21:11

## 2021-08-26 RX ADMIN — NICOTINE SCH MG: 21 PATCH TRANSDERMAL at 10:33

## 2021-08-26 RX ADMIN — BICTEGRAVIR SODIUM, EMTRICITABINE, AND TENOFOVIR ALAFENAMIDE FUMARATE SCH EACH: 50; 200; 25 TABLET ORAL at 08:10

## 2021-08-26 RX ADMIN — POLYVINYL ALCOHOL SCH DROP: 14 SOLUTION/ DROPS OPHTHALMIC at 21:10

## 2021-08-26 RX ADMIN — SUVOREXANT PRN MG: 10 TABLET, FILM COATED ORAL at 21:11

## 2021-08-26 RX ADMIN — Medication SCH TAB: at 10:33

## 2021-08-26 RX ADMIN — Medication SCH MG: at 21:10

## 2021-08-26 RX ADMIN — POLYVINYL ALCOHOL SCH DROP: 14 SOLUTION/ DROPS OPHTHALMIC at 10:35

## 2021-08-27 RX ADMIN — IBUPROFEN PRN MG: 400 TABLET, FILM COATED ORAL at 10:06

## 2021-08-27 RX ADMIN — BICTEGRAVIR SODIUM, EMTRICITABINE, AND TENOFOVIR ALAFENAMIDE FUMARATE SCH EACH: 50; 200; 25 TABLET ORAL at 08:04

## 2021-08-27 RX ADMIN — Medication SCH MG: at 21:30

## 2021-08-27 RX ADMIN — Medication SCH TAB: at 10:05

## 2021-08-27 RX ADMIN — POLYVINYL ALCOHOL SCH DROP: 14 SOLUTION/ DROPS OPHTHALMIC at 10:05

## 2021-08-27 RX ADMIN — SUVOREXANT PRN MG: 10 TABLET, FILM COATED ORAL at 21:31

## 2021-08-27 RX ADMIN — NICOTINE SCH MG: 21 PATCH TRANSDERMAL at 10:05

## 2021-08-27 RX ADMIN — HYDROXYZINE PAMOATE PRN MG: 25 CAPSULE ORAL at 14:29

## 2021-08-27 RX ADMIN — POLYVINYL ALCOHOL SCH DROP: 14 SOLUTION/ DROPS OPHTHALMIC at 21:31

## 2021-08-28 RX ADMIN — Medication SCH TAB: at 10:23

## 2021-08-28 RX ADMIN — POLYVINYL ALCOHOL SCH DROP: 14 SOLUTION/ DROPS OPHTHALMIC at 10:24

## 2021-08-28 RX ADMIN — HYDROXYZINE PAMOATE PRN MG: 25 CAPSULE ORAL at 14:10

## 2021-08-28 RX ADMIN — Medication SCH MG: at 21:15

## 2021-08-28 RX ADMIN — BICTEGRAVIR SODIUM, EMTRICITABINE, AND TENOFOVIR ALAFENAMIDE FUMARATE SCH EACH: 50; 200; 25 TABLET ORAL at 07:20

## 2021-08-28 RX ADMIN — NICOTINE SCH MG: 21 PATCH TRANSDERMAL at 10:23

## 2021-08-28 RX ADMIN — POLYVINYL ALCOHOL SCH: 14 SOLUTION/ DROPS OPHTHALMIC at 21:17

## 2021-08-28 RX ADMIN — IBUPROFEN PRN MG: 400 TABLET, FILM COATED ORAL at 21:16

## 2021-08-28 RX ADMIN — SUVOREXANT PRN MG: 10 TABLET, FILM COATED ORAL at 21:15

## 2021-08-29 RX ADMIN — Medication SCH TAB: at 10:09

## 2021-08-29 RX ADMIN — HYDROXYZINE PAMOATE PRN MG: 25 CAPSULE ORAL at 10:09

## 2021-08-29 RX ADMIN — HYDROXYZINE PAMOATE PRN MG: 25 CAPSULE ORAL at 22:57

## 2021-08-29 RX ADMIN — NICOTINE SCH MG: 21 PATCH TRANSDERMAL at 10:09

## 2021-08-29 RX ADMIN — BICTEGRAVIR SODIUM, EMTRICITABINE, AND TENOFOVIR ALAFENAMIDE FUMARATE SCH EACH: 50; 200; 25 TABLET ORAL at 10:10

## 2021-08-29 RX ADMIN — POLYVINYL ALCOHOL SCH DROP: 14 SOLUTION/ DROPS OPHTHALMIC at 10:10

## 2021-08-29 RX ADMIN — SUVOREXANT PRN MG: 10 TABLET, FILM COATED ORAL at 21:31

## 2021-08-29 RX ADMIN — POLYVINYL ALCOHOL SCH DROP: 14 SOLUTION/ DROPS OPHTHALMIC at 21:31

## 2021-08-29 RX ADMIN — Medication SCH MG: at 21:31

## 2021-08-30 RX ADMIN — POLYVINYL ALCOHOL SCH: 14 SOLUTION/ DROPS OPHTHALMIC at 21:04

## 2021-08-30 RX ADMIN — SUVOREXANT PRN MG: 10 TABLET, FILM COATED ORAL at 21:04

## 2021-08-30 RX ADMIN — Medication SCH TAB: at 10:05

## 2021-08-30 RX ADMIN — Medication SCH MG: at 21:03

## 2021-08-30 RX ADMIN — ALUMINUM HYDROXIDE, MAGNESIUM HYDROXIDE, AND SIMETHICONE PRN ML: 200; 200; 20 SUSPENSION ORAL at 19:58

## 2021-08-30 RX ADMIN — POLYVINYL ALCOHOL SCH DROP: 14 SOLUTION/ DROPS OPHTHALMIC at 10:05

## 2021-08-30 RX ADMIN — NICOTINE SCH MG: 21 PATCH TRANSDERMAL at 10:05

## 2021-08-30 RX ADMIN — HYDROXYZINE PAMOATE PRN MG: 25 CAPSULE ORAL at 13:37

## 2021-08-30 RX ADMIN — BICTEGRAVIR SODIUM, EMTRICITABINE, AND TENOFOVIR ALAFENAMIDE FUMARATE SCH EACH: 50; 200; 25 TABLET ORAL at 10:05

## 2021-08-30 RX ADMIN — HYDROXYZINE PAMOATE PRN MG: 25 CAPSULE ORAL at 10:05

## 2021-08-31 RX ADMIN — NICOTINE SCH MG: 21 PATCH TRANSDERMAL at 09:37

## 2021-08-31 RX ADMIN — HYDROXYZINE PAMOATE PRN MG: 25 CAPSULE ORAL at 15:06

## 2021-08-31 RX ADMIN — Medication SCH MG: at 21:25

## 2021-08-31 RX ADMIN — POLYVINYL ALCOHOL SCH DROP: 14 SOLUTION/ DROPS OPHTHALMIC at 21:26

## 2021-08-31 RX ADMIN — BICTEGRAVIR SODIUM, EMTRICITABINE, AND TENOFOVIR ALAFENAMIDE FUMARATE SCH EACH: 50; 200; 25 TABLET ORAL at 08:37

## 2021-08-31 RX ADMIN — Medication SCH TAB: at 09:36

## 2021-08-31 RX ADMIN — POLYVINYL ALCOHOL SCH DROP: 14 SOLUTION/ DROPS OPHTHALMIC at 09:38

## 2021-08-31 RX ADMIN — SUVOREXANT PRN MG: 10 TABLET, FILM COATED ORAL at 21:26

## 2021-09-01 RX ADMIN — HYDROXYZINE PAMOATE PRN MG: 25 CAPSULE ORAL at 13:54

## 2021-09-01 RX ADMIN — Medication SCH TAB: at 09:56

## 2021-09-01 RX ADMIN — POLYVINYL ALCOHOL SCH DROP: 14 SOLUTION/ DROPS OPHTHALMIC at 21:11

## 2021-09-01 RX ADMIN — BICTEGRAVIR SODIUM, EMTRICITABINE, AND TENOFOVIR ALAFENAMIDE FUMARATE SCH EACH: 50; 200; 25 TABLET ORAL at 08:45

## 2021-09-01 RX ADMIN — NICOTINE SCH MG: 21 PATCH TRANSDERMAL at 09:55

## 2021-09-01 RX ADMIN — HYDROXYZINE PAMOATE PRN MG: 25 CAPSULE ORAL at 09:55

## 2021-09-01 RX ADMIN — POLYVINYL ALCOHOL SCH DROP: 14 SOLUTION/ DROPS OPHTHALMIC at 09:56

## 2021-09-01 RX ADMIN — SUVOREXANT PRN MG: 10 TABLET, FILM COATED ORAL at 21:10

## 2021-09-01 RX ADMIN — Medication SCH MG: at 21:10

## 2021-09-02 VITALS — SYSTOLIC BLOOD PRESSURE: 124 MMHG | HEART RATE: 66 BPM | TEMPERATURE: 96.8 F | DIASTOLIC BLOOD PRESSURE: 81 MMHG

## 2021-09-02 RX ADMIN — Medication SCH TAB: at 09:20

## 2021-09-02 RX ADMIN — POLYVINYL ALCOHOL SCH: 14 SOLUTION/ DROPS OPHTHALMIC at 09:20

## 2021-09-02 RX ADMIN — BICTEGRAVIR SODIUM, EMTRICITABINE, AND TENOFOVIR ALAFENAMIDE FUMARATE SCH EACH: 50; 200; 25 TABLET ORAL at 08:38

## 2021-09-02 RX ADMIN — NICOTINE SCH: 21 PATCH TRANSDERMAL at 09:20
